# Patient Record
Sex: MALE | Race: WHITE | Employment: OTHER | URBAN - METROPOLITAN AREA
[De-identification: names, ages, dates, MRNs, and addresses within clinical notes are randomized per-mention and may not be internally consistent; named-entity substitution may affect disease eponyms.]

---

## 2017-08-07 ENCOUNTER — TELEPHONE (OUTPATIENT)
Dept: SURGERY | Age: 67
End: 2017-08-07

## 2017-08-07 NOTE — TELEPHONE ENCOUNTER
Patient called to cancel his appointment, stating it was too close to his hip surgery. He might call back next year to reschedule.

## 2017-09-12 ENCOUNTER — HOSPITAL ENCOUNTER (OUTPATIENT)
Dept: PREADMISSION TESTING | Age: 67
Discharge: HOME | End: 2017-09-12
Payer: COMMERCIAL

## 2017-09-12 ENCOUNTER — HOSPITAL ENCOUNTER (OUTPATIENT)
Dept: PHYSICAL THERAPY | Age: 67
Discharge: HOME | End: 2017-09-12

## 2017-09-12 VITALS
RESPIRATION RATE: 18 BRPM | OXYGEN SATURATION: 97 % | DIASTOLIC BLOOD PRESSURE: 75 MMHG | BODY MASS INDEX: 31.17 KG/M2 | HEART RATE: 72 BPM | WEIGHT: 198.6 LBS | SYSTOLIC BLOOD PRESSURE: 146 MMHG | TEMPERATURE: 98.7 F | HEIGHT: 67 IN

## 2017-09-12 LAB
ABO + RH BLD: NORMAL
ALBUMIN SERPL-MCNC: 3.5 G/DL (ref 3.5–5)
ALBUMIN/GLOB SERPL: 1.1 {RATIO} (ref 1.1–2.2)
ALP SERPL-CCNC: 76 U/L (ref 45–117)
ALT SERPL-CCNC: 22 U/L (ref 12–78)
ANION GAP SERPL CALC-SCNC: 6 MMOL/L (ref 5–15)
APPEARANCE UR: CLEAR
AST SERPL-CCNC: 13 U/L (ref 15–37)
ATRIAL RATE: 62 BPM
BACTERIA URNS QL MICRO: NEGATIVE /HPF
BILIRUB SERPL-MCNC: 0.4 MG/DL (ref 0.2–1)
BILIRUB UR QL: NEGATIVE
BLOOD GROUP ANTIBODIES SERPL: NORMAL
BUN SERPL-MCNC: 12 MG/DL (ref 6–20)
BUN/CREAT SERPL: 18 (ref 12–20)
CALCIUM SERPL-MCNC: 8.5 MG/DL (ref 8.5–10.1)
CALCULATED P AXIS, ECG09: 35 DEGREES
CALCULATED R AXIS, ECG10: 46 DEGREES
CALCULATED T AXIS, ECG11: 52 DEGREES
CHLORIDE SERPL-SCNC: 89 MMOL/L (ref 97–108)
CO2 SERPL-SCNC: 32 MMOL/L (ref 21–32)
COLOR UR: NORMAL
CREAT SERPL-MCNC: 0.66 MG/DL (ref 0.7–1.3)
DIAGNOSIS, 93000: NORMAL
EPITH CASTS URNS QL MICRO: NORMAL /LPF
ERYTHROCYTE [DISTWIDTH] IN BLOOD BY AUTOMATED COUNT: 13.4 % (ref 11.5–14.5)
EST. AVERAGE GLUCOSE BLD GHB EST-MCNC: 126 MG/DL
GLOBULIN SER CALC-MCNC: 3.3 G/DL (ref 2–4)
GLUCOSE SERPL-MCNC: 89 MG/DL (ref 65–100)
GLUCOSE UR STRIP.AUTO-MCNC: NEGATIVE MG/DL
HBA1C MFR BLD: 6 % (ref 4.2–6.3)
HCT VFR BLD AUTO: 35 % (ref 36.6–50.3)
HGB BLD-MCNC: 12 G/DL (ref 12.1–17)
HGB UR QL STRIP: NEGATIVE
HYALINE CASTS URNS QL MICRO: NORMAL /LPF (ref 0–5)
INR PPP: 1 (ref 0.9–1.1)
KETONES UR QL STRIP.AUTO: NEGATIVE MG/DL
LEUKOCYTE ESTERASE UR QL STRIP.AUTO: NEGATIVE
MCH RBC QN AUTO: 32.3 PG (ref 26–34)
MCHC RBC AUTO-ENTMCNC: 34.3 G/DL (ref 30–36.5)
MCV RBC AUTO: 94.3 FL (ref 80–99)
NITRITE UR QL STRIP.AUTO: NEGATIVE
P-R INTERVAL, ECG05: 184 MS
PH UR STRIP: 7.5 [PH] (ref 5–8)
PLATELET # BLD AUTO: 278 K/UL (ref 150–400)
POTASSIUM SERPL-SCNC: 3.9 MMOL/L (ref 3.5–5.1)
PROT SERPL-MCNC: 6.8 G/DL (ref 6.4–8.2)
PROT UR STRIP-MCNC: NEGATIVE MG/DL
PROTHROMBIN TIME: 10.2 SEC (ref 9–11.1)
Q-T INTERVAL, ECG07: 408 MS
QRS DURATION, ECG06: 104 MS
QTC CALCULATION (BEZET), ECG08: 414 MS
RBC # BLD AUTO: 3.71 M/UL (ref 4.1–5.7)
RBC #/AREA URNS HPF: NORMAL /HPF (ref 0–5)
SODIUM SERPL-SCNC: 127 MMOL/L (ref 136–145)
SP GR UR REFRACTOMETRY: 1.01 (ref 1–1.03)
SPECIMEN EXP DATE BLD: NORMAL
UA: UC IF INDICATED,UAUC: NORMAL
UROBILINOGEN UR QL STRIP.AUTO: 0.2 EU/DL (ref 0.2–1)
VENTRICULAR RATE, ECG03: 62 BPM
WBC # BLD AUTO: 6.7 K/UL (ref 4.1–11.1)
WBC URNS QL MICRO: NORMAL /HPF (ref 0–4)

## 2017-09-12 PROCEDURE — 80053 COMPREHEN METABOLIC PANEL: CPT | Performed by: ORTHOPAEDIC SURGERY

## 2017-09-12 PROCEDURE — G8980 MOBILITY D/C STATUS: HCPCS

## 2017-09-12 PROCEDURE — G8979 MOBILITY GOAL STATUS: HCPCS

## 2017-09-12 PROCEDURE — 85027 COMPLETE CBC AUTOMATED: CPT | Performed by: ORTHOPAEDIC SURGERY

## 2017-09-12 PROCEDURE — 83036 HEMOGLOBIN GLYCOSYLATED A1C: CPT | Performed by: ORTHOPAEDIC SURGERY

## 2017-09-12 PROCEDURE — 81001 URINALYSIS AUTO W/SCOPE: CPT | Performed by: ORTHOPAEDIC SURGERY

## 2017-09-12 PROCEDURE — G8978 MOBILITY CURRENT STATUS: HCPCS

## 2017-09-12 PROCEDURE — 36415 COLL VENOUS BLD VENIPUNCTURE: CPT | Performed by: ORTHOPAEDIC SURGERY

## 2017-09-12 PROCEDURE — 97161 PT EVAL LOW COMPLEX 20 MIN: CPT

## 2017-09-12 PROCEDURE — 93005 ELECTROCARDIOGRAM TRACING: CPT

## 2017-09-12 PROCEDURE — 85610 PROTHROMBIN TIME: CPT | Performed by: ORTHOPAEDIC SURGERY

## 2017-09-12 PROCEDURE — 86900 BLOOD TYPING SEROLOGIC ABO: CPT | Performed by: ORTHOPAEDIC SURGERY

## 2017-09-12 PROCEDURE — 97110 THERAPEUTIC EXERCISES: CPT

## 2017-09-12 RX ORDER — TESTOSTERONE CYPIONATE 200 MG/ML
200 INJECTION INTRAMUSCULAR
Status: ON HOLD | COMMUNITY
End: 2017-09-21

## 2017-09-12 RX ORDER — CEFAZOLIN SODIUM IN 0.9 % NACL 2 G/100 ML
2 PLASTIC BAG, INJECTION (ML) INTRAVENOUS ONCE
Status: CANCELLED | OUTPATIENT
Start: 2017-09-21 | End: 2017-09-21

## 2017-09-12 RX ORDER — DICLOFENAC SODIUM 75 MG/1
75 TABLET, DELAYED RELEASE ORAL 2 TIMES DAILY
Status: ON HOLD | COMMUNITY
End: 2017-09-21

## 2017-09-12 RX ORDER — ACETAMINOPHEN 500 MG
1000 TABLET ORAL ONCE
Status: CANCELLED | OUTPATIENT
Start: 2017-09-21 | End: 2017-09-21

## 2017-09-12 RX ORDER — CELECOXIB 200 MG/1
400 CAPSULE ORAL ONCE
Status: CANCELLED | OUTPATIENT
Start: 2017-09-21 | End: 2017-09-21

## 2017-09-12 RX ORDER — OXYCODONE AND ACETAMINOPHEN 10; 325 MG/1; MG/1
1 TABLET ORAL
COMMUNITY
End: 2017-09-21

## 2017-09-12 RX ORDER — SODIUM CHLORIDE, SODIUM LACTATE, POTASSIUM CHLORIDE, CALCIUM CHLORIDE 600; 310; 30; 20 MG/100ML; MG/100ML; MG/100ML; MG/100ML
25 INJECTION, SOLUTION INTRAVENOUS CONTINUOUS
Status: CANCELLED | OUTPATIENT
Start: 2017-09-21

## 2017-09-12 RX ORDER — PREGABALIN 150 MG/1
150 CAPSULE ORAL ONCE
Status: CANCELLED | OUTPATIENT
Start: 2017-09-21 | End: 2017-09-21

## 2017-09-12 NOTE — PERIOP NOTES
[facility]  Preoperative Instructions        Surgery Date 09/21/17         Time of Arrival time of arrival to be called    1. On the day of your surgery, please report to the Surgical Services Registration Desk and sign in at your designated time. The Surgery Center is located to the right of the Emergency Room. 2. You must have someone with you to drive you home. You should not drive a car for 24 hours following surgery. Please make arrangements for a friend or family member to stay with you for the first 24 hours after your surgery. 3. Do not have anything to eat or drink (including water, gum, mints, coffee, juice) after midnight 09/20/17?? Ashvin Riddle ? This may not apply to medications prescribed by your physician. ?(Please note below the special instructions with medications to take the morning of your procedure.)    4. We recommend you do not drink any alcoholic beverages for 24 hours before and after your surgery. 5. Stop all Aspirin, non-steroidal anti-inflammatory drugs (i.e. Advil, Aleve), vitamins, and supplements?as directed by your surgeon's office. ? **If you are currently taking Plavix, Coumadin, or other blood-thinning agents, contact your surgeon for instructions. **    6. Wear comfortable clothes. Wear glasses instead of contacts. Do not bring any money or jewelry. Please bring picture ID, insurance card, and any prearranged co-payment or hospital payment. Do not wear make-up, particularly mascara the morning of your surgery. Do not wear nail polish, particularly if you are having foot /hand surgery. Wear your hair loose or down, no ponytails, buns, toney pins or clips. All body piercings must be removed. Please shower with antibacterial soap for three consecutive days before and on the morning of surgery, but do not apply any lotions, powders or deodorants after the shower on the day of surgery. Please use a fresh towels after each shower.  Please sleep in clean clothes and change bed linens the night before surgery. Please do not shave for 48 hours prior to surgery. Shaving of the face is acceptable. 7. You should understand that if you do not follow these instructions your surgery may be cancelled. If your physical condition changes (I.e. fever, cold or flu) please contact your surgeon as soon as possible. 8. It is important that you be on time. If a situation occurs where you may be late, please call [phone] (OR Holding Area). 9. If you have any questions and or problems, please call [phone] (Pre-admission Testing). 10. Your surgery time may be subject to change. You will receive a phone call the evening prior if your time changes. 11.  If having outpatient surgery, you must have someone to drive you here, stay with you during the duration of your stay, and to drive you home at time of discharge. 12.   In an effort to improve the efficiency, privacy, and safety for all of our Pre-op patients visitors are not allowed in the Holding area. Once you arrive and are registered your family/visitors will be asked to remain in the waiting room. The Pre-op staff will get you from the Surgical Waiting Area and will explain to you and your family/visitors that the Pre-op phase is beginning. The staff will answer any questions and provide instructions for tracking of the patient, by use of the existing tracking number and color-coded status board in the waiting room. At this time the staff will also ask for your designated spokesperson information in the event that the physician or staff need to provide an update or obtain any pertinent information. The designated spokesperson will be notified if the physician needs to speak to family during the pre-operative phase. If at any time your family/visitors has questions or concerns they may approach the volunteer desk in the waiting area for assistance.          Special Instructions:May have up to 8 ounces of water after midnight but stop 3 hours prior to your time of arrival to hospital.Bring inhalers morning of surgery and use. Stop diclofenac 1 week prior to surgery. MEDICATIONS TO TAKE THE MORNING OF SURGERY WITH A SIP OF WATER:allopurinol,buspar,celexa,prilosec,percocet if needed. I understand a pre-operative phone call will be made to verify my surgery time. In the event that I am not available, I give permission for a message to be left on my answering service and/or with another person? {yes [phone]         ___________________      __________   _________    (Signature of Patient)             (Witness)                (Date and Time)Preventing Infections Before  and After  Your Surgery  IMPORTANT INSTRUCTIONS    Please read and follow these instructions carefully. Every Night for Three (3) nights before your surgery:  1. Shower with an antibacterial soap, such as Dial, or the soap provided at your preassessment appointment. A shower is better than a bath for cleaning your skin. 2. If needed, ask someone to help you reach all areas of your body. Dont forget to clean your belly button with every shower. The night before your surgery:  1. On the night before your surgery, shower with an antibacterial soap, such as Dial, or the soap provided at your preassessment appointment. 2. With one packet of Hibiclens in hand, turn water off.  3. Apply Hibiclens antiseptic skin cleanser with a clean, freshly washed washcloth. ? Gently apply to your body from chin to toes (except the genital area) and especially the area(s) where your incision(s) will be. ? Leave Hibiclens on your skin for at least 20 seconds. CAUTION: If needed, Hibiclens may be used to clean the folds of skin of the legs (such as in the area of the groin) and on your buttocks and hips. However, do not use Hibiclens above the neck or in the genital area (your bottom) or put inside any area of your body.   4. Turn the water back on and rinse. 5. Dry gently with a clean, freshly washed towel. 6. After your shower, do not use any powder, deodorant, perfumes or lotion. 7. Use clean, freshly washed towels and washcloths every time you shower. 8. Wear clean, freshly washed pajamas to bed the night before surgery. 9. Sleep on clean, freshly washed sheets. 10. Do not allow pets to sleep in your bed with you. The Morning of your surgery:  1. Shower again thoroughly with an antibacterial soap, such as Dial or the soap provided at your preassessment appointment. If needed, ask someone for help to reach all areas of your body. Dont forget to clean your belly button! Rinse. 2. Dry gently with a clean, freshly washed towel. 3. After your shower, do not use any powder, deodorant, perfumes or lotion prior to surgery. 4. Put on clean, freshly washed clothing. Tips to help prevent infections after your surgery:  1. Protect your surgical wound from germs:  ? Hand washing is the most important thing you and your caregivers can do to prevent infections. ? Keep your bandage clean and dry! ? Do not touch your surgical wound. 2. Use clean, freshly washed towels and washcloths every time you shower; do not share bath linens with others. 3. Until your surgical wound is healed, wear clothing and sleep on bed linens each day that are clean and freshly washed. 4. Do not allow pets to sleep in your bed with you or touch your surgical wound. 5. Do not smoke  smoking delays wound healing. This may be a good time to stop smoking. 6. If you have diabetes, it is important for you to manage your blood sugar levels properly before your surgery as well as after your surgery. Poorly managed blood sugar levels slow down wound healing and prevent you from healing completely.

## 2017-09-12 NOTE — ADVANCED PRACTICE NURSE
PC to pt's PCP, Dr. Morena Waller regarding sodium of 127, chloride of 89. Left message on voice mail for instructions regarding pt's abnormal labs. Priya at Dr. Paty Vela' office notified of pt's abnormal labs and that [facility] would be made to PCP regarding further instructions. Will notify surgeon and pt regarding instructions when notified by PCP.

## 2017-09-12 NOTE — PROGRESS NOTES
[facility]  Physical Therapy Pre-surgery evaluation  [address] [facility], [address] [facility]    physical Therapy pre THR surgery EVALUATION  Patient: Kimi García (89 y.o. male)  Date: 9/12/2017  Primary Diagnosis: left total hip DJD        Precautions:        ASSESSMENT :  Based on the objective data described below, the patient presents with impaired gait, balance, pain, and overall high level functional mobility due to end stage degenerative joint disease in the left hip. Pt had his R hip replaced Feb 2016 and only had HHPT. Discussed anticipated disposition to home with possible discharge within a 1 to 2 day time frame post-surgery. Patient and  in agreement. Wife will be , currently not here. GOALS: (Goals have been discussed and agreed upon with patient.)  DISCHARGE GOALS: Time Frame: 1 DAY  1. Patient will demonstrate increased strength, range of motion, and pain control via a home exercise program in order to minimize functional deficits in preparation for their upcoming surgery. This will be achieved by using education, demonstration and through the use of an informational handout including a home exercise program.  REHABILITATION POTENTIAL FOR STATED GOALS: Good     RECOMMENDATIONS AND PLANNED INTERVENTIONS: (Benefits and precautions of physical therapy have been discussed with the patient.)  1. Home Exercise Program  TREATMENT PLAN EFFECTIVE DATES: 9/12/2017 TO 9/12/2017  FREQUENCY/DURATION: Patient to continue to perform home exercise program at least twice daily until his surgery. SUBJECTIVE:   Patient stated Mary Kay Caraballo did my other hip but then he retired.     OBJECTIVE DATA SUMMARY:   HISTORY:    Past Medical History:   Diagnosis Date    Chronic obstructive pulmonary disease ([address]     Chronic pain     back    GERD (gastroesophageal reflux disease)     Hypertension     Ill-defined condition     Gout    Psychiatric disorder     Generalized Anxiety Disorder  Psychiatric disorder     ETOH abuse     Past Surgical History:   Procedure Laterality Date    HX COLECTOMY  2000    diverticulitis    HX OTHER SURGICAL Left     mastoidectomy and inner ear surgery- age  18   [de-identified] TONSILLECTOMY      KNEE SCOPE,MED OR LAT MENIS REPAIR Left      Prior Level of Function/Home Situation: pt lives with wife and has all DME from last replacement. He is currently doing stem cell therapy for his COPD. Personal factors and/or comorbidities impacting plan of care:     Patient []   does  [x]   does not state signs/symptoms of shortness of breath/dyspnea on exertion/respiratory distress. Home Situation  Home Environment: Private residence  # Steps to Enter: 3  Rails to Enter: Yes  Hand Rails : Bilateral (wide)  One/Two Story Residence: Two story, live on 1st floor  Living Alone: No  Support Systems: Spouse/Significant Other/Partner  Patient Expects to be Discharged to[de-identified] Private residence  Current DME Used/Available at Home: Cane, straight, Walker, rolling, Adaptive dressing aides, Adaptive bathing aides, Grab bars  Tub or Shower Type: Tub/Shower combination    EXAMINATION/PRESENTATION/DECISION MAKING:     ADLs (Current Functional Status):    Bathing/Showering:   [x] Independent  [] Requires Assistance from Someone  [] Sponge Bath Only   Ambulation:  [x] Independent  [] Walk Indoors Only  [] Walk Outdoors  [] Use Assistive Device  [] Use Wheelchair Only     Dressing:  [x] Independent    Requires Assistance from Someone for:  [] Sock/Shoes  [] Pants  [] Everything   Household Activities:  [x] Routine house and yard work  [] Light Housework Only  [] None       Critical Behavior:   alert and oriented x 4             Strength:    Strength: Generally decreased, functional                    Tone & Sensation:   Tone: Normal              Sensation: Intact               Range Of Motion:  AROM: Generally decreased, functional           PROM: Within functional limits Coordination:  Coordination: Within functional limits    Functional Mobility:  Transfers:  Sit to Stand: Modified independent  Stand to Sit: Modified independent                       Balance:   Sitting: Intact  Standing: Intact  Ambulation/Gait Training:  Distance (ft): 50 Feet (ft)     Ambulation - Level of Assistance: Independent        Gait Abnormalities: Antalgic, Trunk sway increased             Therapeutic Exercises:   The patient was educated in, has demonstrated, and has received written instructions to complete for their home exercise program per total hip replacement protocol. Functional Measure:  Lower Extremity Functional Scale (LEFS):      Score 38/80     Percentage of impairment CH  0% CI  1-19% CJ  20-39% CK  40-59% CL  60-79% CM  80-99% CN  100%   LEFS score:  0-80 80 64-79 47-63 31-46 16-30 1-15 0     Cutt-offs: None established  TKA and LELO:   (Latasha et al, 2000)  MDC = 9 points   MCID = 9 points           G codes: In compliance with CMSs Claims Based Outcome Reporting, the following G-code set was chosen for this patient based on their primary functional limitation being treated: The outcome measure chosen to determine the severity of the functional limitation was the LEFS with a score of 38/80 which was correlated with the impairment scale. ? Mobility - Walking and Moving Around:     - CURRENT STATUS: CK - 40%-59% impaired, limited or restricted    - GOAL STATUS: CK - 40%-59% impaired, limited or restricted    - D/C STATUS:  CK - 40%-59% impaired, limited or restricted       Pain:                    Activity Tolerance:   WFL   Patient []   does  [x]   does not demonstrate signs/symptoms of shortness of breath/dyspnea on exertion/respiratory distress. COMMUNICATION/EDUCATION:   The patient was educated on:  [x]         Early post operative mobility is imperative to achieve a patient's desired outcomes and to restore biological function.   [x]         Post operative hip precautions may/may not be applicable. These precautions are based on the patient's physician and the procedure(s) performed. [x]         Anterior hip precautions including:  ·       No hip extension  ·       No external rotation  ·       No crossing of the legs/midline    [x]         Posterior hip precautions including:  ·       No hip flexion >90 degrees  ·       No internal rotation  ·       No crossing of the legs/midline    The patients plan of care was discussed as follows:   [x]         The patient verbalized understanding of his plan in preparation for their upcoming surgery  []         The patient's  was present for this session  []        The patient reports that he/she does not have a  identified at this time  []         The  verbalized understanding of the education regarding the patient's upcoming surgery  [x]         Patient/family agree to work toward stated goals and plan of care. []         Patient understands intent and goals of therapy, but is neutral about his/her participation. []         Patient is unable to participate in goal setting and plan of care.       Thank you for this referral.  Isaak Higuera, PT, DPT   Time Calculation: 17 mins

## 2017-09-12 NOTE — PERIOP NOTES
Faxed cbc,chem,coag's and urine results to 's office,fax confirmed. Any treatment for sodium and chloride levels?

## 2017-09-13 LAB
BACTERIA SPEC CULT: NORMAL
BACTERIA SPEC CULT: NORMAL
SERVICE CMNT-IMP: NORMAL

## 2017-09-13 RX ORDER — VALSARTAN 320 MG/1
320 TABLET ORAL DAILY
COMMUNITY
End: 2017-10-11 | Stop reason: DRUGHIGH

## 2017-09-13 NOTE — ADVANCED PRACTICE NURSE
PC from Nadeem Alexander, nurse for Dr. Bhargav Dumont regarding pt's low sodium. States Diovan HCT was discontinued by Dr. Bhargav Dumont and medication was changed to Valsartan. Pt's sodium to be rechecked in one week per Nadeem Alexander who states the pt was notified by Dr. José Antonio London staff of instructions. Dr. Lorin Alcantara' office notified.

## 2017-09-19 NOTE — PERIOP NOTES
Called 's office. Left message on voicemail for 's nurse who is covering for repeat lab results and clearance note. Rquested call back to pre op. Received call back from 's nurse. She will call patient to come into office for repeat labs.

## 2017-09-20 NOTE — H&P
Phoenix Singh MD - Adult Reconstruction and Total Joint Replacement     Orthopaedic History and Physical        NAME: Tameka Hollins       :  1950       MRN:  [id number]        Subjective:   Patient ID: Jef Cotter is a 61 y.o. male.     Chief Complaint: Follow-up of the Left Hip     He continues to note severe L hip pain that is limiting his ability to stay active. He is scheduled to have a L LELO with us in 3 weeks and presents today to be evaluated prior to surgery. His COPD is continuing to improve. No complaints of the contralateral side. His pulmonologist has cleared him for surgery    Patient Active Problem List    Diagnosis Date Noted    Degenerative joint disease of right hip 2016     Past Medical History:   Diagnosis Date    Arthritis     Chronic obstructive pulmonary disease ([address]     Chronic pain     back    GERD (gastroesophageal reflux disease)     Hypertension     Ill-defined condition     Gout    Psychiatric disorder     Generalized Anxiety Disorder    Psychiatric disorder     ETOH abuse      Past Surgical History:   Procedure Laterality Date    HX COLECTOMY  2000    diverticulitis    HX OTHER SURGICAL Left     mastoidectomy and inner ear surgery- age  18   [de-identified] OTHER SURGICAL      [facility]d    HX TONSILLECTOMY      KNEE SCOPE,MED OR LAT MENIS REPAIR Left       Prior to Admission medications    Medication Sig Start Date End Date Taking? Authorizing Provider   valsartan (DIOVAN) 320 mg tablet Take 320 mg by mouth daily. Historical Provider   oxyCODONE-acetaminophen (PERCOCET)  mg per tablet Take 1 Tab by mouth every eight (8) hours as needed for Pain. Historical Provider   diclofenac EC (VOLTAREN) 75 mg EC tablet Take 75 mg by mouth two (2) times a day. Historical Provider   testosterone cypionate (DEPOTESTOTERONE CYPIONATE) 200 mg/mL injection 200 mg by IntraMUSCular route every fourteen (14) days. Historical Provider   allopurinol (ZYLOPRIM) 300 mg tablet Take 300 mg by mouth daily. Historical Provider   omeprazole (PRILOSEC) 20 mg capsule Take 20 mg by mouth daily. Historical Provider   busPIRone (BUSPAR) 15 mg tablet Take 15 mg by mouth three (3) times daily. Historical Provider   citalopram (CELEXA) 40 mg tablet Take 40 mg by mouth daily. Historical Provider   budesonide-formoterol (SYMBICORT) 160-4.5 mcg/actuation HFA inhaler Take 2 Puffs by inhalation two (2) times a day. Historical Provider   tiotropium (SPIRIVA) 18 mcg inhalation capsule Take 1 Cap by inhalation daily. Historical Provider   albuterol (PROVENTIL HFA, VENTOLIN HFA, PROAIR HFA) 90 mcg/actuation inhaler Take 2 Puffs by inhalation daily. Historical Provider     Allergies   Allergen Reactions    Adhesive Tape-Silicones Other (comments)     Pulls skin off    Sulfa (Sulfonamide Antibiotics) Shortness of Breath      Social History   Substance Use Topics    Smoking status: Former Smoker     Years: 29.00     Quit date: 2/17/1993    Smokeless tobacco: Never Used    Alcohol use No      Comment: quit march 15 2017      Family History   Problem Relation Age of Onset    Cancer Mother      lung    Arthritis-osteo Mother     Cancer Father      throat    Cancer Brother      prostate        REVIEW OF SYSTEMS: A comprehensive review of systems was negative except for that written in the HPI. Objective:   Constitutional: He appears stated age. Pt is cooperative and is in no acute distress. Well nourished. Well developed. Body habitus is obese. Body mass index is 30.57 kg/(m^2). Gait is antalgic. Assistive devices: none  Eyes:  Sclera are nonicteric. Respiratory:  No labored breathing. Cardiovascular:  No marked edema. Well perfused extremities bilaterally. Skin:  No marked skin ulcers. No lymphedema or skin abnormalities. Neurological:  No marked sensory loss noted. Grossly neurovascularly intact.  Both lower extremities are intact to distal sensory and motor function. Psychiatric: Alert and oriented x3.     MUSCULOSKELETAL: Lumbar spine is nonfocal. No paraspinal tenderness. Painless trunk ROM. Painful L hip ROM. 5/5 LLE strength        Radiographs:        X-ray Pelvis 1 Or 2 Views     Result Date: 8/31/2017  Shielding: N/A. Supine. AP. Notes  Indication(s): L hip pain. Impression: He has severe degenerative changes and joint space narrowing in the L hip. Aspherical femoral head and acetabulum with a large CAM.  Films show the pt's R LELO prosthesis in good clinical alignment with no evidence of loosening or wear.          Assessment:      1. Primary osteoarthritis of left hip    2. Status post total replacement of right hip    3. Non morbid obesity, unspecified obesity type            Plan: At this time, I recommended we continue as plan with the L LELO. Conservative treatments including activity modification, medication, and steroid injections have not successfully relieved pain. Surgery was discussed at length with the pt today. We went over all the pertinent risks, benefits, and alternatives to the procedure. They understand no guarantees can be made about the outcome and they would like to proceed. I discussed the pre-op total joint class and general recovery timeline with the pt. The pt understands the need for medical clearance.  We will plan for the LELO in 3 weeks.             Scribed for Vicente Arzate PA-C by KIRK Hazel

## 2017-09-20 NOTE — ADVANCED PRACTICE NURSE
09/20/17 1000 PC to Dr. Alysa Preston office to obtain repeat labs and clearance for pt's surgery tomorrow. Office to fax results to [phone].

## 2017-09-20 NOTE — PERIOP NOTES
Spoke with Carlos at Dr. Cosme Calderon' office about patient still needing to see Dr. Reji Sniderr for repeat labs. Carlos to call Dr. Jeff Khan office to get clearance note and notify Dr. Cosme Calderon.

## 2017-09-21 ENCOUNTER — HOSPITAL ENCOUNTER (INPATIENT)
Age: 67
LOS: 1 days | Discharge: HOME HEALTH | DRG: 470 | End: 2017-09-21
Attending: ORTHOPAEDIC SURGERY | Admitting: ORTHOPAEDIC SURGERY
Payer: COMMERCIAL

## 2017-09-21 ENCOUNTER — ANESTHESIA EVENT (OUTPATIENT)
Dept: SURGERY | Age: 67
DRG: 470 | End: 2017-09-21
Payer: COMMERCIAL

## 2017-09-21 ENCOUNTER — APPOINTMENT (OUTPATIENT)
Dept: GENERAL RADIOLOGY | Age: 67
DRG: 470 | End: 2017-09-21
Attending: ORTHOPAEDIC SURGERY
Payer: COMMERCIAL

## 2017-09-21 ENCOUNTER — ANESTHESIA (OUTPATIENT)
Dept: SURGERY | Age: 67
DRG: 470 | End: 2017-09-21
Payer: COMMERCIAL

## 2017-09-21 VITALS
TEMPERATURE: 97.6 F | HEART RATE: 73 BPM | SYSTOLIC BLOOD PRESSURE: 122 MMHG | WEIGHT: 197.75 LBS | HEIGHT: 67 IN | RESPIRATION RATE: 16 BRPM | DIASTOLIC BLOOD PRESSURE: 75 MMHG | OXYGEN SATURATION: 96 % | BODY MASS INDEX: 31.04 KG/M2

## 2017-09-21 PROBLEM — M16.10 HIP ARTHRITIS: Status: ACTIVE | Noted: 2017-09-21

## 2017-09-21 LAB
ANION GAP BLD CALC-SCNC: 13 MMOL/L (ref 5–15)
BUN BLD-MCNC: 14 MG/DL (ref 9–20)
CA-I BLD-MCNC: 1.09 MMOL/L (ref 1.12–1.32)
CHLORIDE BLD-SCNC: 96 MMOL/L (ref 98–107)
CO2 BLD-SCNC: 30 MMOL/L (ref 21–32)
CREAT BLD-MCNC: 0.7 MG/DL (ref 0.6–1.3)
GLUCOSE BLD-MCNC: 93 MG/DL (ref 65–100)
HCT VFR BLD CALC: 35 % (ref 36.6–50.3)
HGB BLD-MCNC: 11.9 GM/DL (ref 12.1–17)
POTASSIUM BLD-SCNC: 4.8 MMOL/L (ref 3.5–5.1)
SERVICE CMNT-IMP: ABNORMAL
SODIUM BLD-SCNC: 133 MMOL/L (ref 136–145)

## 2017-09-21 PROCEDURE — 77030008467 HC STPLR SKN COVD -B: Performed by: ORTHOPAEDIC SURGERY

## 2017-09-21 PROCEDURE — 77030018846 HC SOL IRR STRL H20 ICUM -A: Performed by: ORTHOPAEDIC SURGERY

## 2017-09-21 PROCEDURE — 74011250636 HC RX REV CODE- 250/636

## 2017-09-21 PROCEDURE — 77030020782 HC GWN BAIR PAWS FLX 3M -B

## 2017-09-21 PROCEDURE — 0SRB04Z REPLACEMENT OF LEFT HIP JOINT WITH CERAMIC ON POLYETHYLENE SYNTHETIC SUBSTITUTE, OPEN APPROACH: ICD-10-PCS | Performed by: ORTHOPAEDIC SURGERY

## 2017-09-21 PROCEDURE — 74011250636 HC RX REV CODE- 250/636: Performed by: PHYSICIAN ASSISTANT

## 2017-09-21 PROCEDURE — 74011000250 HC RX REV CODE- 250: Performed by: ORTHOPAEDIC SURGERY

## 2017-09-21 PROCEDURE — 77030032490 HC SLV COMPR SCD KNE COVD -B: Performed by: ORTHOPAEDIC SURGERY

## 2017-09-21 PROCEDURE — 76001 XR FLUOROSCOPY OVER 60 MINUTES: CPT

## 2017-09-21 PROCEDURE — 77030019908 HC STETH ESOPH SIMS -A: Performed by: NURSE ANESTHETIST, CERTIFIED REGISTERED

## 2017-09-21 PROCEDURE — 65270000029 HC RM PRIVATE

## 2017-09-21 PROCEDURE — 77030013079 HC BLNKT BAIR HGGR 3M -A: Performed by: NURSE ANESTHETIST, CERTIFIED REGISTERED

## 2017-09-21 PROCEDURE — 74011250636 HC RX REV CODE- 250/636: Performed by: ORTHOPAEDIC SURGERY

## 2017-09-21 PROCEDURE — 77030035236 HC SUT PDS STRATFX BARB J&J -B: Performed by: ORTHOPAEDIC SURGERY

## 2017-09-21 PROCEDURE — 77030020061 HC IV BLD WRMR ADMIN SET 3M -B: Performed by: NURSE ANESTHETIST, CERTIFIED REGISTERED

## 2017-09-21 PROCEDURE — 76210000006 HC OR PH I REC 0.5 TO 1 HR: Performed by: ORTHOPAEDIC SURGERY

## 2017-09-21 PROCEDURE — 74011250636 HC RX REV CODE- 250/636: Performed by: ANESTHESIOLOGY

## 2017-09-21 PROCEDURE — 97116 GAIT TRAINING THERAPY: CPT

## 2017-09-21 PROCEDURE — 74011250637 HC RX REV CODE- 250/637: Performed by: ORTHOPAEDIC SURGERY

## 2017-09-21 PROCEDURE — 77030018836 HC SOL IRR NACL ICUM -A: Performed by: ORTHOPAEDIC SURGERY

## 2017-09-21 PROCEDURE — G8978 MOBILITY CURRENT STATUS: HCPCS

## 2017-09-21 PROCEDURE — 77030036722: Performed by: ORTHOPAEDIC SURGERY

## 2017-09-21 PROCEDURE — C1776 JOINT DEVICE (IMPLANTABLE): HCPCS | Performed by: ORTHOPAEDIC SURGERY

## 2017-09-21 PROCEDURE — 74011250637 HC RX REV CODE- 250/637

## 2017-09-21 PROCEDURE — 76060000035 HC ANESTHESIA 2 TO 2.5 HR: Performed by: ORTHOPAEDIC SURGERY

## 2017-09-21 PROCEDURE — 77030003666 HC NDL SPINAL BD -A: Performed by: ORTHOPAEDIC SURGERY

## 2017-09-21 PROCEDURE — 74011250637 HC RX REV CODE- 250/637: Performed by: PHYSICIAN ASSISTANT

## 2017-09-21 PROCEDURE — 8E0YXBF COMPUTER ASSISTED PROCEDURE OF LOWER EXTREMITY, WITH FLUOROSCOPY: ICD-10-PCS | Performed by: ORTHOPAEDIC SURGERY

## 2017-09-21 PROCEDURE — 77030033138 HC SUT PGA STRATFX J&J -B: Performed by: ORTHOPAEDIC SURGERY

## 2017-09-21 PROCEDURE — 77030022704 HC SUT VLOC COVD -B: Performed by: ORTHOPAEDIC SURGERY

## 2017-09-21 PROCEDURE — 73501 X-RAY EXAM HIP UNI 1 VIEW: CPT

## 2017-09-21 PROCEDURE — 77030011640 HC PAD GRND REM COVD -A: Performed by: ORTHOPAEDIC SURGERY

## 2017-09-21 PROCEDURE — 77030026438 HC STYL ET INTUB CARD -A: Performed by: NURSE ANESTHETIST, CERTIFIED REGISTERED

## 2017-09-21 PROCEDURE — 74011000250 HC RX REV CODE- 250

## 2017-09-21 PROCEDURE — 74011000258 HC RX REV CODE- 258: Performed by: ORTHOPAEDIC SURGERY

## 2017-09-21 PROCEDURE — 77030014647 HC SEAL FBRN TISSL BAXT -D: Performed by: ORTHOPAEDIC SURGERY

## 2017-09-21 PROCEDURE — 77030020788: Performed by: ORTHOPAEDIC SURGERY

## 2017-09-21 PROCEDURE — 77030018723 HC ELCTRD BLD COVD -A: Performed by: ORTHOPAEDIC SURGERY

## 2017-09-21 PROCEDURE — 80047 BASIC METABLC PNL IONIZED CA: CPT

## 2017-09-21 PROCEDURE — G8979 MOBILITY GOAL STATUS: HCPCS

## 2017-09-21 PROCEDURE — 97161 PT EVAL LOW COMPLEX 20 MIN: CPT

## 2017-09-21 PROCEDURE — 77030008684 HC TU ET CUF COVD -B: Performed by: NURSE ANESTHETIST, CERTIFIED REGISTERED

## 2017-09-21 PROCEDURE — 76010000171 HC OR TIME 2 TO 2.5 HR INTENSV-TIER 1: Performed by: ORTHOPAEDIC SURGERY

## 2017-09-21 PROCEDURE — 77030034479 HC ADH SKN CLSR PRINEO J&J -B: Performed by: ORTHOPAEDIC SURGERY

## 2017-09-21 PROCEDURE — 77030006789 HC BLD SAW OSC STRY -C: Performed by: ORTHOPAEDIC SURGERY

## 2017-09-21 DEVICE — IMPLANTABLE DEVICE
Type: IMPLANTABLE DEVICE | Site: HIP | Status: FUNCTIONAL
Brand: NOVATION

## 2017-09-21 DEVICE — IMPLANTABLE DEVICE
Type: IMPLANTABLE DEVICE | Site: HIP | Status: FUNCTIONAL
Brand: ALTEON

## 2017-09-21 DEVICE — COMPONENT HIP PRSS FT CERM ON POLYETH [EXACBSVH1COP] [EXACTECH INC]: Type: IMPLANTABLE DEVICE | Site: HIP | Status: FUNCTIONAL

## 2017-09-21 DEVICE — IMPLANTABLE DEVICE
Type: IMPLANTABLE DEVICE | Site: HIP | Status: FUNCTIONAL
Brand: EXACTECH

## 2017-09-21 RX ORDER — ADHESIVE BANDAGE
30 BANDAGE TOPICAL DAILY PRN
Status: DISCONTINUED | OUTPATIENT
Start: 2017-09-22 | End: 2017-09-21 | Stop reason: HOSPADM

## 2017-09-21 RX ORDER — MIDAZOLAM HYDROCHLORIDE 1 MG/ML
0.5 INJECTION, SOLUTION INTRAMUSCULAR; INTRAVENOUS
Status: DISCONTINUED | OUTPATIENT
Start: 2017-09-21 | End: 2017-09-21 | Stop reason: HOSPADM

## 2017-09-21 RX ORDER — PROPOFOL 10 MG/ML
INJECTION, EMULSION INTRAVENOUS AS NEEDED
Status: DISCONTINUED | OUTPATIENT
Start: 2017-09-21 | End: 2017-09-21 | Stop reason: HOSPADM

## 2017-09-21 RX ORDER — FLUTICASONE FUROATE AND VILANTEROL 100; 25 UG/1; UG/1
1 POWDER RESPIRATORY (INHALATION) DAILY
Status: DISCONTINUED | OUTPATIENT
Start: 2017-09-22 | End: 2017-09-21 | Stop reason: HOSPADM

## 2017-09-21 RX ORDER — CEFAZOLIN SODIUM IN 0.9 % NACL 2 G/100 ML
2 PLASTIC BAG, INJECTION (ML) INTRAVENOUS EVERY 8 HOURS
Status: DISCONTINUED | OUTPATIENT
Start: 2017-09-21 | End: 2017-09-21 | Stop reason: HOSPADM

## 2017-09-21 RX ORDER — POLYETHYLENE GLYCOL 3350 17 G/17G
17 POWDER, FOR SOLUTION ORAL DAILY
Status: DISCONTINUED | OUTPATIENT
Start: 2017-09-21 | End: 2017-09-21 | Stop reason: HOSPADM

## 2017-09-21 RX ORDER — FENTANYL CITRATE 50 UG/ML
INJECTION, SOLUTION INTRAMUSCULAR; INTRAVENOUS AS NEEDED
Status: DISCONTINUED | OUTPATIENT
Start: 2017-09-21 | End: 2017-09-21 | Stop reason: HOSPADM

## 2017-09-21 RX ORDER — ROCURONIUM BROMIDE 10 MG/ML
INJECTION, SOLUTION INTRAVENOUS AS NEEDED
Status: DISCONTINUED | OUTPATIENT
Start: 2017-09-21 | End: 2017-09-21 | Stop reason: HOSPADM

## 2017-09-21 RX ORDER — BUSPIRONE HYDROCHLORIDE 5 MG/1
15 TABLET ORAL 3 TIMES DAILY
Status: DISCONTINUED | OUTPATIENT
Start: 2017-09-21 | End: 2017-09-21 | Stop reason: HOSPADM

## 2017-09-21 RX ORDER — PANTOPRAZOLE SODIUM 40 MG/1
40 TABLET, DELAYED RELEASE ORAL
Status: DISCONTINUED | OUTPATIENT
Start: 2017-09-22 | End: 2017-09-21 | Stop reason: HOSPADM

## 2017-09-21 RX ORDER — GLYCOPYRROLATE 0.2 MG/ML
INJECTION INTRAMUSCULAR; INTRAVENOUS AS NEEDED
Status: DISCONTINUED | OUTPATIENT
Start: 2017-09-21 | End: 2017-09-21 | Stop reason: HOSPADM

## 2017-09-21 RX ORDER — DIPHENHYDRAMINE HCL 12.5MG/5ML
12.5 ELIXIR ORAL
Status: DISCONTINUED | OUTPATIENT
Start: 2017-09-21 | End: 2017-09-21 | Stop reason: HOSPADM

## 2017-09-21 RX ORDER — ASPIRIN 325 MG
325 TABLET, DELAYED RELEASE (ENTERIC COATED) ORAL 2 TIMES DAILY
Qty: 60 TAB | Refills: 0 | Status: SHIPPED | OUTPATIENT
Start: 2017-09-21 | End: 2017-10-21

## 2017-09-21 RX ORDER — ONDANSETRON 2 MG/ML
4 INJECTION INTRAMUSCULAR; INTRAVENOUS AS NEEDED
Status: DISCONTINUED | OUTPATIENT
Start: 2017-09-21 | End: 2017-09-21 | Stop reason: HOSPADM

## 2017-09-21 RX ORDER — TRANEXAMIC ACID 100 MG/ML
INJECTION, SOLUTION INTRAVENOUS
Status: DISCONTINUED
Start: 2017-09-21 | End: 2017-09-21 | Stop reason: HOSPADM

## 2017-09-21 RX ORDER — OXYCODONE HYDROCHLORIDE 10 MG/1
5-10 TABLET ORAL
Qty: 80 TAB | Refills: 0 | Status: SHIPPED | OUTPATIENT
Start: 2017-09-21 | End: 2017-10-11

## 2017-09-21 RX ORDER — MIDAZOLAM HYDROCHLORIDE 1 MG/ML
1 INJECTION, SOLUTION INTRAMUSCULAR; INTRAVENOUS AS NEEDED
Status: DISCONTINUED | OUTPATIENT
Start: 2017-09-21 | End: 2017-09-21 | Stop reason: HOSPADM

## 2017-09-21 RX ORDER — SODIUM CHLORIDE 0.9 % (FLUSH) 0.9 %
5-10 SYRINGE (ML) INJECTION AS NEEDED
Status: DISCONTINUED | OUTPATIENT
Start: 2017-09-21 | End: 2017-09-21 | Stop reason: HOSPADM

## 2017-09-21 RX ORDER — OXYCODONE HYDROCHLORIDE 5 MG/1
10 TABLET ORAL
Status: DISCONTINUED | OUTPATIENT
Start: 2017-09-21 | End: 2017-09-21 | Stop reason: HOSPADM

## 2017-09-21 RX ORDER — HYDROCODONE BITARTRATE AND ACETAMINOPHEN 5; 325 MG/1; MG/1
1 TABLET ORAL AS NEEDED
Status: DISCONTINUED | OUTPATIENT
Start: 2017-09-21 | End: 2017-09-21 | Stop reason: HOSPADM

## 2017-09-21 RX ORDER — HYDROMORPHONE HYDROCHLORIDE 1 MG/ML
0.5 INJECTION, SOLUTION INTRAMUSCULAR; INTRAVENOUS; SUBCUTANEOUS
Status: DISCONTINUED | OUTPATIENT
Start: 2017-09-21 | End: 2017-09-21 | Stop reason: HOSPADM

## 2017-09-21 RX ORDER — METOPROLOL TARTRATE 5 MG/5ML
INJECTION INTRAVENOUS AS NEEDED
Status: DISCONTINUED | OUTPATIENT
Start: 2017-09-21 | End: 2017-09-21 | Stop reason: HOSPADM

## 2017-09-21 RX ORDER — LORATADINE 10 MG/1
10 TABLET ORAL
Status: DISCONTINUED | OUTPATIENT
Start: 2017-09-21 | End: 2017-09-21 | Stop reason: HOSPADM

## 2017-09-21 RX ORDER — DIPHENHYDRAMINE HYDROCHLORIDE 50 MG/ML
12.5 INJECTION, SOLUTION INTRAMUSCULAR; INTRAVENOUS AS NEEDED
Status: DISCONTINUED | OUTPATIENT
Start: 2017-09-21 | End: 2017-09-21 | Stop reason: HOSPADM

## 2017-09-21 RX ORDER — CEFAZOLIN SODIUM IN 0.9 % NACL 2 G/100 ML
2 PLASTIC BAG, INJECTION (ML) INTRAVENOUS ONCE
Status: COMPLETED | OUTPATIENT
Start: 2017-09-21 | End: 2017-09-21

## 2017-09-21 RX ORDER — ONDANSETRON 2 MG/ML
4 INJECTION INTRAMUSCULAR; INTRAVENOUS
Status: DISCONTINUED | OUTPATIENT
Start: 2017-09-21 | End: 2017-09-21 | Stop reason: HOSPADM

## 2017-09-21 RX ORDER — CITALOPRAM 20 MG/1
40 TABLET, FILM COATED ORAL DAILY
Status: DISCONTINUED | OUTPATIENT
Start: 2017-09-21 | End: 2017-09-21 | Stop reason: HOSPADM

## 2017-09-21 RX ORDER — DEXAMETHASONE SODIUM PHOSPHATE 4 MG/ML
10 INJECTION, SOLUTION INTRA-ARTICULAR; INTRALESIONAL; INTRAMUSCULAR; INTRAVENOUS; SOFT TISSUE ONCE
Status: DISCONTINUED | OUTPATIENT
Start: 2017-09-22 | End: 2017-09-21 | Stop reason: HOSPADM

## 2017-09-21 RX ORDER — MIDAZOLAM HYDROCHLORIDE 1 MG/ML
INJECTION, SOLUTION INTRAMUSCULAR; INTRAVENOUS AS NEEDED
Status: DISCONTINUED | OUTPATIENT
Start: 2017-09-21 | End: 2017-09-21 | Stop reason: HOSPADM

## 2017-09-21 RX ORDER — ACETAMINOPHEN 500 MG
1000 TABLET ORAL ONCE
Status: COMPLETED | OUTPATIENT
Start: 2017-09-21 | End: 2017-09-21

## 2017-09-21 RX ORDER — SODIUM CHLORIDE 9 MG/ML
INJECTION, SOLUTION INTRAVENOUS
Status: COMPLETED
Start: 2017-09-21 | End: 2017-09-21

## 2017-09-21 RX ORDER — SODIUM CHLORIDE 9 MG/ML
125 INJECTION, SOLUTION INTRAVENOUS CONTINUOUS
Status: DISCONTINUED | OUTPATIENT
Start: 2017-09-21 | End: 2017-09-21 | Stop reason: HOSPADM

## 2017-09-21 RX ORDER — OXYCODONE HYDROCHLORIDE 5 MG/1
15 TABLET ORAL
Status: DISCONTINUED | OUTPATIENT
Start: 2017-09-21 | End: 2017-09-21 | Stop reason: HOSPADM

## 2017-09-21 RX ORDER — DEXAMETHASONE SODIUM PHOSPHATE 4 MG/ML
INJECTION, SOLUTION INTRA-ARTICULAR; INTRALESIONAL; INTRAMUSCULAR; INTRAVENOUS; SOFT TISSUE AS NEEDED
Status: DISCONTINUED | OUTPATIENT
Start: 2017-09-21 | End: 2017-09-21 | Stop reason: HOSPADM

## 2017-09-21 RX ORDER — ALLOPURINOL 300 MG/1
300 TABLET ORAL DAILY
Status: DISCONTINUED | OUTPATIENT
Start: 2017-09-21 | End: 2017-09-21 | Stop reason: HOSPADM

## 2017-09-21 RX ORDER — LORATADINE 10 MG/1
10 TABLET ORAL
COMMUNITY

## 2017-09-21 RX ORDER — LIDOCAINE HYDROCHLORIDE 10 MG/ML
0.1 INJECTION, SOLUTION EPIDURAL; INFILTRATION; INTRACAUDAL; PERINEURAL AS NEEDED
Status: DISCONTINUED | OUTPATIENT
Start: 2017-09-21 | End: 2017-09-21 | Stop reason: HOSPADM

## 2017-09-21 RX ORDER — AMOXICILLIN 250 MG
1 CAPSULE ORAL 2 TIMES DAILY
Status: DISCONTINUED | OUTPATIENT
Start: 2017-09-21 | End: 2017-09-21 | Stop reason: HOSPADM

## 2017-09-21 RX ORDER — OXYCODONE HYDROCHLORIDE 5 MG/1
TABLET ORAL
Status: COMPLETED
Start: 2017-09-21 | End: 2017-09-21

## 2017-09-21 RX ORDER — HYDROMORPHONE HYDROCHLORIDE 2 MG/ML
INJECTION, SOLUTION INTRAMUSCULAR; INTRAVENOUS; SUBCUTANEOUS AS NEEDED
Status: DISCONTINUED | OUTPATIENT
Start: 2017-09-21 | End: 2017-09-21 | Stop reason: HOSPADM

## 2017-09-21 RX ORDER — ACETAMINOPHEN 325 MG/1
650 TABLET ORAL EVERY 6 HOURS
Status: DISCONTINUED | OUTPATIENT
Start: 2017-09-21 | End: 2017-09-21 | Stop reason: HOSPADM

## 2017-09-21 RX ORDER — ASPIRIN 325 MG
325 TABLET, DELAYED RELEASE (ENTERIC COATED) ORAL 2 TIMES DAILY
Status: DISCONTINUED | OUTPATIENT
Start: 2017-09-21 | End: 2017-09-21 | Stop reason: HOSPADM

## 2017-09-21 RX ORDER — FENTANYL CITRATE 50 UG/ML
50 INJECTION, SOLUTION INTRAMUSCULAR; INTRAVENOUS AS NEEDED
Status: DISCONTINUED | OUTPATIENT
Start: 2017-09-21 | End: 2017-09-21 | Stop reason: HOSPADM

## 2017-09-21 RX ORDER — FACIAL-BODY WIPES
10 EACH TOPICAL DAILY PRN
Status: DISCONTINUED | OUTPATIENT
Start: 2017-09-23 | End: 2017-09-21 | Stop reason: HOSPADM

## 2017-09-21 RX ORDER — ONDANSETRON 2 MG/ML
INJECTION INTRAMUSCULAR; INTRAVENOUS AS NEEDED
Status: DISCONTINUED | OUTPATIENT
Start: 2017-09-21 | End: 2017-09-21 | Stop reason: HOSPADM

## 2017-09-21 RX ORDER — SODIUM CHLORIDE 0.9 % (FLUSH) 0.9 %
5-10 SYRINGE (ML) INJECTION EVERY 8 HOURS
Status: DISCONTINUED | OUTPATIENT
Start: 2017-09-22 | End: 2017-09-21 | Stop reason: HOSPADM

## 2017-09-21 RX ORDER — SODIUM CHLORIDE, SODIUM LACTATE, POTASSIUM CHLORIDE, CALCIUM CHLORIDE 600; 310; 30; 20 MG/100ML; MG/100ML; MG/100ML; MG/100ML
25 INJECTION, SOLUTION INTRAVENOUS CONTINUOUS
Status: DISCONTINUED | OUTPATIENT
Start: 2017-09-21 | End: 2017-09-21 | Stop reason: HOSPADM

## 2017-09-21 RX ORDER — PREGABALIN 75 MG/1
150 CAPSULE ORAL ONCE
Status: COMPLETED | OUTPATIENT
Start: 2017-09-21 | End: 2017-09-21

## 2017-09-21 RX ORDER — LIDOCAINE HYDROCHLORIDE 20 MG/ML
INJECTION, SOLUTION EPIDURAL; INFILTRATION; INTRACAUDAL; PERINEURAL AS NEEDED
Status: DISCONTINUED | OUTPATIENT
Start: 2017-09-21 | End: 2017-09-21 | Stop reason: HOSPADM

## 2017-09-21 RX ORDER — KETOROLAC TROMETHAMINE 30 MG/ML
30 INJECTION, SOLUTION INTRAMUSCULAR; INTRAVENOUS EVERY 6 HOURS
Status: DISCONTINUED | OUTPATIENT
Start: 2017-09-21 | End: 2017-09-21 | Stop reason: HOSPADM

## 2017-09-21 RX ORDER — NEOSTIGMINE METHYLSULFATE 1 MG/ML
INJECTION INTRAVENOUS AS NEEDED
Status: DISCONTINUED | OUTPATIENT
Start: 2017-09-21 | End: 2017-09-21 | Stop reason: HOSPADM

## 2017-09-21 RX ORDER — ACETAMINOPHEN 325 MG/1
650 TABLET ORAL EVERY 6 HOURS
Qty: 112 TAB | Refills: 0 | Status: SHIPPED | OUTPATIENT
Start: 2017-09-21 | End: 2017-10-05

## 2017-09-21 RX ORDER — ALBUTEROL SULFATE 90 UG/1
2 AEROSOL, METERED RESPIRATORY (INHALATION) DAILY
Status: DISCONTINUED | OUTPATIENT
Start: 2017-09-21 | End: 2017-09-21

## 2017-09-21 RX ORDER — POLYETHYLENE GLYCOL 3350 17 G/17G
17 POWDER, FOR SOLUTION ORAL DAILY
Qty: 255 G | Refills: 0 | Status: SHIPPED | OUTPATIENT
Start: 2017-09-21 | End: 2017-10-06

## 2017-09-21 RX ORDER — ALBUTEROL SULFATE 90 UG/1
2 AEROSOL, METERED RESPIRATORY (INHALATION)
Status: DISCONTINUED | OUTPATIENT
Start: 2017-09-21 | End: 2017-09-21 | Stop reason: HOSPADM

## 2017-09-21 RX ORDER — FENTANYL CITRATE 50 UG/ML
25 INJECTION, SOLUTION INTRAMUSCULAR; INTRAVENOUS
Status: DISCONTINUED | OUTPATIENT
Start: 2017-09-21 | End: 2017-09-21 | Stop reason: HOSPADM

## 2017-09-21 RX ORDER — SODIUM CHLORIDE, SODIUM LACTATE, POTASSIUM CHLORIDE, CALCIUM CHLORIDE 600; 310; 30; 20 MG/100ML; MG/100ML; MG/100ML; MG/100ML
100 INJECTION, SOLUTION INTRAVENOUS CONTINUOUS
Status: DISCONTINUED | OUTPATIENT
Start: 2017-09-21 | End: 2017-09-21 | Stop reason: HOSPADM

## 2017-09-21 RX ORDER — TESTOSTERONE CYPIONATE 200 MG/ML
200 INJECTION INTRAMUSCULAR
Qty: 0 VIAL | Refills: 0 | Status: SHIPPED | COMMUNITY
Start: 2017-09-21 | End: 2019-01-16

## 2017-09-21 RX ORDER — NALOXONE HYDROCHLORIDE 0.4 MG/ML
0.4 INJECTION, SOLUTION INTRAMUSCULAR; INTRAVENOUS; SUBCUTANEOUS AS NEEDED
Status: DISCONTINUED | OUTPATIENT
Start: 2017-09-21 | End: 2017-09-21 | Stop reason: HOSPADM

## 2017-09-21 RX ORDER — AMOXICILLIN 250 MG
1 CAPSULE ORAL 2 TIMES DAILY
Qty: 60 TAB | Refills: 0 | Status: SHIPPED | OUTPATIENT
Start: 2017-09-21 | End: 2017-10-11

## 2017-09-21 RX ORDER — CELECOXIB 200 MG/1
400 CAPSULE ORAL ONCE
Status: COMPLETED | OUTPATIENT
Start: 2017-09-21 | End: 2017-09-21

## 2017-09-21 RX ORDER — DICLOFENAC SODIUM 75 MG/1
75 TABLET, DELAYED RELEASE ORAL 2 TIMES DAILY
Status: SHIPPED | COMMUNITY
Start: 2017-09-21 | End: 2022-08-27

## 2017-09-21 RX ADMIN — PREGABALIN 150 MG: 75 CAPSULE ORAL at 07:02

## 2017-09-21 RX ADMIN — Medication 10 ML: at 12:19

## 2017-09-21 RX ADMIN — HYDROMORPHONE HYDROCHLORIDE 0.5 MG: 2 INJECTION, SOLUTION INTRAMUSCULAR; INTRAVENOUS; SUBCUTANEOUS at 08:02

## 2017-09-21 RX ADMIN — ACETAMINOPHEN 650 MG: 325 TABLET ORAL at 13:20

## 2017-09-21 RX ADMIN — DEXAMETHASONE SODIUM PHOSPHATE 10 MG: 4 INJECTION, SOLUTION INTRA-ARTICULAR; INTRALESIONAL; INTRAMUSCULAR; INTRAVENOUS; SOFT TISSUE at 07:50

## 2017-09-21 RX ADMIN — LIDOCAINE HYDROCHLORIDE 60 MG: 20 INJECTION, SOLUTION EPIDURAL; INFILTRATION; INTRACAUDAL; PERINEURAL at 07:42

## 2017-09-21 RX ADMIN — GLYCOPYRROLATE 0.4 MG: 0.2 INJECTION INTRAMUSCULAR; INTRAVENOUS at 09:11

## 2017-09-21 RX ADMIN — SODIUM CHLORIDE, SODIUM LACTATE, POTASSIUM CHLORIDE, AND CALCIUM CHLORIDE: 600; 310; 30; 20 INJECTION, SOLUTION INTRAVENOUS at 08:35

## 2017-09-21 RX ADMIN — BUSPIRONE HYDROCHLORIDE 15 MG: 5 TABLET ORAL at 16:18

## 2017-09-21 RX ADMIN — HYDROMORPHONE HYDROCHLORIDE 0.5 MG: 1 INJECTION, SOLUTION INTRAMUSCULAR; INTRAVENOUS; SUBCUTANEOUS at 12:15

## 2017-09-21 RX ADMIN — PROPOFOL 150 MG: 10 INJECTION, EMULSION INTRAVENOUS at 07:42

## 2017-09-21 RX ADMIN — OXYCODONE HYDROCHLORIDE 10 MG: 5 TABLET ORAL at 16:18

## 2017-09-21 RX ADMIN — NEOSTIGMINE METHYLSULFATE 2 MG: 1 INJECTION INTRAVENOUS at 09:11

## 2017-09-21 RX ADMIN — MIDAZOLAM HYDROCHLORIDE 2 MG: 1 INJECTION, SOLUTION INTRAMUSCULAR; INTRAVENOUS at 07:27

## 2017-09-21 RX ADMIN — CELECOXIB 400 MG: 200 CAPSULE ORAL at 07:02

## 2017-09-21 RX ADMIN — FENTANYL CITRATE 25 MCG: 50 INJECTION, SOLUTION INTRAMUSCULAR; INTRAVENOUS at 10:20

## 2017-09-21 RX ADMIN — ASPIRIN 325 MG: 325 TABLET, COATED ORAL at 13:21

## 2017-09-21 RX ADMIN — FENTANYL CITRATE 50 MCG: 50 INJECTION, SOLUTION INTRAMUSCULAR; INTRAVENOUS at 08:10

## 2017-09-21 RX ADMIN — SODIUM CHLORIDE, SODIUM LACTATE, POTASSIUM CHLORIDE, AND CALCIUM CHLORIDE 25 ML/HR: 600; 310; 30; 20 INJECTION, SOLUTION INTRAVENOUS at 07:01

## 2017-09-21 RX ADMIN — HYDROMORPHONE HYDROCHLORIDE 0.5 MG: 2 INJECTION, SOLUTION INTRAMUSCULAR; INTRAVENOUS; SUBCUTANEOUS at 07:49

## 2017-09-21 RX ADMIN — OXYCODONE HYDROCHLORIDE 10 MG: 5 TABLET ORAL at 13:20

## 2017-09-21 RX ADMIN — FENTANYL CITRATE 25 MCG: 50 INJECTION, SOLUTION INTRAMUSCULAR; INTRAVENOUS at 10:02

## 2017-09-21 RX ADMIN — FENTANYL CITRATE 25 MCG: 50 INJECTION, SOLUTION INTRAMUSCULAR; INTRAVENOUS at 09:57

## 2017-09-21 RX ADMIN — ACETAMINOPHEN 1000 MG: 500 TABLET ORAL at 07:02

## 2017-09-21 RX ADMIN — SODIUM CHLORIDE 125 ML/HR: 900 INJECTION, SOLUTION INTRAVENOUS at 09:58

## 2017-09-21 RX ADMIN — FENTANYL CITRATE 50 MCG: 50 INJECTION, SOLUTION INTRAMUSCULAR; INTRAVENOUS at 07:42

## 2017-09-21 RX ADMIN — POLYETHYLENE GLYCOL 3350 17 G: 17 POWDER, FOR SOLUTION ORAL at 13:19

## 2017-09-21 RX ADMIN — KETOROLAC TROMETHAMINE 30 MG: 30 INJECTION, SOLUTION INTRAMUSCULAR at 16:18

## 2017-09-21 RX ADMIN — OXYCODONE HYDROCHLORIDE 10 MG: 5 TABLET ORAL at 10:16

## 2017-09-21 RX ADMIN — FENTANYL CITRATE 25 MCG: 50 INJECTION, SOLUTION INTRAMUSCULAR; INTRAVENOUS at 10:15

## 2017-09-21 RX ADMIN — HYDROMORPHONE HYDROCHLORIDE 0.5 MG: 2 INJECTION, SOLUTION INTRAMUSCULAR; INTRAVENOUS; SUBCUTANEOUS at 08:45

## 2017-09-21 RX ADMIN — ROPIVACAINE HYDROCHLORIDE 60 ML: 5 INJECTION, SOLUTION EPIDURAL; INFILTRATION; PERINEURAL at 09:05

## 2017-09-21 RX ADMIN — ONDANSETRON 4 MG: 2 INJECTION INTRAMUSCULAR; INTRAVENOUS at 07:50

## 2017-09-21 RX ADMIN — HYDROMORPHONE HYDROCHLORIDE 0.5 MG: 2 INJECTION, SOLUTION INTRAMUSCULAR; INTRAVENOUS; SUBCUTANEOUS at 08:20

## 2017-09-21 RX ADMIN — ROCURONIUM BROMIDE 50 MG: 10 INJECTION, SOLUTION INTRAVENOUS at 07:42

## 2017-09-21 RX ADMIN — SODIUM CHLORIDE 125 ML/HR: 9 INJECTION, SOLUTION INTRAVENOUS at 09:58

## 2017-09-21 RX ADMIN — CEFAZOLIN 2 G: 10 INJECTION, POWDER, FOR SOLUTION INTRAVENOUS; PARENTERAL at 16:18

## 2017-09-21 RX ADMIN — TRANEXAMIC ACID 1000 MG: 100 INJECTION, SOLUTION INTRAVENOUS at 10:22

## 2017-09-21 RX ADMIN — CEFAZOLIN 2 G: 10 INJECTION, POWDER, FOR SOLUTION INTRAVENOUS; PARENTERAL at 07:53

## 2017-09-21 RX ADMIN — METOPROLOL TARTRATE 2 MG: 5 INJECTION INTRAVENOUS at 08:20

## 2017-09-21 NOTE — DISCHARGE SUMMARY
Ortho Discharge Summary    Patient ID:  Abhishek Segura  [id number]  male  61 y.o.  1950    Admit date: 9/21/2017    Discharge date: 9/21/2017    Admitting Physician: Catarino Allen MD     Consulting Physician(s):   Treatment Team: Attending Provider: Catarino Allen MD    Date of Surgery:   9/21/2017     Preoperative Diagnosis:  LEFT HIP OA     Postoperative Diagnosis:   LEFT HIP OA     Procedure(s):     LEFT TOTAL HIP ARTHROPLASTY ANTERIOR APPROACH WITH NAVIGATION [employer] [employer])     Anesthesia Type:   General     Surgeon: Catarino Allen MD                            HPI:  Pt is a 61 y.o. male who has a history of LEFT HIP OA   with pain and limitations of activities of daily living who presents at this time for a  left LELO following the failure of conservative management. PMH:   Past Medical History:   Diagnosis Date    Arthritis     Chronic obstructive pulmonary disease (HCC)     Chronic pain     back    GERD (gastroesophageal reflux disease)     Hypertension     Ill-defined condition     Gout    Psychiatric disorder     Generalized Anxiety Disorder    Psychiatric disorder     ETOH abuse       Body mass index is 30.97 kg/(m^2). BMI greater than 30 is classified as obesity. Medications upon admission :   Prior to Admission Medications   Prescriptions Last Dose Informant Patient Reported? Taking? albuterol (PROVENTIL HFA, VENTOLIN HFA, PROAIR HFA) 90 mcg/actuation inhaler 9/20/2017 at 2000  Yes Yes   Sig: Take 2 Puffs by inhalation daily. allopurinol (ZYLOPRIM) 300 mg tablet 9/21/2017 at 0500  Yes Yes   Sig: Take 300 mg by mouth daily. budesonide-formoterol (SYMBICORT) 160-4.5 mcg/actuation HFA inhaler 9/21/2017 at 0500  Yes Yes   Sig: Take 2 Puffs by inhalation two (2) times a day. busPIRone (BUSPAR) 15 mg tablet 9/21/2017 at 0500  Yes Yes   Sig: Take 15 mg by mouth three (3) times daily.  Takes 2 times a day   citalopram (CELEXA) 40 mg tablet 9/20/2017 at 1800  Yes Yes   Sig: Take 40 mg by mouth daily. diclofenac EC (VOLTAREN) 75 mg EC tablet   Yes Yes   Sig: Take 1 Tab by mouth two (2) times a day. Hold for two weeks. loratadine (CLARITIN) 10 mg tablet 2017 at 0500  Yes Yes   Sig: Take 10 mg by mouth. omeprazole (PRILOSEC) 20 mg capsule 2017 at 0500  Yes Yes   Sig: Take 20 mg by mouth daily. oxyCODONE-acetaminophen (PERCOCET)  mg per tablet 2017 at 0500  Yes Yes   Sig: Take 1 Tab by mouth every eight (8) hours as needed for Pain. testosterone cypionate (DEPOTESTOTERONE CYPIONATE) 200 mg/mL injection   Yes Yes   Si mL by IntraMUSCular route every fourteen (14) days. Max Daily Amount: 200 mg. Hold for 30 days. tiotropium (SPIRIVA) 18 mcg inhalation capsule 2017 at 1800  Yes Yes   Sig: Take 1 Cap by inhalation daily. valsartan (DIOVAN) 320 mg tablet 2017 at 0500  Yes Yes   Sig: Take 320 mg by mouth daily. Facility-Administered Medications: None        Allergies: Allergies   Allergen Reactions    Adhesive Tape-Silicones Other (comments)     Pulls skin off    Sulfa (Sulfonamide Antibiotics) Shortness of Breath        Hospital Course: The patient underwent surgery. Complications:  None; patient tolerated the procedure well. Was taken to the PACU in stable condition and then transferred to the ortho floor. Perioperative Antibiotics:  Ancef     Postoperative Pain Management:  Oxycodone     DVT Prophylaxis: Aspirin 325 mg PO BID for thirty days. Postoperative transfusions:    Number of units banked PRBCs =   none     Post Op complications: none    Hemoglobin at discharge:    Lab Results   Component Value Date/Time    HGB 12.0 (L) 2017 10:49 AM    INR 1.0 2017 10:49 AM       Dressing  - clean, dry and intact. No significant erythema or swelling. Neurovascular exam found to be within normal limits. Wound appears to be healing without any evidence of infection.       Physical Therapy started on the day following surgery and participated in bed mobility, transfers and ambulation. Gait:  Gait  Speed/Gabbi: Pace decreased (<100 feet/min)  Step Length: Right shortened, Left shortened  Gait Abnormalities: Decreased step clearance, Antalgic  Ambulation - Level of Assistance: Contact guard assistance  Distance (ft): 150 Feet (ft)  Assistive Device: Gait belt, Walker, rolling  Rail Use: Right   Stairs - Level of Assistance: Contact guard assistance  Number of Stairs Trained: 3                   Discharged to: Home. Condition on Discharge:   stable    Discharge instructions:  - Anticoagulate with Aspirin 325 mg PO BID for thirty days. - Take pain medications as prescribed  - Resume pre hospital diet      - Discharge activity: activity as tolerated  - Ambulate with Walker;    - Weight bearing status WBAT  - Wound Care Keep wound clean and dry. See discharge instruction sheet.  - Staples, if present, to be removed 10 days after surgery            -DISCHARGE MEDICATION LIST     Current Discharge Medication List      START taking these medications    Details   acetaminophen (TYLENOL) 325 mg tablet Take 2 Tabs by mouth every six (6) hours for 14 days. Qty: 112 Tab, Refills: 0      aspirin delayed-release 325 mg tablet Take 1 Tab by mouth two (2) times a day for 30 days. Qty: 60 Tab, Refills: 0      oxyCODONE IR (ROXICODONE) 10 mg tab immediate release tablet Take 0.5-1 Tabs by mouth every three (3) hours as needed. Max Daily Amount: 80 mg.  Qty: 80 Tab, Refills: 0      polyethylene glycol (MIRALAX) 17 gram/dose powder Take 17 g by mouth daily for 15 days. Qty: 255 g, Refills: 0      senna-docusate (SENNA PLUS) 8.6-50 mg per tablet Take 1 Tab by mouth two (2) times a day. Qty: 60 Tab, Refills: 0         CONTINUE these medications which have CHANGED    Details   diclofenac EC (VOLTAREN) 75 mg EC tablet Take 1 Tab by mouth two (2) times a day. Hold for two weeks.       testosterone cypionate (DEPOTESTOTERONE CYPIONATE) 200 mg/mL injection 1 mL by IntraMUSCular route every fourteen (14) days. Max Daily Amount: 200 mg. Hold for 30 days. Qty: 0 Vial, Refills: 0         CONTINUE these medications which have NOT CHANGED    Details   loratadine (CLARITIN) 10 mg tablet Take 10 mg by mouth.      valsartan (DIOVAN) 320 mg tablet Take 320 mg by mouth daily. allopurinol (ZYLOPRIM) 300 mg tablet Take 300 mg by mouth daily. omeprazole (PRILOSEC) 20 mg capsule Take 20 mg by mouth daily. busPIRone (BUSPAR) 15 mg tablet Take 15 mg by mouth three (3) times daily. Takes 2 times a day      citalopram (CELEXA) 40 mg tablet Take 40 mg by mouth daily. budesonide-formoterol (SYMBICORT) 160-4.5 mcg/actuation HFA inhaler Take 2 Puffs by inhalation two (2) times a day. tiotropium (SPIRIVA) 18 mcg inhalation capsule Take 1 Cap by inhalation daily. albuterol (PROVENTIL HFA, VENTOLIN HFA, PROAIR HFA) 90 mcg/actuation inhaler Take 2 Puffs by inhalation daily.          STOP taking these medications       oxyCODONE-acetaminophen (PERCOCET)  mg per tablet Comments:   Reason for Stopping:            per medical continuation form      -Follow up in office in 2 weeks      Signed:  Rosalynd Dance  MSN, APRN, FNP-C, [facility]  Orthopaedic Nurse Practitioner    9/21/2017  5:11 PM

## 2017-09-21 NOTE — PERIOP NOTES
Updated patient's wife Lew Maurice at [phone].  Advised patient was doing well and when patient was ready we would call her back with information of room number

## 2017-09-21 NOTE — PERIOP NOTES
Handoff Report from Operating Room to PACU    Report received from Donell Murcia RN and Darling Ritchie CRNA regarding Abhishek Segura. Surgeon(s):  Catarino Allen MD  And Procedure(s) (LRB):  LEFT TOTAL HIP ARTHROPLASTY ANTERIOR APPROACH WITH NAVIGATION ([place] [employer]) (Left)  confirmed   with allergies, drains and dressings discussed. Anesthesia type, drugs, patient history, complications, estimated blood loss, vital signs, intake and output, and last pain medication, lines, reversal medications and temperature were reviewed.

## 2017-09-21 NOTE — PROGRESS NOTES
Ortho/ NeuroSurgery NP Note    POD# 0  s/p LEFT TOTAL HIP ARTHROPLASTY ANTERIOR APPROACH WITH NAVIGATION (Johanny Shane)     Pt OOB to chair. No complaints. A&O x4   VSS Afebrile. Patient has had something to eat. No nausea. Most Recent Labs:   Lab Results   Component Value Date/Time    HGB 12.0 09/12/2017 10:49 AM    Hemoglobin A1c 6.0 09/12/2017 10:49 AM     Patient was hyponatremic pre-op and had some improvement. MRSA Screen Pre-op Negative  U/A Screen Pre-Op Negative    Body mass index is 30.97 kg/(m^2). BMI greater than 30 is classified as obesity and greater than 40 is classified as morbid obesity. STOP BANG Score: 4    Social History: No significant history. Gomez out. Patient is voiding in adequate amounts. Dressing c.d.i    Calves soft and supple; No pain with passive stretch  Sensation and motor intact  SCDs for mechanical DVT proph    Plan:  1) PT BID starting today  2) Samira-op Antibiotics Ancef  3) Aspirin 325 mg PO BID for DVT Prophylaxis  4) Protonix for GI Prophylaxis   5) Discharge plans to home with his wife.      Anibal Vee, TESSA

## 2017-09-21 NOTE — ANESTHESIA POSTPROCEDURE EVALUATION
Post-Anesthesia Evaluation and Assessment    Patient: Taylor Doe MRN: [id number]  SSN: [id number]    YOB: 1950  Age: 61 y.o. Sex: male       Cardiovascular Function/Vital Signs  Visit Vitals    /74 (BP 1 Location: Right arm, BP Patient Position: At rest)    Pulse 80    Temp 37.1 °C (98.8 °F)    Resp 12    Ht 5' 7\" (1.702 m)    Wt 89.7 kg (197 lb 12 oz)    SpO2 98%    BMI 30.97 kg/m2       Patient is status post general anesthesia for Procedure(s):  LEFT TOTAL HIP ARTHROPLASTY ANTERIOR APPROACH WITH NAVIGATION [facility] Juan Carlos). Nausea/Vomiting: None    Postoperative hydration reviewed and adequate. Pain:  Pain Scale 1: FLACC (09/21/17 1020)  Pain Intensity 1: 0 (09/21/17 1020)   Managed    Neurological Status:   Neuro (WDL): Exceptions to WDL (09/21/17 8300)  Neuro  Neurologic State: Drowsy; Eyes open spontaneously; Eyes open to voice (09/21/17 9854)  Orientation Level: Oriented to person;Oriented to place;Oriented to situation (09/21/17 1245)  Cognition: Appropriate decision making; Appropriate for age attention/concentration; Appropriate safety awareness; Follows commands (09/21/17 8556)  Speech: Clear (09/21/17 0942)  LUE Motor Response: Purposeful (09/21/17 0942)  LLE Motor Response: Purposeful;Weak (left hip replacement) (09/21/17 0942)  RUE Motor Response: Purposeful (09/21/17 0942)  RLE Motor Response: Purposeful (09/21/17 0942)   At baseline    Mental Status and Level of Consciousness: Arousable    Pulmonary Status:   O2 Device: Nasal cannula (09/21/17 1015)   Adequate oxygenation and airway patent    Complications related to anesthesia: None    Post-anesthesia assessment completed.  No concerns    Signed By: Ambika Lainez MD     September 21, 2017

## 2017-09-21 NOTE — ANESTHESIA PREPROCEDURE EVALUATION
Anesthetic History   No history of anesthetic complications            Review of Systems / Medical History  Patient summary reviewed, nursing notes reviewed and pertinent labs reviewed    Pulmonary    COPD               Neuro/Psych         Psychiatric history     Cardiovascular    Hypertension              Exercise tolerance: >4 METS     GI/Hepatic/Renal     GERD           Endo/Other  Within defined limits           Other Findings   Comments: Regular EtOH use           Physical Exam    Airway  Mallampati: II  TM Distance: 4 - 6 cm  Neck ROM: normal range of motion   Mouth opening: Normal     Cardiovascular  Regular rate and rhythm,  S1 and S2 normal,  no murmur, click, rub, or gallop             Dental  No notable dental hx       Pulmonary  Breath sounds clear to auscultation               Abdominal  GI exam deferred       Other Findings            Anesthetic Plan    ASA: 3  Anesthesia type: general    Monitoring Plan: BIS      Induction: Intravenous  Anesthetic plan and risks discussed with: Patient

## 2017-09-21 NOTE — PROGRESS NOTES
Problem: Mobility Impaired (Adult and Pediatric)  Goal: *Acute Goals and Plan of Care (Insert Text)  Physical Therapy Goals  Initiated 9/21/2017    1. Patient will move from supine to sit and sit to supine , scoot up and down and roll side to side in bed with independence within 4 days. 2. Patient will perform sit to stand with modified independence within 4 days. 3. Patient will ambulate with modified independence for 200 feet with the least restrictive device within 4 days. 4. Patient will ascend/descend 3 stairs with right sided handrail(s) with modified independence within 4 days. 5. Patient will verbalize and demonstrate understanding of anterior hip precautions per protocol within 4 days. 6. Patient will perform hip home exercise program per protocol with modified independence within 4 days. PHYSICAL THERAPY EVALUATION  Patient: Calvin Holt (55 y.o. male)  Date: 9/21/2017  Primary Diagnosis: LEFT HIP OA   Hip arthritis  Procedure(s) (LRB):  LEFT TOTAL HIP ARTHROPLASTY ANTERIOR APPROACH WITH NAVIGATION [employer]) (Left) Day of Surgery   Precautions:   WBAT (anterior)      ASSESSMENT :  Based on the objective data described below, the patient presents with decreased strength, decreased functional mobility, and steady gait s/p L anterior LELO POD 0. PTA patient lives with his wife. He underwent R LELO one year ago. He is independent with all aspects of functional mobility and ADLs and works full time. Currently, patient received sitting in the chair upon arrival (RN and NP approved). Patient required SBA for sit <> stand with RW. Patient with VSS throughout session on RA. Patient ambulated 150 feet with CGA and RW. Patient with steady gait with step through gait pattern with no antalgia noted. Patient negotiated 3 stairs with SBA and R HR. Patient with safe and steady gait on the stairs, requiring a verbal cue for sequencing.  Patient left sitting in the chair with VSS at the conclusion of PT evaluation. Patient will benefit from OP PT at discharge. He owns all recommended DME for discharge. Patient will benefit from skilled intervention to address the above impairments. Patients rehabilitation potential is considered to be Good  Factors which may influence rehabilitation potential include:   [X]         None noted  [ ]         Mental ability/status  [ ]         Medical condition  [ ]         Home/family situation and support systems  [ ]         Safety awareness  [ ]         Pain tolerance/management  [ ]         Other:        PLAN :  Recommendations and Planned Interventions:  [X]           Bed Mobility Training             [X]    Neuromuscular Re-Education  [X]           Transfer Training                   [ ]    Orthotic/Prosthetic Training  [X]           Gait Training                         [ ]    Modalities  [X]           Therapeutic Exercises           [ ]    Edema Management/Control  [X]           Therapeutic Activities            [X]    Patient and Family Training/Education  [ ]           Other (comment):     Frequency/Duration: Patient will be followed by physical therapy  twice daily to address goals. Discharge Recommendations: Outpatient  Further Equipment Recommendations for Discharge: RW-owns       SUBJECTIVE:   Patient stated I know what I shouldn't do. I don't want to set myself back.       OBJECTIVE DATA SUMMARY:   HISTORY:    Past Medical History:   Diagnosis Date    Arthritis      Chronic obstructive pulmonary disease (HCC)      Chronic pain       back    GERD (gastroesophageal reflux disease)      Hypertension      Ill-defined condition       Gout    Psychiatric disorder       Generalized Anxiety Disorder    Psychiatric disorder       ETOH abuse     Past Surgical History:   Procedure Laterality Date    HX COLECTOMY   2000     diverticulitis    HX OTHER SURGICAL Left       mastoidectomy and inner ear surgery- age  18   [de-identified] OTHER SURGICAL         investigational stem cell [facility]    HX TONSILLECTOMY        KNEE SCOPE,MED OR LAT MENIS REPAIR Left       Prior Level of Function/Home Situation: patient lives with his wife. He underwent R LELO one year ago. He is independent with all aspects of functional mobility and ADLs and works full time. Personal factors and/or comorbidities impacting plan of care:      Home Situation  Home Environment: Private residence  # Steps to Enter: 3  Rails to Enter: Yes  Hand Rails : Right  One/Two Story Residence: Two story, live on 1st floor  Living Alone: No  Support Systems: Spouse/Significant Other/Partner  Patient Expects to be Discharged to[de-identified] Private residence  Current DME Used/Available at Home: Cane, straight, Adaptive dressing aides, Adaptive bathing aides, Grab bars, Walker, rolling  Tub or Shower Type: Tub/Shower combination     EXAMINATION/PRESENTATION/DECISION MAKING:   Critical Behavior:  Neurologic State: Alert, Drowsy (drowsy at times)  Orientation Level: Oriented to person, Oriented to place, Oriented to situation  Cognition: Appropriate decision making, Appropriate for age attention/concentration, Appropriate safety awareness, Follows commands     Hearing:     Skin:  intact  Edema: none  Range Of Motion:  AROM: Generally decreased, functional           PROM: Generally decreased, functional           Strength:    Strength: Generally decreased, functional                    Tone & Sensation:   Tone: Normal              Sensation: Intact               Coordination:  Coordination: Within functional limits  Vision:      Functional Mobility:  Bed Mobility:  Rolling:  (sitting in chair upon arrival)           Transfers:  Sit to Stand: Stand-by asssistance  Stand to Sit: Stand-by asssistance        Bed to Chair: Contact guard assistance              Balance:   Sitting: Intact; Without support  Standing: Intact; With support  Ambulation/Gait Training:  Distance (ft): 150 Feet (ft)  Assistive Device: Gait belt;Walker, rolling  Ambulation - Level of Assistance: Contact guard assistance     Gait Description (WDL): Exceptions to WDL  Gait Abnormalities: Decreased step clearance; Antalgic              Speed/Gabbi: Pace decreased (<100 feet/min)  Step Length: Right shortened;Left shortened                                           Stairs:  Number of Stairs Trained: 3  Stairs - Level of Assistance: Contact guard assistance              Rail Use: Right      Therapeutic Exercises:   Hip HEP     Functional Measure:  Barthel Index:      Bathin  Bladder: 10  Bowels: 10  Groomin  Dressin  Feeding: 10  Mobility: 15  Stairs: 5  Toilet Use: 5  Transfer (Bed to Chair and Back): 10  Total: 75         Barthel and G-code impairment scale:  Percentage of impairment CH  0% CI  1-19% CJ  20-39% CK  40-59% CL  60-79% CM  80-99% CN  100%   Barthel Score 0-100 100 99-80 79-60 59-40 20-39 1-19    0   Barthel Score 0-20 20 17-19 13-16 9-12 5-8 1-4 0      The Barthel ADL Index: Guidelines  1. The index should be used as a record of what a patient does, not as a record of what a patient could do. 2. The main aim is to establish degree of independence from any help, physical or verbal, however minor and for whatever reason. 3. The need for supervision renders the patient not independent. 4. A patient's performance should be established using the best available evidence. Asking the patient, friends/relatives and nurses are the usual sources, but direct observation and common sense are also important. However direct testing is not needed. 5. Usually the patient's performance over the preceding 24-48 hours is important, but occasionally longer periods will be relevant. 6. Middle categories imply that the patient supplies over 50 per cent of the effort. 7. Use of aids to be independent is allowed. Melvin Salazar., Barthel, D.W. (4603). Functional evaluation: the Barthel Index. [address] [facility] (14)2.   ROCCO León Sa, Caitlyn Swain., Marcelo Organ (1999). Measuring the change indisability after inpatient rehabilitation; comparison of the responsiveness of the Barthel Index and Functional [place] Measure. Journal of Neurology, Neurosurgery, and Psychiatry, 66(4), 375-497. FREDA Looney, GERSON Salazar, & Lindsay Vivas M.A. (2004.) Assessment of post-stroke quality of life in cost-effectiveness studies: The usefulness of the Barthel Index and the EuroQoL-5D. Quality of Life Research, 13, 300-72            G codes: In compliance with CMSs Claims Based Outcome Reporting, the following G-code set was chosen for this patient based on their primary functional limitation being treated: The outcome measure chosen to determine the severity of the functional limitation was the Barthel with a score of 75/100 which was correlated with the impairment scale.       · Mobility - Walking and Moving Around:               - CURRENT STATUS:    CJ - 20%-39% impaired, limited or restricted               - GOAL STATUS:           CI - 1%-19% impaired, limited or restricted               - D/C STATUS:                       ---------------To be determined---------------      Physical Therapy Evaluation Charge Determination   History Examination Presentation Decision-Making   LOW Complexity : Zero comorbidities / personal factors that will impact the outcome / POC MEDIUM Complexity : 3 Standardized tests and measures addressing body structure, function, activity limitation and / or participation in recreation  MEDIUM Complexity : Evolving with changing characteristics  Other outcome measures Barthel  LOW       Based on the above components, the patient evaluation is determined to be of the following complexity level: LOW      Pain:  Pain Scale 1: Numeric (0 - 10)  Pain Intensity 1: 2  Pain Location 1: Hip  Pain Orientation 1: Left  Pain Description 1: Dull  Pain Intervention(s) 1: Medication (see MAR);Distraction; Ice  Activity Tolerance:   Good, VSS on RA. Please refer to the flowsheet for vital signs taken during this treatment. After treatment:   [X]         Patient left in no apparent distress sitting up in chair  [ ]         Patient left in no apparent distress in bed  [X]         Call bell left within reach  [X]         Nursing notified  [ ]         Caregiver present  [ ]         Bed alarm activated      COMMUNICATION/EDUCATION:   The patients plan of care was discussed with: Registered Nurse and . [X]         Fall prevention education was provided and the patient/caregiver indicated understanding. [X]         Patient/family have participated as able in goal setting and plan of care. [X]         Patient/family agree to work toward stated goals and plan of care. [ ]         Patient understands intent and goals of therapy, but is neutral about his/her participation. [ ]         Patient is unable to participate in goal setting and plan of care.      Thank you for this referral.  Janeen Tavera, PT, DPT   Time Calculation: 25 mins

## 2017-09-21 NOTE — PERIOP NOTES
TRANSFER - OUT REPORT:    Verbal report given to Ozzy Alejandro Rn(name) on Evette Anderson  being transferred to [facility](unit) for routine progression of care       Report consisted of patients Situation, Background, Assessment and   Recommendations(SBAR). Information from the following report(s) SBAR, OR Summary, Procedure Summary, Intake/Output, MAR and Recent Results was reviewed with the receiving nurse. Opportunity for questions and clarification was provided.       Patient transported with:   O2 @ 2 liters  Tech

## 2017-09-21 NOTE — DISCHARGE INSTRUCTIONS
Irineo Hoff  Surgery: Total Hip Replacement  Surgeon:   Mitch Snyder MD  Surgery Date:  9/21/2017     To relieve pain:   Use ice/gel packs.  Put the ice pack directly over the wound, or anywhere you are hurting or swollen.  To control pain and swelling, keep ice on regularly, especially after physical activity.  The packs should stay cold for 3-4 hours. When it is not cold anymore, rotate with the packs in the freezer.  Elevate your leg. This will also keep swelling down.  Rest for at least 20 minutes between activity or exercises.  To keep track of your pain medications, write down what you take and when you take it.  The last dose of pain medication you got in the hospital was:       Medication    Dose    Date & Time             Choose your medications based on the pain scale below:     To keep your pain under control, take Tylenol every 6 hours for 14 days - even if you feel like you dont need it.  For mild to moderate pain (1-6 on pain scale), take one pain pill every 3-4 hours as needed.  For severe pain (7-10 on pain scale), take two pain pills every 3-4 hours as needed.  To prevent nausea, take your pain medications with food. Pain Scale                   As your pain lessens:     Slowly start taking less pain medication. You may do this by waiting longer between doses or by taking smaller doses.  Stop using the pain medications as soon as you no longer need it, usually in 2-3 weeks.  Aspirin   To prevent blood clots, you will need to take Aspirin 325 mg twice a day for 30 days.  To prevent stomach upset or bleeding:   Do not take non-steroidal anti-inflammatory medications (Ibuprofen, Advil, Motrin, Naproxen, etc.)    Take Pepcid 20 mg twice a day, or a similar home medication, while you are taking a blood thinner.              OPSITE (Honeycomb dressing)     Keep your clear, waterproof dressing in place for 5-7 days after your leave the hospital.     If you are still having drainage, you will need to change your dressing in 5-7 days. You will be given one extra dressing to use at home.  If there is no more drainage from the wound, you may leave it open to the air. Abner Stanislaus [place] is a type of skin glue and mesh that is keeping your wound together.  Leave this covering alone. Do not peel it off.  Do not apply oils or lotions over it.  You may shower with this dressing but do not soak it. Gently pat your wound dry when you get out of the shower.  DO NOTs:   Do not rub your surgical wound   Do not put lotion or oils on your wound.  Do not take a tub bath or go swimming until your doctor says it is ok.  You may shower with this dressing over your wound.  After showering pat the dressing dry.  If you have staples a home health nurse will remove them in about 10 days.  To increase and promote healing:     Stop Smoking (or at least cut back on       Smoking).  Eat a well-balanced diet (high in protein       and vitamin C).  If you have a poor appetite, drink Ensure, Glucerna, or [place] Instant Breakfast for the next 30 days.  If you are diabetic, you should control your blood         sugars to prevent infection and help your wound         to heal.                         To prevent constipation, stay active and drink plenty of fluid.  While using pain medications, you should also take stool softeners and laxatives, such as Pericolace       and Miralax.  If you are having too many bowel       Movements, then you may need       to stop taking the laxatives.  You should have a bowel movement 3-4 days        after surgery and then at least every other day while       on pain medication.        To improve your recovery, you must be active!  Use your walker and take short walks         (in your home). about every 2 hours during the day.  Try to increase how far you walk each day.  You can put as much weight on your leg as you can tolerate while walking. [facility] physical therapy will come to your        home a few times per week to teach you how to        get out of bed, to safely walk in your home, and        to do your exercises.  NO DRIVING until your surgeon tells you it is ok.  You can return to work when cleared by a physician.  Please call your physician immediately if you have:     Constant bleeding from your wound.  Increasing redness or swelling around your wound (Some warmth, bruising and swelling is normal).  Change in wound drainage (increase in amount, color, or bad smell).  Change in mental status (unusual behavior   Temperature over 101.5 degrees Fahrenheit      Pain or redness in the calf (back of your lower leg - see picture)     Increased swelling of the thigh, ankle, calf, or foot.  Emergency: CALL 911 if you have:     Shortness of breath     Chest pain when you cough or taking a deep breath     Please call your surgeons office at [phone] for a follow up appointment. _   You should call as soon as you get home or the next day after discharge. Ask to make an appointment in 2 weeks.  If you have questions or concerns during normal business hours, you may reach Dr. Negrito Dickinson' Team at [phone].

## 2017-09-21 NOTE — H&P
Date of Surgery Update:  Kari Velasquez was seen and examined on the day of surgery prior to the procedure. There were no significant clinical changes since the completion of the History and Physical.    Exam today prior to surgery showed no acute cardiac findings, no respiratory difficulty, and no abdominal complaints or pain. This patient is a candidate for TXA. Documentation of Medical Necessity:    Symptoms: pain with activity and at rest, antalgia, interferes with ADLs  Conservative Treatment: activity modification, multiple medications, injection  Physical Findings: limited painful ROM, antalgia on ambulation, crepitation  Imaging: significant OA, sclerosis and osteophytes    Indications:   Failure of conservative treatments with daily pain and functional limitations. Appropriate imaging demonstrating significant disease. Appropriate physical findings consistent with significant degenerative joint disease. All pertinent risks, benefits, and alternatives to operative management including continued conservative care were explained at length. The patient has elected to proceed with appropriately indicated and medically necessary total joint arthroplasty. They understand no guarantees can be given about the outcome.     Signed By: KIRK Price     September 21, 2017 7:37 AM

## 2017-09-21 NOTE — IP AVS SNAPSHOT
[address] [facility] 
[phone] Patient: Dorie Marshall MRN: [id number] :1950 You are allergic to the following Allergen Reactions Adhesive Tape-Silicones Other (comments) Pulls skin off  
    
 Sulfa (Sulfonamide Antibiotics) Shortness of Breath Recent Documentation Height Weight BMI Smoking Status 1.702 m 89.7 kg 30.97 kg/m2 Former Smoker Emergency Contacts Name Discharge Info Relation Home Work Mobile [address] CAREGIVER [3] Spouse [3] .[id number] About your hospitalization You were admitted on:  2017 You last received care in the:  [facility]TH[facility] You were discharged on:  2017 Unit phone number:  [phone] Why you were hospitalized Your primary diagnosis was:  Not on File Your diagnoses also included: Hip Arthritis Providers Seen During Your Hospitalizations Provider Role Specialty Primary office phone Watson Flor MD Attending Provider Orthopedic Surgery [phone] Your Primary Care Physician (PCP) Primary Care Physician Office Phone Office Fax Caleb Malcolm [phone] Follow-up Information Follow up With Details Comments Contact Info Watosn Flor MD Schedule an appointment as soon as possible for a visit in 2 weeks  [facility] [address] [facility] 
[phone] Current Discharge Medication List  
  
START taking these medications Dose & Instructions Dispensing Information Comments Morning Noon Evening Bedtime  
 acetaminophen 325 mg tablet Commonly known as:  TYLENOL Your last dose was: Your next dose is:    
   
   
 Dose:  650 mg Take 2 Tabs by mouth every six (6) hours for 14 days. Quantity:  112 Tab Refills:  0 aspirin delayed-release 325 mg tablet Your last dose was: Your next dose is:    
   
   
 Dose:  325 mg Take 1 Tab by mouth two (2) times a day for 30 days. Quantity:  60 Tab Refills:  0  
     
   
   
   
  
 oxyCODONE IR 10 mg Tab immediate release tablet Commonly known as:  [place] Your last dose was: Your next dose is:    
   
   
 Dose:  5-10 mg Take 0.5-1 Tabs by mouth every three (3) hours as needed. Max Daily Amount: 80 mg.  
 Quantity:  80 Tab Refills:  0  
     
   
   
   
  
 polyethylene glycol 17 gram/dose powder Commonly known as:  Lobo Bimler Your last dose was: Your next dose is:    
   
   
 Dose:  17 g Take 17 g by mouth daily for 15 days. Quantity:  255 g Refills:  0  
     
   
   
   
  
 senna-docusate 8.6-50 mg per tablet Commonly known as:  SENNA PLUS Your last dose was: Your next dose is:    
   
   
 Dose:  1 Tab Take 1 Tab by mouth two (2) times a day. Quantity:  60 Tab Refills:  0 CONTINUE these medications which have CHANGED Dose & Instructions Dispensing Information Comments Morning Noon Evening Bedtime  
 diclofenac EC 75 mg EC tablet Commonly known as:  VOLTAREN What changed:  additional instructions Your last dose was: Your next dose is:    
   
   
 Dose:  75 mg Take 1 Tab by mouth two (2) times a day. Hold for two weeks. Refills:  0  
     
   
   
   
  
 testosterone cypionate 200 mg/mL injection Commonly known as:  DEPOTESTOTERONE CYPIONATE What changed:  additional instructions Your last dose was: Your next dose is:    
   
   
 Dose:  200 mg  
1 mL by IntraMUSCular route every fourteen (14) days. Max Daily Amount: 200 mg. Hold for 30 days. Quantity:  0 Vial  
Refills:  0 CONTINUE these medications which have NOT CHANGED Dose & Instructions Dispensing Information Comments Morning Noon Evening Bedtime  
 albuterol 90 mcg/actuation inhaler Commonly known as:  PROVENTIL HFA, VENTOLIN HFA, PROAIR HFA Your last dose was: Your next dose is:    
   
   
 Dose:  2 Puff Take 2 Puffs by inhalation daily. Refills:  0  
     
   
   
   
  
 allopurinol 300 mg tablet Commonly known as:  Acquwilder Hurdman Your last dose was: Your next dose is:    
   
   
 Dose:  300 mg Take 300 mg by mouth daily. Refills:  0  
     
   
   
   
  
 budesonide-formoterol 160-4.5 mcg/actuation HFA inhaler Commonly known as:  SYMBICORT Your last dose was: Your next dose is:    
   
   
 Dose:  2 Puff Take 2 Puffs by inhalation two (2) times a day. Refills:  0  
     
   
   
   
  
 busPIRone 15 mg tablet Commonly known as:  BUSPAR Your last dose was: Your next dose is:    
   
   
 Dose:  15 mg Take 15 mg by mouth three (3) times daily. Takes 2 times a day Refills:  0  
     
   
   
   
  
 citalopram 40 mg tablet Commonly known as:  Carolineabram Majanog Your last dose was: Your next dose is:    
   
   
 Dose:  40 mg Take 40 mg by mouth daily. Refills:  0 CLARITIN 10 mg tablet Generic drug:  loratadine Your last dose was: Your next dose is:    
   
   
 Dose:  10 mg Take 10 mg by mouth. Refills:  0  
     
   
   
   
  
 omeprazole 20 mg capsule Commonly known as:  PRILOSEC Your last dose was: Your next dose is:    
   
   
 Dose:  20 mg Take 20 mg by mouth daily. Refills:  0  
     
   
   
   
  
 tiotropium 18 mcg inhalation capsule Commonly known as:  Deysi Revels Your last dose was: Your next dose is:    
   
   
 Dose:  1 Cap Take 1 Cap by inhalation daily. Refills:  0  
     
   
   
   
  
 valsartan 320 mg tablet Commonly known as:  DIOVAN Your last dose was:     
   
Your next dose is:    
   
   
 Dose:  320 mg  
 Take 320 mg by mouth daily. Refills:  0 STOP taking these medications PERCOCET  mg per tablet Generic drug:  oxyCODONE-acetaminophen Where to Get Your Medications Information on where to get these meds will be given to you by the nurse or doctor. ! Ask your nurse or doctor about these medications  
  acetaminophen 325 mg tablet  
 aspirin delayed-release 325 mg tablet  
 oxyCODONE IR 10 mg Tab immediate release tablet  
 polyethylene glycol 17 gram/dose powder  
 senna-docusate 8.6-50 mg per tablet Discharge Instructions Sugey Carpio Surgery: Total Hip Replacement Surgeon:   Trini Lu MD 
Surgery Date:  9/21/2017 ? To relieve pain: ? Use ice/gel packs. ? Put the ice pack directly over the wound, or anywhere you are hurting or swollen. ? To control pain and swelling, keep ice on regularly, especially after physical activity. ? The packs should stay cold for 3-4 hours. When it is not cold anymore, rotate with the packs in the freezer. ? Elevate your leg. This will also keep swelling down. ? Rest for at least 20 minutes between activity or exercises. ? To keep track of your pain medications, write down what you take and when you take it. ? The last dose of pain medication you got in the hospital was:    
 
Medication Dose Date & Time ? Choose your medications based on the pain scale below: ? To keep your pain under control, take Tylenol every 6 hours for 14 days - even if you feel like you dont need it. ? For mild to moderate pain (1-6 on pain scale), take one pain pill every 3-4 hours as needed. ? For severe pain (7-10 on pain scale), take two pain pills every 3-4 hours as needed. ? To prevent nausea, take your pain medications with food. Pain Scale ? As your pain lessens: ? Slowly start taking less pain medication. You may do this by waiting longer between doses or by taking smaller doses. ? Stop using the pain medications as soon as you no longer need it, usually in 2-3 weeks. ? Aspirin ? To prevent blood clots, you will need to take Aspirin 325 mg twice a day for 30 days. ? To prevent stomach upset or bleeding: ? Do not take non-steroidal anti-inflammatory medications (Ibuprofen, Advil, Motrin, Naproxen, etc.) ? Take Pepcid 20 mg twice a day, or a similar home medication, while you are taking a blood thinner. OPSITE (Honeycomb dressing) ? Keep your clear, waterproof dressing in place for 5-7 days after your leave the hospital. 
 
? If you are still having drainage, you will need to change your dressing in 5-7 days. You will be given one extra dressing to use at home. ? If there is no more drainage from the wound, you may leave it open to the air. PRINEO DRESSING INSTRUCTIONS ? [place] is a type of skin glue and mesh that is keeping your wound together. ? Leave this covering alone. Do not peel it off.  
 
? Do not apply oils or lotions over it. ? You may shower with this dressing but do not soak it. Gently pat your wound dry when you get out of the shower. ? DO NOTs: 
? Do not rub your surgical wound ? Do not put lotion or oils on your wound. ? Do not take a tub bath or go swimming until your doctor says it is ok. 
 
? You may shower with this dressing over your wound. ? After showering pat the dressing dry. ? If you have staples a home health nurse will remove them in about 10 days. ? To increase and promote healing: 
 
? Stop Smoking (or at least cut back on 
     Smoking). ? Eat a well-balanced diet (high in protein 
     and vitamin C).  
 
? If you have a poor appetite, drink Ensure, Glucerna, or [place] Instant Breakfast for the next 30 days. ? If you are diabetic, you should control your blood  
      sugars to prevent infection and help your wound  
      to heal. 
               
 
 
 
? To prevent constipation, stay active and drink plenty of fluid. ? While using pain medications, you should also take stool softeners and laxatives, such as Pericolace 
     and Miralax. ? If you are having too many bowel Movements, then you may need 
     to stop taking the laxatives. ? You should have a bowel movement 3-4 days  
     after surgery and then at least every other day while 
     on pain medication. ? To improve your recovery, you must be active! ? Use your walker and take short walks (in your home). about every 2 hours during the day. ? Try to increase how far you walk each day. ? You can put as much weight on your leg as you can tolerate while walking. WBAT   
 
? Home health physical therapy will come to your 
      home a few times per week to teach you how to 
      get out of bed, to safely walk in your home, and 
      to do your exercises. ? NO DRIVING until your surgeon tells you it is ok. 
 
? You can return to work when cleared by a physician. ? Please call your physician immediately if you have: 
 
? Constant bleeding from your wound. ? Increasing redness or swelling around your wound (Some warmth, bruising and swelling is normal). ? Change in wound drainage (increase in amount, color, or bad smell). ? Change in mental status (unusual behavior ? Temperature over 101.5 degrees Fahrenheit ? Pain or redness in the calf (back of your lower leg  see picture) ? Increased swelling of the thigh, ankle, calf, or foot. ? Emergency: CALL 911 if you have: 
 
? Shortness of breath ? Chest pain when you cough or taking a deep breath ? Please call your surgeons office at [phone] for a follow up appointment.   
_ 
 ? You should call as soon as you get home or the next day after discharge. Ask to make an appointment in 2 weeks. ? If you have questions or concerns during normal business hours, you may reach Dr. Rosa Maria Moran' Team at [phone]. Discharge Orders None Introducing [facility] SERVICES! Rick Zavala introduces Pops patient portal. Now you can access parts of your medical record, email your doctor's office, and request medication refills online. 1. In your internet browser, go to [email or web address] [email or web address] 2. Click on the First Time User? Click Here link in the Sign In box. You will see the New Member Sign Up page. 3. Enter your Pops Access Code exactly as it appears below. You will not need to use this code after youve completed the sign-up process. If you do not sign up before the expiration date, you must request a new code. · Pops Access Code: [email or web address] Expires: 11/22/2017  4:12 PM 
 
4. Enter the last four digits of your Social Security Number (xxxx) and Date of Birth (mm/dd/yyyy) as indicated and click Submit. You will be taken to the next sign-up page. 5. Create a Pops ID. This will be your Pops login ID and cannot be changed, so think of one that is secure and easy to remember. 6. Create a Pops password. You can change your password at any time. 7. Enter your Password Reset Question and Answer. This can be used at a later time if you forget your password. 8. Enter your e-mail address. You will receive e-mail notification when new information is available in [address] 9. Click Sign Up. You can now view and download portions of your medical record. 10. Click the Download Summary menu link to download a portable copy of your medical information. If you have questions, please visit the Frequently Asked Questions section of the Pops website. Remember, Pops is NOT to be used for urgent needs. For medical emergencies, dial 911. Now available from your iPhone and Android! General Information Please provide this summary of care documentation to your next provider. Patient Signature:  ____________________________________________________________ Date:  ____________________________________________________________  
  
Rehan Shackle Provider Signature:  ____________________________________________________________ Date:  ____________________________________________________________

## 2017-09-22 ENCOUNTER — TELEPHONE (OUTPATIENT)
Dept: CASE MANAGEMENT | Age: 67
End: 2017-09-22

## 2017-09-22 NOTE — OP NOTES
Randall Montez MD - Adult Reconstruction and Total Joint Replacement    Evette Anderson - MRN [id number] - : 1950 (61 y.o.)      Date: 2017    Pre-operative Diagnosis: Left Hip DJD     Post-operative Diagnosis: same     Procedure:  (1) Left Total Hip Arthroplasty, Direct Anterior Approach    (2) Intraoperative Fluoroscopy    (3) Imageless Computer Navigation     Implants Used:     Implant Name Type Inv. Item Serial No.  Lot No. LRB No. Used Action   SHELL ACET M-HLE 58MM GRA -- INTEGRIP REV - [id number]  SHELL ACET M-HLE 58MM GRA -- INTEGRIP REV [id number] [employer] N/A Left 1 Implanted   alteon bone screw, 6.5 diameter x 25mm legth   [id number]  N/A Left 1 Implanted   alteon bone screw 6.5 diameter x 30mm legnth   [id number]  N/A Left 1 Implanted   LINER GXL NTRL CUP 36MM GRP3 --  - [id number]  LINER GXL NTRL CUP 36MM GRP3 --  [id number] [employer] N/A Left 1 Implanted   STEM [facility] W/HA STD OFFST SZ13 -- COLLARED - [id number]  STEM ELMNT W/HA STD OFFST DF77 -- COLLARED [id number] [employer] N/A Left 1 Implanted   HEAD FEM CER 36MM OD +0MM -- BIOLOX DELTA - [id number]   HEAD FEM CER 36MM OD +0MM -- Charol Tiger [id number] [employer] N/A Left 1 Implanted       Anesthesia: GETA + local    Surgeon: Randall Montez     Assist: KIRK Rosado    EBL: 300cc     Drains: none     Specimens: none     Complications: none     Condition: stable to PACU     Brief History: Longstanding hip pain, unresponsive to conservative treatment. The risks, benefits, and alternatives to total hip replacement were thoroughly explained. The patient understood no guarantees could be given about the outcome of the procedure and wished to proceed. Description of Procedure: After being identified in the preoperative holding area and having their operative site marked, the patient was brought back to the operating room where they underwent anesthesia to good effect.  They were  placed in the supine position with a bump under the hip. The operative extremity was then prepped and draped in the usual fashion using sterile technique. Preoperative antibiotics had been administered. An appropriate time-out was performed. We began by placing the pelvic tracker with two percutaneous Shanz pins into the ipsilateral ilium. The pelvis was then registered with the navigation system. A curvilinear incision was made 2 fingerbreadths lateral and distal to the ASIS. The skin flaps were developed down to the tensor fascia. This was divided sharply. Blunt dissection was taken medially over the tensor muscle. Retractors were placed superior and inferior to the femoral neck. The anterior fat pad was debrided. Circumflex vessels were identified and cauterized. A provisional neck cut was performed. The head was removed from the acetabulum. We then excised the labrum. We sequentially reamed for the acetabular shell. This was placed in the appropriate abduction and version. Position was confirmed by fluoroscopy and navigation. The liner was then impacted into the locking mechanism. We then turned our attention to the femur. Elevators were used to gain access to the proximal femur. Soft tissue releases were performed as necessary. The lateralizer was utilized. We then sequentially broached up to the final size trial. We placed a trial neck and head and had excellent restoration of leg length and offset with good soft tissue balance. We selected these as our final implants. Final components were inserted and the hip was reduced after thorough lavage. Restoration of appropriate leg length was confirmed by navigation. The tensor fascia was closed. Periarticular injection was performed. Skin closure was performed in layers. A sterile dressing was applied. The patient was awakened, moved to the stretcher, and taken to the recovery room in stable condition. At the conclusion of the procedure, all counts were correct.  There were no immediate complications. Additional Procedure:   Date: 9/21/2017    Preoperative diagnosis: LEFT HIP OA     Postoperative diagnosis: LEFT HIP OA     Procedure performed: Procedure(s):  LEFT TOTAL HIP ARTHROPLASTY ANTERIOR APPROACH WITH NAVIGATION [facility])    Anesthesia: General    Surgeon(s) and Role:     * Tish Garza MD - Primary    Assistant: KIRK Carbone      This describes the use of intraoperative fluoroscopy. Fluoroscopic imaging was utilized in orthogonal planes as well as using live technique for all phases of the procedure, described separately in the operative report, including hardware placement.     Tish Garza MD

## 2017-09-22 NOTE — PROGRESS NOTES
Discharge instructions discussed with the patient IV removed. Prescriptions Provided. Patient discharged to front entrance. Patient verbalized an understanding of his instructions.

## 2017-09-25 NOTE — PROGRESS NOTES
Late entry    Pt discharge on 9/21/17 evening  to home after  rounds. As per pt NP Priya's request CM scheduled HH/PT on 9/22/17 deborah. HH/PT referral send to At home care [facility] through [facility]. HH accepted pt. CM spoke with pt on phone and provided [facility] information and  contact number. Provider information updated to pt CALEB.    Joshua  Ext [phone]

## 2017-10-11 ENCOUNTER — APPOINTMENT (OUTPATIENT)
Dept: ULTRASOUND IMAGING | Age: 67
DRG: 291 | End: 2017-10-11
Attending: INTERNAL MEDICINE
Payer: COMMERCIAL

## 2017-10-11 ENCOUNTER — HOSPITAL ENCOUNTER (INPATIENT)
Age: 67
LOS: 2 days | Discharge: HOME | DRG: 291 | End: 2017-10-13
Attending: EMERGENCY MEDICINE | Admitting: HOSPITALIST
Payer: COMMERCIAL

## 2017-10-11 ENCOUNTER — APPOINTMENT (OUTPATIENT)
Dept: CT IMAGING | Age: 67
DRG: 291 | End: 2017-10-11
Attending: EMERGENCY MEDICINE
Payer: COMMERCIAL

## 2017-10-11 ENCOUNTER — APPOINTMENT (OUTPATIENT)
Dept: GENERAL RADIOLOGY | Age: 67
DRG: 291 | End: 2017-10-11
Attending: EMERGENCY MEDICINE
Payer: COMMERCIAL

## 2017-10-11 DIAGNOSIS — J81.0 ACUTE PULMONARY EDEMA (HCC): ICD-10-CM

## 2017-10-11 DIAGNOSIS — J96.21 ACUTE ON CHRONIC RESPIRATORY FAILURE WITH HYPOXIA (HCC): Primary | ICD-10-CM

## 2017-10-11 DIAGNOSIS — R77.8 ELEVATED TROPONIN: ICD-10-CM

## 2017-10-11 DIAGNOSIS — J44.9 CHRONIC OBSTRUCTIVE PULMONARY DISEASE, UNSPECIFIED COPD TYPE (HCC): ICD-10-CM

## 2017-10-11 PROBLEM — J81.1 PULMONARY EDEMA: Status: ACTIVE | Noted: 2017-10-11

## 2017-10-11 PROBLEM — I50.21 ACUTE SYSTOLIC CONGESTIVE HEART FAILURE, NYHA CLASS 4 (HCC): Status: ACTIVE | Noted: 2017-10-11

## 2017-10-11 PROBLEM — J96.01 ACUTE RESPIRATORY FAILURE WITH HYPOXIA (HCC): Status: ACTIVE | Noted: 2017-10-11

## 2017-10-11 PROBLEM — I10 ACCELERATED HYPERTENSION: Status: ACTIVE | Noted: 2017-10-11

## 2017-10-11 PROBLEM — I42.9 CARDIOMYOPATHY (HCC): Status: ACTIVE | Noted: 2017-10-11

## 2017-10-11 LAB
ALBUMIN SERPL-MCNC: 3.2 G/DL (ref 3.5–5)
ALBUMIN/GLOB SERPL: 0.8 {RATIO} (ref 1.1–2.2)
ALP SERPL-CCNC: 115 U/L (ref 45–117)
ALT SERPL-CCNC: 25 U/L (ref 12–78)
AMPHET UR QL SCN: NEGATIVE
ANION GAP SERPL CALC-SCNC: 10 MMOL/L (ref 5–15)
APPEARANCE UR: CLEAR
ARTERIAL PATENCY WRIST A: ABNORMAL
AST SERPL-CCNC: 18 U/L (ref 15–37)
ATRIAL RATE: 119 BPM
BARBITURATES UR QL SCN: NEGATIVE
BASE EXCESS BLDA CALC-SCNC: 1.4 MMOL/L
BASOPHILS # BLD: 0.1 K/UL (ref 0–0.1)
BASOPHILS NFR BLD: 0 % (ref 0–1)
BDY SITE: ABNORMAL
BENZODIAZ UR QL: NEGATIVE
BILIRUB SERPL-MCNC: 0.3 MG/DL (ref 0.2–1)
BILIRUB UR QL: NEGATIVE
BNP SERPL-MCNC: 557 PG/ML (ref 0–125)
BUN SERPL-MCNC: 13 MG/DL (ref 6–20)
BUN/CREAT SERPL: 18 (ref 12–20)
CALCIUM SERPL-MCNC: 9 MG/DL (ref 8.5–10.1)
CALCULATED P AXIS, ECG09: 6 DEGREES
CALCULATED R AXIS, ECG10: 24 DEGREES
CALCULATED T AXIS, ECG11: -14 DEGREES
CANNABINOIDS UR QL SCN: POSITIVE
CHLORIDE SERPL-SCNC: 97 MMOL/L (ref 97–108)
CK MB CFR SERPL CALC: 3.6 % (ref 0–2.5)
CK MB SERPL-MCNC: 2.7 NG/ML (ref 5–25)
CK SERPL-CCNC: 74 U/L (ref 39–308)
CK SERPL-CCNC: 83 U/L (ref 39–308)
CO2 SERPL-SCNC: 28 MMOL/L (ref 21–32)
COCAINE UR QL SCN: NEGATIVE
COLOR UR: ABNORMAL
CREAT SERPL-MCNC: 0.71 MG/DL (ref 0.7–1.3)
DIAGNOSIS, 93000: NORMAL
DRUG SCRN COMMENT,DRGCM: ABNORMAL
EOSINOPHIL # BLD: 0.7 K/UL (ref 0–0.4)
EOSINOPHIL NFR BLD: 5 % (ref 0–7)
ERYTHROCYTE [DISTWIDTH] IN BLOOD BY AUTOMATED COUNT: 14.2 % (ref 11.5–14.5)
GLOBULIN SER CALC-MCNC: 4.1 G/DL (ref 2–4)
GLUCOSE SERPL-MCNC: 143 MG/DL (ref 65–100)
GLUCOSE UR STRIP.AUTO-MCNC: NEGATIVE MG/DL
HCO3 BLDA-SCNC: 27 MMOL/L (ref 22–26)
HCT VFR BLD AUTO: 32.2 % (ref 36.6–50.3)
HGB BLD-MCNC: 10.6 G/DL (ref 12.1–17)
HGB UR QL STRIP: NEGATIVE
KETONES UR QL STRIP.AUTO: 15 MG/DL
LACTATE SERPL-SCNC: 1.5 MMOL/L (ref 0.4–2)
LACTATE SERPL-SCNC: 2.4 MMOL/L (ref 0.4–2)
LEUKOCYTE ESTERASE UR QL STRIP.AUTO: NEGATIVE
LYMPHOCYTES # BLD: 1.4 K/UL (ref 0.8–3.5)
LYMPHOCYTES NFR BLD: 10 % (ref 12–49)
MAGNESIUM SERPL-MCNC: 2.1 MG/DL (ref 1.6–2.4)
MCH RBC QN AUTO: 30.9 PG (ref 26–34)
MCHC RBC AUTO-ENTMCNC: 32.9 G/DL (ref 30–36.5)
MCV RBC AUTO: 93.9 FL (ref 80–99)
METHADONE UR QL: NEGATIVE
MONOCYTES # BLD: 0.9 K/UL (ref 0–1)
MONOCYTES NFR BLD: 6 % (ref 5–13)
NEUTS SEG # BLD: 11.2 K/UL (ref 1.8–8)
NEUTS SEG NFR BLD: 79 % (ref 32–75)
NITRITE UR QL STRIP.AUTO: NEGATIVE
OPIATES UR QL: POSITIVE
P-R INTERVAL, ECG05: 176 MS
PCO2 BLDA: 45 MMHG (ref 35–45)
PCP UR QL: NEGATIVE
PH BLDA: 7.4 [PH] (ref 7.35–7.45)
PH UR STRIP: 6 [PH] (ref 5–8)
PLATELET # BLD AUTO: 653 K/UL (ref 150–400)
PO2 BLDA: 81 MMHG (ref 80–100)
POTASSIUM SERPL-SCNC: 4.1 MMOL/L (ref 3.5–5.1)
PROT SERPL-MCNC: 7.3 G/DL (ref 6.4–8.2)
PROT UR STRIP-MCNC: NEGATIVE MG/DL
Q-T INTERVAL, ECG07: 314 MS
QRS DURATION, ECG06: 96 MS
QTC CALCULATION (BEZET), ECG08: 441 MS
RBC # BLD AUTO: 3.43 M/UL (ref 4.1–5.7)
SAO2 % BLD: 96 % (ref 92–97)
SAO2% DEVICE SAO2% SENSOR NAME: ABNORMAL
SODIUM SERPL-SCNC: 135 MMOL/L (ref 136–145)
SP GR UR REFRACTOMETRY: 1.03 (ref 1–1.03)
SPECIMEN SITE: ABNORMAL
TROPONIN I SERPL-MCNC: 0.08 NG/ML
TROPONIN I SERPL-MCNC: 0.39 NG/ML
TROPONIN I SERPL-MCNC: 0.66 NG/ML
UROBILINOGEN UR QL STRIP.AUTO: 0.2 EU/DL (ref 0.2–1)
VENTRICULAR RATE, ECG03: 119 BPM
WBC # BLD AUTO: 14.2 K/UL (ref 4.1–11.1)

## 2017-10-11 PROCEDURE — 83880 ASSAY OF NATRIURETIC PEPTIDE: CPT | Performed by: EMERGENCY MEDICINE

## 2017-10-11 PROCEDURE — 80307 DRUG TEST PRSMV CHEM ANLYZR: CPT | Performed by: HOSPITALIST

## 2017-10-11 PROCEDURE — 96375 TX/PRO/DX INJ NEW DRUG ADDON: CPT

## 2017-10-11 PROCEDURE — 94640 AIRWAY INHALATION TREATMENT: CPT

## 2017-10-11 PROCEDURE — 96361 HYDRATE IV INFUSION ADD-ON: CPT

## 2017-10-11 PROCEDURE — 83735 ASSAY OF MAGNESIUM: CPT | Performed by: EMERGENCY MEDICINE

## 2017-10-11 PROCEDURE — 93306 TTE W/DOPPLER COMPLETE: CPT

## 2017-10-11 PROCEDURE — 85025 COMPLETE CBC W/AUTO DIFF WBC: CPT | Performed by: EMERGENCY MEDICINE

## 2017-10-11 PROCEDURE — 80053 COMPREHEN METABOLIC PANEL: CPT | Performed by: EMERGENCY MEDICINE

## 2017-10-11 PROCEDURE — 74011000250 HC RX REV CODE- 250: Performed by: HOSPITALIST

## 2017-10-11 PROCEDURE — 93970 EXTREMITY STUDY: CPT

## 2017-10-11 PROCEDURE — 74011250637 HC RX REV CODE- 250/637: Performed by: INTERNAL MEDICINE

## 2017-10-11 PROCEDURE — 82803 BLOOD GASES ANY COMBINATION: CPT | Performed by: EMERGENCY MEDICINE

## 2017-10-11 PROCEDURE — 87040 BLOOD CULTURE FOR BACTERIA: CPT | Performed by: EMERGENCY MEDICINE

## 2017-10-11 PROCEDURE — 74011636320 HC RX REV CODE- 636/320: Performed by: EMERGENCY MEDICINE

## 2017-10-11 PROCEDURE — 82550 ASSAY OF CK (CPK): CPT | Performed by: EMERGENCY MEDICINE

## 2017-10-11 PROCEDURE — 77030013033 HC MSK BPAP/CPAP MMKA -B

## 2017-10-11 PROCEDURE — 96365 THER/PROPH/DIAG IV INF INIT: CPT

## 2017-10-11 PROCEDURE — 83605 ASSAY OF LACTIC ACID: CPT | Performed by: EMERGENCY MEDICINE

## 2017-10-11 PROCEDURE — 74011250636 HC RX REV CODE- 250/636: Performed by: EMERGENCY MEDICINE

## 2017-10-11 PROCEDURE — 74011250636 HC RX REV CODE- 250/636: Performed by: HOSPITALIST

## 2017-10-11 PROCEDURE — 71275 CT ANGIOGRAPHY CHEST: CPT

## 2017-10-11 PROCEDURE — 84484 ASSAY OF TROPONIN QUANT: CPT | Performed by: EMERGENCY MEDICINE

## 2017-10-11 PROCEDURE — 94660 CPAP INITIATION&MGMT: CPT

## 2017-10-11 PROCEDURE — 74011250637 HC RX REV CODE- 250/637: Performed by: HOSPITALIST

## 2017-10-11 PROCEDURE — 74011000250 HC RX REV CODE- 250: Performed by: EMERGENCY MEDICINE

## 2017-10-11 PROCEDURE — 65660000000 HC RM CCU STEPDOWN

## 2017-10-11 PROCEDURE — 74011250637 HC RX REV CODE- 250/637: Performed by: EMERGENCY MEDICINE

## 2017-10-11 PROCEDURE — 81003 URINALYSIS AUTO W/O SCOPE: CPT | Performed by: EMERGENCY MEDICINE

## 2017-10-11 PROCEDURE — 36415 COLL VENOUS BLD VENIPUNCTURE: CPT | Performed by: EMERGENCY MEDICINE

## 2017-10-11 PROCEDURE — 74011000258 HC RX REV CODE- 258: Performed by: EMERGENCY MEDICINE

## 2017-10-11 PROCEDURE — 36600 WITHDRAWAL OF ARTERIAL BLOOD: CPT | Performed by: EMERGENCY MEDICINE

## 2017-10-11 PROCEDURE — 77030005538 HC CATH URETH FOL44 BARD -B

## 2017-10-11 PROCEDURE — 77030029684 HC NEB SM VOL KT MONA -A

## 2017-10-11 PROCEDURE — 99285 EMERGENCY DEPT VISIT HI MDM: CPT

## 2017-10-11 PROCEDURE — 93005 ELECTROCARDIOGRAM TRACING: CPT

## 2017-10-11 PROCEDURE — 71010 XR CHEST PORT: CPT

## 2017-10-11 PROCEDURE — 74011250636 HC RX REV CODE- 250/636: Performed by: INTERNAL MEDICINE

## 2017-10-11 RX ORDER — VALSARTAN 160 MG/1
80 TABLET ORAL 2 TIMES DAILY
COMMUNITY
End: 2018-07-31

## 2017-10-11 RX ORDER — FUROSEMIDE 10 MG/ML
40 INJECTION INTRAMUSCULAR; INTRAVENOUS DAILY
Status: DISCONTINUED | OUTPATIENT
Start: 2017-10-12 | End: 2017-10-12

## 2017-10-11 RX ORDER — FUROSEMIDE 10 MG/ML
60 INJECTION INTRAMUSCULAR; INTRAVENOUS ONCE
Status: COMPLETED | OUTPATIENT
Start: 2017-10-11 | End: 2017-10-11

## 2017-10-11 RX ORDER — ONDANSETRON 2 MG/ML
4 INJECTION INTRAMUSCULAR; INTRAVENOUS
Status: DISCONTINUED | OUTPATIENT
Start: 2017-10-11 | End: 2017-10-13 | Stop reason: HOSPADM

## 2017-10-11 RX ORDER — LEVOFLOXACIN 5 MG/ML
750 INJECTION, SOLUTION INTRAVENOUS
Status: COMPLETED | OUTPATIENT
Start: 2017-10-11 | End: 2017-10-11

## 2017-10-11 RX ORDER — ASPIRIN 325 MG
325 TABLET ORAL DAILY
Status: DISCONTINUED | OUTPATIENT
Start: 2017-10-12 | End: 2017-10-13 | Stop reason: HOSPADM

## 2017-10-11 RX ORDER — SODIUM CHLORIDE 0.9 % (FLUSH) 0.9 %
5-10 SYRINGE (ML) INJECTION EVERY 8 HOURS
Status: DISCONTINUED | OUTPATIENT
Start: 2017-10-11 | End: 2017-10-13 | Stop reason: HOSPADM

## 2017-10-11 RX ORDER — SODIUM CHLORIDE 0.9 % (FLUSH) 0.9 %
10 SYRINGE (ML) INJECTION
Status: COMPLETED | OUTPATIENT
Start: 2017-10-11 | End: 2017-10-11

## 2017-10-11 RX ORDER — DOCUSATE SODIUM 100 MG/1
100 CAPSULE, LIQUID FILLED ORAL 2 TIMES DAILY
Status: DISCONTINUED | OUTPATIENT
Start: 2017-10-11 | End: 2017-10-13 | Stop reason: HOSPADM

## 2017-10-11 RX ORDER — ONDANSETRON 2 MG/ML
4 INJECTION INTRAMUSCULAR; INTRAVENOUS
Status: COMPLETED | OUTPATIENT
Start: 2017-10-11 | End: 2017-10-11

## 2017-10-11 RX ORDER — SODIUM CHLORIDE 9 MG/ML
50 INJECTION, SOLUTION INTRAVENOUS
Status: COMPLETED | OUTPATIENT
Start: 2017-10-11 | End: 2017-10-11

## 2017-10-11 RX ORDER — ALLOPURINOL 100 MG/1
300 TABLET ORAL DAILY
Status: DISCONTINUED | OUTPATIENT
Start: 2017-10-11 | End: 2017-10-13 | Stop reason: HOSPADM

## 2017-10-11 RX ORDER — SODIUM CHLORIDE 0.9 % (FLUSH) 0.9 %
5-10 SYRINGE (ML) INJECTION AS NEEDED
Status: DISCONTINUED | OUTPATIENT
Start: 2017-10-11 | End: 2017-10-13 | Stop reason: HOSPADM

## 2017-10-11 RX ORDER — FUROSEMIDE 10 MG/ML
40 INJECTION INTRAMUSCULAR; INTRAVENOUS ONCE
Status: COMPLETED | OUTPATIENT
Start: 2017-10-11 | End: 2017-10-11

## 2017-10-11 RX ORDER — VALSARTAN 80 MG/1
160 TABLET ORAL DAILY
Status: DISCONTINUED | OUTPATIENT
Start: 2017-10-11 | End: 2017-10-13 | Stop reason: HOSPADM

## 2017-10-11 RX ORDER — ACETAMINOPHEN 325 MG/1
650 TABLET ORAL
Status: DISCONTINUED | OUTPATIENT
Start: 2017-10-11 | End: 2017-10-13 | Stop reason: HOSPADM

## 2017-10-11 RX ORDER — VALSARTAN 160 MG/1
320 TABLET ORAL DAILY
Status: DISCONTINUED | OUTPATIENT
Start: 2017-10-11 | End: 2017-10-11

## 2017-10-11 RX ORDER — IPRATROPIUM BROMIDE AND ALBUTEROL SULFATE 2.5; .5 MG/3ML; MG/3ML
3 SOLUTION RESPIRATORY (INHALATION)
Status: DISCONTINUED | OUTPATIENT
Start: 2017-10-11 | End: 2017-10-12

## 2017-10-11 RX ORDER — LORAZEPAM 2 MG/ML
1 INJECTION INTRAMUSCULAR
Status: DISCONTINUED | OUTPATIENT
Start: 2017-10-11 | End: 2017-10-13 | Stop reason: HOSPADM

## 2017-10-11 RX ORDER — CITALOPRAM 20 MG/1
40 TABLET, FILM COATED ORAL DAILY
Status: DISCONTINUED | OUTPATIENT
Start: 2017-10-11 | End: 2017-10-13 | Stop reason: HOSPADM

## 2017-10-11 RX ORDER — LORAZEPAM 2 MG/ML
1 INJECTION INTRAMUSCULAR
Status: DISCONTINUED | OUTPATIENT
Start: 2017-10-11 | End: 2017-10-11

## 2017-10-11 RX ORDER — GUAIFENESIN 100 MG/5ML
324 LIQUID (ML) ORAL
Status: COMPLETED | OUTPATIENT
Start: 2017-10-11 | End: 2017-10-11

## 2017-10-11 RX ORDER — FUROSEMIDE 10 MG/ML
20 INJECTION INTRAMUSCULAR; INTRAVENOUS
Status: COMPLETED | OUTPATIENT
Start: 2017-10-11 | End: 2017-10-11

## 2017-10-11 RX ORDER — PANTOPRAZOLE SODIUM 20 MG/1
20 TABLET, DELAYED RELEASE ORAL
Status: DISCONTINUED | OUTPATIENT
Start: 2017-10-11 | End: 2017-10-11

## 2017-10-11 RX ORDER — PREDNISONE 20 MG/1
20 TABLET ORAL
COMMUNITY
End: 2017-12-05 | Stop reason: ALTCHOICE

## 2017-10-11 RX ORDER — ATORVASTATIN CALCIUM 20 MG/1
20 TABLET, FILM COATED ORAL DAILY
Status: DISCONTINUED | OUTPATIENT
Start: 2017-10-11 | End: 2017-10-13 | Stop reason: HOSPADM

## 2017-10-11 RX ORDER — KETOCONAZOLE 20 MG/ML
SHAMPOO TOPICAL DAILY PRN
COMMUNITY
End: 2022-08-27

## 2017-10-11 RX ORDER — AMOXICILLIN 250 MG
1 CAPSULE ORAL 2 TIMES DAILY
Status: DISCONTINUED | OUTPATIENT
Start: 2017-10-11 | End: 2017-10-11

## 2017-10-11 RX ORDER — OXYCODONE HYDROCHLORIDE 5 MG/1
5-10 TABLET ORAL
Status: DISCONTINUED | OUTPATIENT
Start: 2017-10-11 | End: 2017-10-13 | Stop reason: HOSPADM

## 2017-10-11 RX ORDER — MORPHINE SULFATE 2 MG/ML
1 INJECTION, SOLUTION INTRAMUSCULAR; INTRAVENOUS
Status: DISCONTINUED | OUTPATIENT
Start: 2017-10-11 | End: 2017-10-13 | Stop reason: HOSPADM

## 2017-10-11 RX ORDER — OXYCODONE AND ACETAMINOPHEN 10; 325 MG/1; MG/1
1 TABLET ORAL
COMMUNITY

## 2017-10-11 RX ORDER — LIDOCAINE HYDROCHLORIDE 20 MG/ML
10 SOLUTION OROPHARYNGEAL
Status: COMPLETED | OUTPATIENT
Start: 2017-10-11 | End: 2017-10-11

## 2017-10-11 RX ORDER — FLUTICASONE FUROATE AND VILANTEROL 100; 25 UG/1; UG/1
1 POWDER RESPIRATORY (INHALATION) DAILY
Status: DISCONTINUED | OUTPATIENT
Start: 2017-10-11 | End: 2017-10-13 | Stop reason: HOSPADM

## 2017-10-11 RX ORDER — IPRATROPIUM BROMIDE AND ALBUTEROL SULFATE 2.5; .5 MG/3ML; MG/3ML
3 SOLUTION RESPIRATORY (INHALATION)
Status: COMPLETED | OUTPATIENT
Start: 2017-10-11 | End: 2017-10-11

## 2017-10-11 RX ORDER — PANTOPRAZOLE SODIUM 40 MG/1
40 TABLET, DELAYED RELEASE ORAL
Status: DISCONTINUED | OUTPATIENT
Start: 2017-10-11 | End: 2017-10-13 | Stop reason: HOSPADM

## 2017-10-11 RX ORDER — ENOXAPARIN SODIUM 100 MG/ML
40 INJECTION SUBCUTANEOUS EVERY 24 HOURS
Status: DISCONTINUED | OUTPATIENT
Start: 2017-10-11 | End: 2017-10-13

## 2017-10-11 RX ADMIN — IPRATROPIUM BROMIDE AND ALBUTEROL SULFATE 3 ML: .5; 3 SOLUTION RESPIRATORY (INHALATION) at 19:30

## 2017-10-11 RX ADMIN — ALUMINUM HYDROXIDE AND MAGNESIUM HYDROXIDE 30 ML: 200; 200 SUSPENSION ORAL at 08:09

## 2017-10-11 RX ADMIN — LORAZEPAM 1 MG: 2 INJECTION INTRAMUSCULAR; INTRAVENOUS at 11:29

## 2017-10-11 RX ADMIN — ASPIRIN 81 MG 324 MG: 81 TABLET ORAL at 10:31

## 2017-10-11 RX ADMIN — NITROGLYCERIN 1 INCH: 20 OINTMENT TOPICAL at 19:13

## 2017-10-11 RX ADMIN — ATORVASTATIN CALCIUM 20 MG: 20 TABLET, FILM COATED ORAL at 19:12

## 2017-10-11 RX ADMIN — FUROSEMIDE 20 MG: 10 INJECTION, SOLUTION INTRAMUSCULAR; INTRAVENOUS at 09:39

## 2017-10-11 RX ADMIN — VALSARTAN 160 MG: 160 TABLET ORAL at 13:16

## 2017-10-11 RX ADMIN — ONDANSETRON 4 MG: 2 INJECTION INTRAMUSCULAR; INTRAVENOUS at 08:10

## 2017-10-11 RX ADMIN — IPRATROPIUM BROMIDE AND ALBUTEROL SULFATE 3 ML: .5; 3 SOLUTION RESPIRATORY (INHALATION) at 23:49

## 2017-10-11 RX ADMIN — SODIUM CHLORIDE 50 ML/HR: 900 INJECTION, SOLUTION INTRAVENOUS at 08:39

## 2017-10-11 RX ADMIN — METHYLPREDNISOLONE SODIUM SUCCINATE 125 MG: 125 INJECTION, POWDER, FOR SOLUTION INTRAMUSCULAR; INTRAVENOUS at 07:48

## 2017-10-11 RX ADMIN — Medication 10 ML: at 22:13

## 2017-10-11 RX ADMIN — ENOXAPARIN SODIUM 40 MG: 40 INJECTION SUBCUTANEOUS at 10:34

## 2017-10-11 RX ADMIN — IPRATROPIUM BROMIDE AND ALBUTEROL SULFATE 3 ML: .5; 3 SOLUTION RESPIRATORY (INHALATION) at 08:14

## 2017-10-11 RX ADMIN — PANTOPRAZOLE SODIUM 40 MG: 40 TABLET, DELAYED RELEASE ORAL at 12:00

## 2017-10-11 RX ADMIN — IPRATROPIUM BROMIDE AND ALBUTEROL SULFATE 3 ML: .5; 3 SOLUTION RESPIRATORY (INHALATION) at 15:34

## 2017-10-11 RX ADMIN — FUROSEMIDE 40 MG: 10 INJECTION, SOLUTION INTRAMUSCULAR; INTRAVENOUS at 19:12

## 2017-10-11 RX ADMIN — FUROSEMIDE 60 MG: 10 INJECTION, SOLUTION INTRAMUSCULAR; INTRAVENOUS at 10:37

## 2017-10-11 RX ADMIN — BUSPIRONE HYDROCHLORIDE 15 MG: 10 TABLET ORAL at 16:42

## 2017-10-11 RX ADMIN — NITROGLYCERIN 1 INCH: 20 OINTMENT TOPICAL at 23:56

## 2017-10-11 RX ADMIN — NITROGLYCERIN 1 INCH: 20 OINTMENT TOPICAL at 11:32

## 2017-10-11 RX ADMIN — ALLOPURINOL 300 MG: 300 TABLET ORAL at 19:12

## 2017-10-11 RX ADMIN — METHYLPREDNISOLONE SODIUM SUCCINATE 40 MG: 40 INJECTION, POWDER, FOR SOLUTION INTRAMUSCULAR; INTRAVENOUS at 16:44

## 2017-10-11 RX ADMIN — OXYCODONE HYDROCHLORIDE 10 MG: 5 TABLET ORAL at 18:28

## 2017-10-11 RX ADMIN — IPRATROPIUM BROMIDE AND ALBUTEROL SULFATE 3 ML: .5; 3 SOLUTION RESPIRATORY (INHALATION) at 13:40

## 2017-10-11 RX ADMIN — IOPAMIDOL 100 ML: 755 INJECTION, SOLUTION INTRAVENOUS at 08:35

## 2017-10-11 RX ADMIN — LIDOCAINE HYDROCHLORIDE 10 ML: 20 SOLUTION ORAL; TOPICAL at 08:09

## 2017-10-11 RX ADMIN — Medication 10 ML: at 11:35

## 2017-10-11 RX ADMIN — IPRATROPIUM BROMIDE AND ALBUTEROL SULFATE 3 ML: .5; 3 SOLUTION RESPIRATORY (INHALATION) at 07:44

## 2017-10-11 RX ADMIN — Medication 10 ML: at 08:35

## 2017-10-11 RX ADMIN — Medication 10 ML: at 08:39

## 2017-10-11 RX ADMIN — BUSPIRONE HYDROCHLORIDE 15 MG: 10 TABLET ORAL at 22:12

## 2017-10-11 RX ADMIN — LEVOFLOXACIN 750 MG: 5 INJECTION, SOLUTION INTRAVENOUS at 09:42

## 2017-10-11 RX ADMIN — CITALOPRAM HYDROBROMIDE 40 MG: 20 TABLET ORAL at 19:12

## 2017-10-11 RX ADMIN — CEFEPIME HYDROCHLORIDE 2 G: 2 INJECTION, POWDER, FOR SOLUTION INTRAVENOUS at 08:54

## 2017-10-11 RX ADMIN — SODIUM CHLORIDE 1000 ML: 900 INJECTION, SOLUTION INTRAVENOUS at 08:18

## 2017-10-11 RX ADMIN — MORPHINE SULFATE 1 MG: 2 INJECTION, SOLUTION INTRAMUSCULAR; INTRAVENOUS at 01:00

## 2017-10-11 RX ADMIN — METHYLPREDNISOLONE SODIUM SUCCINATE 40 MG: 40 INJECTION, POWDER, FOR SOLUTION INTRAMUSCULAR; INTRAVENOUS at 22:12

## 2017-10-11 RX ADMIN — OXYCODONE HYDROCHLORIDE 10 MG: 5 TABLET ORAL at 13:24

## 2017-10-11 NOTE — ED TRIAGE NOTES
Case discussed with Dr Claudette Bugler; pt meets criteria for code purple. Will initiate orders and anticipate 1 L NS, will send LA.

## 2017-10-11 NOTE — IP AVS SNAPSHOT
[address] [facility] 
[phone] Patient: Nataly Keita MRN: [id number] :1950 Current Discharge Medication List  
  
START taking these medications Dose & Instructions Dispensing Information Comments Morning Noon Evening Bedtime  
 atorvastatin 20 mg tablet Commonly known as:  LIPITOR Your last dose was: Your next dose is:    
   
   
 Dose:  20 mg Take 1 Tab by mouth daily. Refills by Dr. David West Quantity:  30 Tab Refills:  0  
     
   
   
   
  
 carvedilol 3.125 mg tablet Commonly known as:  Jinx Re Your last dose was: Your next dose is:    
   
   
 Dose:  3.125 mg Take 1 Tab by mouth two (2) times daily (with meals). Refills by  Dr. Kelly Stinson Quantity:  60 Tab Refills:  0  
     
   
   
   
  
 furosemide 40 mg tablet Commonly known as:  LASIX Your last dose was: Your next dose is:    
   
   
 1 pill BID  Refills by Dr. Kelly Stinson  Indications: Pulmonary Edema due to Chronic Heart Failure Quantity:  60 Tab Refills:  1  
     
   
   
   
  
 pantoprazole 40 mg tablet Commonly known as:  PROTONIX Your last dose was: Your next dose is:    
   
   
 Dose:  40 mg Take 1 Tab by mouth Daily (before breakfast). Quantity:  30 Tab Refills:  0  
     
   
   
   
  
 potassium chloride 20 mEq tablet Commonly known as:  K-DUR, KLOR-CON Your last dose was: Your next dose is:    
   
   
 Dose:  40 mEq Take 2 Tabs by mouth two (2) times a day. Quantity:  60 Tab Refills:  0 Refills by Dr. Kelly Stinson CONTINUE these medications which have CHANGED Dose & Instructions Dispensing Information Comments Morning Noon Evening Bedtime  
 valsartan 160 mg tablet Commonly known as:  DIOVAN What changed:  Another medication with the same name was removed.  Continue taking this medication, and follow the directions you see here. Your last dose was: Your next dose is:    
   
   
 Dose:  160 mg Take 160 mg by mouth daily. Refills:  0 CONTINUE these medications which have NOT CHANGED Dose & Instructions Dispensing Information Comments Morning Noon Evening Bedtime  
 albuterol 90 mcg/actuation inhaler Commonly known as:  PROVENTIL HFA, VENTOLIN HFA, PROAIR HFA Your last dose was: Your next dose is:    
   
   
 Dose:  2 Puff Take 2 Puffs by inhalation daily as needed for Wheezing or Shortness of Breath. Refills:  0  
     
   
   
   
  
 allopurinol 300 mg tablet Commonly known as:  Emma Dinesh Your last dose was: Your next dose is:    
   
   
 Dose:  300 mg Take 300 mg by mouth daily. Refills:  0  
     
   
   
   
  
 aspirin delayed-release 325 mg tablet Your last dose was: Your next dose is:    
   
   
 Dose:  325 mg Take 1 Tab by mouth two (2) times a day for 30 days. Quantity:  60 Tab Refills:  0  
     
   
   
   
  
 budesonide-formoterol 160-4.5 mcg/actuation Hfaa Commonly known as:  SYMBICORT Your last dose was: Your next dose is:    
   
   
 Dose:  2 Puff Take 2 Puffs by inhalation two (2) times daily as needed (Shortness of Breath). Refills:  0  
     
   
   
   
  
 busPIRone 15 mg tablet Commonly known as:  BUSPAR Your last dose was: Your next dose is:    
   
   
 Dose:  15 mg Take 15 mg by mouth three (3) times daily. Refills:  0  
     
   
   
   
  
 citalopram 40 mg tablet Commonly known as:  Thea Letters Your last dose was: Your next dose is:    
   
   
 Dose:  40 mg Take 40 mg by mouth daily. Refills:  0 CLARITIN 10 mg tablet Generic drug:  loratadine Your last dose was: Your next dose is:    
   
   
 Dose:  10 mg Take 10 mg by mouth. Refills:  0  
     
   
   
   
  
 diclofenac EC 75 mg EC tablet Commonly known as:  VOLTAREN Your last dose was: Your next dose is:    
   
   
 Dose:  75 mg Take 75 mg by mouth two (2) times a day. Refills:  0  
     
   
   
   
  
 ketoconazole 2 % shampoo Commonly known as:  NIZORAL Your last dose was: Your next dose is:    
   
   
 Apply  to affected area daily as needed for Itching. Apply to scalp and face up to three times a week as needed for flaky skin Refills:  0  
     
   
   
   
  
 omeprazole 20 mg capsule Commonly known as:  PRILOSEC Your last dose was: Your next dose is:    
   
   
 Dose:  20 mg Take 20 mg by mouth daily. Refills:  0  
     
   
   
   
  
 oxyCODONE-acetaminophen  mg per tablet Commonly known as:  PERCOCET 10 Your last dose was: Your next dose is:    
   
   
 Dose:  1 Tab Take 1 Tab by mouth three (3) times daily as needed for Pain (Back pain). Refills:  0  
     
   
   
   
  
 predniSONE 20 mg tablet Commonly known as:  Aldo Razor Your last dose was: Your next dose is:    
   
   
 Dose:  20 mg Take 20 mg by mouth daily (with breakfast). Refills:  0  
     
   
   
   
  
 testosterone cypionate 200 mg/mL injection Commonly known as:  DEPOTESTOTERONE CYPIONATE Your last dose was: Your next dose is:    
   
   
 Dose:  200 mg  
200 mg by IntraMUSCular route every fourteen (14) days. Quantity:  0 Vial  
Refills:  0  
     
   
   
   
  
 tiotropium 18 mcg inhalation capsule Commonly known as:  Leonardo Houston Your last dose was: Your next dose is:    
   
   
 Dose:  1 Cap Take 1 Cap by inhalation daily. Refills:  0 Where to Get Your Medications Information on where to get these meds will be given to you by the nurse or doctor. ! Ask your nurse or doctor about these medications atorvastatin 20 mg tablet  
 carvedilol 3.125 mg tablet  
 furosemide 40 mg tablet  
 pantoprazole 40 mg tablet  
 potassium chloride 20 mEq tablet

## 2017-10-11 NOTE — PROGRESS NOTES
Pharmacy Automatic Renal Dosing Protocol - Antimicrobials    Indication for Antimicrobials: HAP     Current Regimen of Each Antimicrobial:  Vancomycin 2250 mg IV load followed by 1000 mg IV every 8 hours (Start Date 10/11; Day # 1)  Cefepime 2 g IV x 1 dose   Levofloxacin 750 mg IV x 1 dose       Goal Level: VANCOMYCIN TROUGH GOAL RANGE  Vancomycin Trough: 15 - 20 mcg/mL  Measured / Extrapolated Vancomycin Level: None    Significant Cultures:   None yet    Paralysis, amputations, malnutrition: None documented     Labs:  Recent Labs      10/11/17   0743   CREA  0.71   BUN  13   WBC  14.2*     Temp (24hrs), Av.5 °F (36.4 °C), Min:97.5 °F (36.4 °C), Max:97.5 °F (36.4 °C)    No results found for: PCT    Creatinine Clearance (mL/min) or Dialysis: 98 mL/min     Impression/Plan: Will order vancomycin 2250 gm IV load followed by 1000 mg IV every 8 hours for an estimated trough of 16.4 mcg/mL. One time doses of cefepime and levofloxacin were ordered for the ED, no admission antibiotics have yet been ordered. Pharmacy will follow daily and adjust medications as appropriate for renal function and/or serum levels.     Thank you,  Mac Lira, CHOCOD

## 2017-10-11 NOTE — ED NOTES
Pt assisted to sit at side of bed to void x2, assisted by wife as he refused help from nursing. Voided 350cc clear yellow urine.

## 2017-10-11 NOTE — PROGRESS NOTES
[facility] Consult Service Note  Pulmonary, Critical Care, and Sleep Medicine    Name: Yessy Mandujano MRN: [id number]   : 1950 Hospital: [address]   Date: 10/11/2017   Hospital Day: 1       Subjective/Interval History:   I have reviewed the flowsheet and previous days notes. Seen earlier today on rounds. IMPRESSION:   1. Acute on chronic respiratory failure with hypoxia, POA; O2 at bedside  2. Severe COPD FEV1 1.07 Liters (33%) followed by Dr. Carolanne Olszewski but obvious sign of acute exacerbation but has no reserve; not steroid dependent; This year went to [facility] for stem cells x 2  3. Acute pulmonary edema with new onset chf, POA CT chest interstitial edema and peripheral edema  4. Accelerated hypertension, report HCTZ was discontinued prior to hip surgery due to low sodium. 5. Elevated troponin 0.08; worrisome for NSTEMI, POA- agree with cardiology evaluation  6. Weight loss about 40 pounds this past year- may have helped his breathing as much as his stem cell treatments and inhalers    7. Generalized anxiety disorder  8. S/p left THR on 17. Dr. Valente Moss  9. Chronic marijuana abuse      RECOMMENDATIONS/PLAN:   1. Agree with BIPAP but may try to wean since he has responded to diuretics thus far  2. Lower extremity venous dopplers- even though CTA negative for PE, still at risk for DVT and legs may be swollen from DVT or CHF or surgery  3. Monitor I/o, daily weight. 4. Echo ordered-  5. Dr. Lupe Shaffer to see  6. Serial cardiac enzymes pending  7. duoneb q4h,   8.  IV solumedrol with fast taper   9. doxycycline for possible copd exacerbation     Patient PCP: Samina Ramos MD   Pulm:  Dr. Carolanne Olszewski  Surrogate decision maker:  Wife Tanya Le cell [phone]      Code: Full Code      Risk of deterioration: medium and high   [x] High complexity decision making was performed    Subjective/Initial History:   I have reviewed the flowsheet and previous days notes. I was asked by Jenelle Lozano MD to see Regan Necessary in consultation for a chief complaint of respiratory failure and COPD . Regan Necessary is a 61 y.o.  male with severe COPD on 2L O2 at night x 2 years, s/p left hip replacement surgery on 9/21 who presents with respiratory distress. History from wife and patient. Did well after surgery, has stable left leg swelling since surgery. Over past weekend, noticed onset of mild SOB and wheezing and called PCP who started him on prednisone (he took first dose this AM). Two days ago, developed swelling in right lower leg. Today woke up around 6am with SOB that became much worse after walking to bathroom, had to sit for several minutes on commode to try and catch breath, walked back to bed and asked wife to call EMS. Has developed mild cough today, nonproductive. No fever or chills. SOB worse with exertion or even talking. Better at rest, associated with chest tightness 8/10 across chest, +nausea. In ER, CXR with new diffuse pulmonary edema. CTA chest negative PE but has changes suggestive of CHF I.e new bilateral interstitial and alveolar opacities. Denies hx of chf or MI. Did not have stress test prior to surgery. Per wife, pt had left total hip arthroplasty (9/21/17), and has had mild swelling over site of procedure, but has otherwise been healing well; she states pt had follow up with Dr. Nidhi Dominguez ~1 week ago. Pt's wife notes pt has had left lower leg swelling since procedure, which she states is slightly better than baseline this morning, as pt wore compression socks overnight. Pt sees Dr. Emilie Hdz and last visit August 2017. She states pt is not currently taking anticoagulants. Per wife, pt has never been admitted for hx of COPD, and has never been intubated. Not on chronic steroids. She denies pt has hx of DVT/PE. Pt is on 2 L O2 NC at night at baseline.      Pt placed on BIPAP after returning from Ct scan for increased WOB. Feeling better after dose of lasix, morphine and some time on BIPAP. D/w Dr. Robert Otto. Allergies   Allergen Reactions    Adhesive Tape-Silicones Other (comments)     Pulls skin off    Sulfa (Sulfonamide Antibiotics) Shortness of Breath        MAR reviewed and pertinent medications noted or modified as needed     Current Facility-Administered Medications   Medication    sodium chloride (NS) flush 5-10 mL    nitroglycerin (NITROBID) 2 % ointment 1 Inch    sodium chloride (NS) flush 5-10 mL    sodium chloride (NS) flush 5-10 mL    acetaminophen (TYLENOL) tablet 650 mg    ondansetron (ZOFRAN) injection 4 mg    docusate sodium (COLACE) capsule 100 mg    enoxaparin (LOVENOX) injection 40 mg    albuterol-ipratropium (DUO-NEB) 2.5 MG-0.5 MG/3 ML    [START ON 10/12/2017] furosemide (LASIX) injection 40 mg    [START ON 10/12/2017] aspirin (ASPIRIN) tablet 325 mg    oxyCODONE IR (ROXICODONE) tablet 5-10 mg    allopurinol (ZYLOPRIM) tablet 300 mg    fluticasone-vilanterol (BREO ELLIPTA) 100mcg-25mcg/puff    busPIRone (BUSPAR) tablet 15 mg    citalopram (CELEXA) tablet 40 mg    umeclidinium (INCRUSE ELLIPTA) 62.5 mcg/actuation    [START ON 10/12/2017] doxycycline (VIBRAMYCIN) 100 mg in 0.9% sodium chloride (MBP/ADV) 100 mL    morphine injection 1 mg    LORazepam (ATIVAN) injection 1 mg    methylPREDNISolone (PF) (SOLU-MEDROL) injection 40 mg    pantoprazole (PROTONIX) tablet 40 mg    valsartan (DIOVAN) tablet 160 mg     Current Outpatient Prescriptions   Medication Sig    valsartan (DIOVAN) 160 mg tablet Take 160 mg by mouth daily.  predniSONE (DELTASONE) 20 mg tablet Take 20 mg by mouth daily (with breakfast).  ketoconazole (NIZORAL) 2 % shampoo Apply  to affected area daily as needed for Itching.  Apply to scalp and face up to three times a week as needed for flaky skin    oxyCODONE-acetaminophen (PERCOCET 10)  mg per tablet Take 1 Tab by mouth three (3) times daily as needed for Pain (Back pain).  loratadine (CLARITIN) 10 mg tablet Take 10 mg by mouth.  diclofenac EC (VOLTAREN) 75 mg EC tablet Take 75 mg by mouth two (2) times a day.  aspirin delayed-release 325 mg tablet Take 1 Tab by mouth two (2) times a day for 30 days.  allopurinol (ZYLOPRIM) 300 mg tablet Take 300 mg by mouth daily.  omeprazole (PRILOSEC) 20 mg capsule Take 20 mg by mouth daily.  busPIRone (BUSPAR) 15 mg tablet Take 15 mg by mouth three (3) times daily.  citalopram (CELEXA) 40 mg tablet Take 40 mg by mouth daily.  budesonide-formoterol (SYMBICORT) 160-4.5 mcg/actuation HFA inhaler Take 2 Puffs by inhalation two (2) times daily as needed (Shortness of Breath).  tiotropium (SPIRIVA) 18 mcg inhalation capsule Take 1 Cap by inhalation daily.  albuterol (PROVENTIL HFA, VENTOLIN HFA, PROAIR HFA) 90 mcg/actuation inhaler Take 2 Puffs by inhalation daily as needed for Wheezing or Shortness of Breath.  testosterone cypionate (DEPOTESTOTERONE CYPIONATE) 200 mg/mL injection 200 mg by IntraMUSCular route every fourteen (14) days. PMH:  has a past medical history of Arthritis; Chronic obstructive pulmonary disease ([address] Chronic pain; GERD (gastroesophageal reflux disease); Hypertension; Ill-defined condition; Psychiatric disorder; and Psychiatric disorder. He also has no past medical history of Adverse effect of anesthesia; Difficult intubation; Malignant hyperthermia due to anesthesia; Nausea & vomiting; or Pseudocholinesterase deficiency. PSH:   has a past surgical history that includes colectomy (2000); tonsillectomy; knee scope,med or lat menis repair (Left); other surgical (Left); and other surgical.   FHX: family history includes Arthritis-osteo in his mother; Cancer in his brother, father, and mother. SHX:  reports that he quit smoking about 24 years ago. He has a 72.50 pack-year smoking history.  He has never used smokeless tobacco. He reports that he does not drink alcohol or use illicit drugs. ROS:A comprehensive review of systems was negative except for that written in the HPI. Telemetry:    normal sinus rhythm    Objective:     Vital Signs: Intake/Output: Intake/Output:   Visit Vitals    /84    Pulse (!) 111    Temp 97.5 °F (36.4 °C)    Resp 14    Ht 5' 7\" (1.702 m)    Wt 90.7 kg (200 lb)    SpO2 97%    BMI 31.32 kg/m2           O2 Device: BIPAP  O2 Flow Rate (L/min): 4 l/min  Wt Readings from Last 4 Encounters:   10/11/17 90.7 kg (200 lb)   17 89.7 kg (197 lb 12 oz)   17 90.1 kg (198 lb 9.6 oz)   16 106.1 kg (233 lb 14.5 oz)       Temp (24hrs), Av.5 °F (36.4 °C), Min:97.5 °F (36.4 °C), Max:97.5 °F (36.4 °C)     Intake/Output Summary (Last 24 hours) at 10/11/17 1432  Last data filed at 10/11/17 1329   Gross per 24 hour   Intake              750 ml   Output             1700 ml   Net             -950 ml     Last shift:      10/11 07 - 10/11 1900  In: 750 [I.V.:750]  Out: 1700 [Urine:1700]  Last 3 shifts:       Exam:    Physical Exam:    General: WM alert, cooperative and severely ill   HEENT: NCAT, BIPAP FFM; No Thrush; class 4 airway   Neck: No abnormally enlarged lymph nodes. Chest: increased AP diameter   Lungs: rales bilaterally and no wheeze. No rhonchi   Heart: Regular rate and rhythm   Abdomen: soft, non-tender, without masses or organomegaly, protuberant, noparadox   :    Extremity: negative, cyanosis, clubbing 2-3+ bilateral lower extremity edema to the level of the knees   Neuro: alert   Psych: Alert and Oriented x 2   Skin: Pallor;    Pulses:Bilateral, Radial, 2+   Capillary refill: normal well perfused,    Data:     Interval lab and diagnostic data was reviewed. Interval radiology images were independently viewed and available reports were reviewed. All lab results for the last 24 hours reviewed.              Labs:    Recent Labs      10/11/17   0743   WBC  14.2*   HGB  10.6*   PLT  653*     Recent Labs 10/11/17   1157  10/11/17   0804  10/11/17   0743   NA   --    --   135*   K   --    --   4.1   CL   --    --   97   CO2   --    --   28   GLU   --    --   143*   BUN   --    --   13   CREA   --    --   0.71   CA   --    --   9.0   MG   --    --   2.1   LAC  1.5  2.4*   --    ALB   --    --   3.2*   SGOT   --    --   18   ALT   --    --   25     Recent Labs      10/11/17   0743   PH  7.40   PCO2  45   PO2  81   HCO3  27*     Recent Labs      10/11/17   1157  10/11/17   0743   CPK  83  74   CKNDX   --   3.6*   TROIQ  0.39*  0.08*     No results found for: BNPP, BNP   Lab Results   Component Value Date/Time    Culture result: MRSA NOT PRESENT 09/12/2017 10:49 AM    Culture result:  09/12/2017 10:49 AM         Screening of patient nares for MRSA is for surveillance purposes and, if positive, to facilitate isolation considerations in high risk settings. It is not intended for automatic decolonization interventions per se as regimens are not sufficiently effective to warrant routine use. Culture result: MRSA NOT PRESENT 02/17/2016 01:36 PM    Culture result:  02/17/2016 01:36 PM         Screening of patient nares for MRSA is for surveillance purposes and, if positive, to facilitate isolation considerations in high risk settings. It is not intended for automatic decolonization interventions per se as regimens are not sufficiently effective to warrant routine use.      Lab Results   Component Value Date/Time    CK 83 10/11/2017 11:57 AM    CK 74 10/11/2017 07:43 AM     Lab Results   Component Value Date/Time    Color YELLOW/STRAW 10/11/2017 10:29 AM    Appearance CLEAR 10/11/2017 10:29 AM    pH (UA) 6.0 10/11/2017 10:29 AM    Protein NEGATIVE  10/11/2017 10:29 AM    Glucose NEGATIVE  10/11/2017 10:29 AM    Ketone 15 10/11/2017 10:29 AM    Bilirubin NEGATIVE  10/11/2017 10:29 AM    Blood NEGATIVE  10/11/2017 10:29 AM    Urobilinogen 0.2 10/11/2017 10:29 AM    Nitrites NEGATIVE  10/11/2017 10:29 AM    Leukocyte Esterase NEGATIVE  10/11/2017 10:29 AM    WBC 0-4 09/12/2017 10:49 AM    RBC 0-5 09/12/2017 10:49 AM    Bacteria NEGATIVE  09/12/2017 10:49 AM         Imaging:  I have personally reviewed the patients radiographs and have reviewed the reports:        Results from Hospital Encounter encounter on 10/11/17   XR CHEST PORT   Narrative EXAM:  XR CHEST PORT    INDICATION: dyspnea, respiratory distress    COMPARISON: Chest x-ray 2/7/2016. TECHNIQUE: Single frontal view of the chest.    FINDINGS:  Heterogeneous bilateral alveolar and interstitial opacities, most  confluent at the right lung base. No evidence of pleural effusion or  pneumothorax. The cardiomediastinal silhouette is normal.  No acute or  aggressive osseous lesion. .           Impression IMPRESSION:   New bilateral interstitial and alveolar opacities. The appearance is most  suspicious for edema superimposed on emphysema. The more confluent area in the  right lung base may represent superimposed pneumonia in the appropriate clinical  setting. Results from [place] encounter on 10/11/17   CTA CHEST W OR W WO CONT   Narrative CT ANGIOGRAPHY CHEST. 10/11/2017 8:34 AM     INDICATION: Dyspnea, respiratory distress, left total hip replacement on  9/22/2017. COMPARISON: None. TECHNIQUE: CT angiography of the chest was performed after the administration of  100 cc IV contrast (Isovue 370). Coronal and sagittal, and coronal MIP  reconstructions were performed. CT dose reduction was achieved through use of a  standardized protocol tailored for this examination and automatic exposure  control for dose modulation. FINDINGS:  There is no pulmonary embolism. Interlobular septal thickening is consistent  with interstitial edema in the setting of trace bilateral pleural effusions. Scattered groundglass opacity indicates minimal alveolar edema. Centrilobular  emphysema is mild to moderate.  Incidental note is made of scarring inferior to  the right sternoclavicular joint in the right upper lobe apical segment. The  left ventricle is mildly enlarged. Mitral annular, aortic valvular, and left  anterior descending coronary calcifications are mild. Reflux of contrast into  into the hepatic veins suggests right heart dysfunction. No thoracic  lymphadenopathy. There is trace endotracheal debris; the central airways are  otherwise patent. Bronchial wall thickening is further evidence of interstitial  edema. No pneumothorax. No thoracic lymphadenopathy. The visualized upper  abdomen is otherwise normal.         Impression IMPRESSION:   1. CHF: Interstitial and minimal alveolar edema, trace bilateral pleural  effusions, left ventricular enlargement. 2. No PE. My assessment/plan was discussed with:  nursing    respiratory therapy Dr. roth      Complex decision making was performed which includes reviewing the patient's past medical records, current laboratory results, medications, ECG and actual Xray films, immediately available at the bedside to the patient    Thank you for allowing us to participate in the care of this patient. We will be happy to follow with you.     Irma Camilo MD

## 2017-10-11 NOTE — ED NOTES
Case discussed with Dr Berta Swanson; CT shows some pulmonary edema. Will stop IVF at this time after 500cc infused.

## 2017-10-11 NOTE — ED PROVIDER NOTES
[facility].  EMERGENCY DEPARTMENT HISTORY AND PHYSICAL EXAM       Date of Service: 10/11/2017   Patient Name: Israel Carlson   YOB: 1950  Medical Record Number: [id number]    History of Presenting Illness     Chief Complaint   Patient presents with    Shortness of Breath     EMS reports onset of SOB 2 hours ago, SX improved with neb enroute. Pt tachycardic, RR30, skin cool and moist after transfer to stretcher. Placed on 4 L NC.  MD to bedside        History Provided By:  Patient, spouse, EMS    Additional History:   Israel Carlson is a 61 y.o. male with PMhx significant for COPD, HTN, and GERD who presents via EMS to the ED with cc of SOB and wheezing since ~0530 today. Per wife, pt has also had non-productive cough x ~2 days, which has become productive since onset of symptoms today. Per EMS, pt has SpO2 87-89% en route, which improved to 100% on duoneb. EMS states pt's symptoms appeared to have improved prior to transfer from stretcher to ED bed, which significantly exacerbated his symptoms. Per wife, pt had left total hip arthroplasty (9/21/17), and has had mild swelling over site of procedure, but has otherwise been healing well; she states pt had follow up with his Orthopedic Surgeon ~1 week ago. Pt's wife notes pt has had left lower leg swelling since procedure, which she states is slightly better than baseline this morning, as pt wore compression socks overnight. She states pt is not currently taking anticoagulants. Per wife, pt has never been admitted for hx of COPD, and has never been intubated. She denies pt has hx of DVT/PE. Pt is on 2 L O2 NC at night at baseline. Pt specifically denies CP, ABD pain, and N/V/D. Social Hx: (Former) Tobacco, - EtOH, - Illicit Drugs    There are no other complaints, changes or physical findings at this time.     Primary Care Provider: Araceli Friedman MD   Orthopedics: Rosemarie Camacho MD    Past History     Past Medical History:   Past Medical History:   Diagnosis Date    Arthritis     Chronic obstructive pulmonary disease (HCC)     Chronic pain     back    GERD (gastroesophageal reflux disease)     Hypertension     Ill-defined condition     Gout    Psychiatric disorder     Generalized Anxiety Disorder    Psychiatric disorder     ETOH abuse        Past Surgical History:   Past Surgical History:   Procedure Laterality Date    HX COLECTOMY  2000    diverticulitis    HX OTHER SURGICAL Left     mastoidectomy and inner ear surgery- age  18   [de-identified] OTHER SURGICAL      investigational stem cell [facility] for [facility]d    HX TONSILLECTOMY      KNEE SCOPE,MED OR LAT MENIS REPAIR Left         Family History:   Family History   Problem Relation Age of Onset    Cancer Mother      lung    Arthritis-osteo Mother     Cancer Father      throat    Cancer Brother      prostate        Social History:   Social History   Substance Use Topics    Smoking status: Former Smoker     Packs/day: 2.50     Years: 29.00     Quit date: 2/17/1993    Smokeless tobacco: Never Used    Alcohol use No      Comment: quit march 15 2017 reports was more than 21 beers  per week         Allergies: Allergies   Allergen Reactions    Adhesive Tape-Silicones Other (comments)     Pulls skin off    Sulfa (Sulfonamide Antibiotics) Shortness of Breath         Review of Systems   Review of Systems   Constitutional: Negative for chills, fatigue and fever. HENT: Negative for congestion, rhinorrhea and sore throat. Eyes: Negative for pain, discharge and visual disturbance. Respiratory: Positive for cough, shortness of breath and wheezing. Negative for chest tightness. Cardiovascular: Negative for chest pain, palpitations and leg swelling. Gastrointestinal: Negative for abdominal pain, constipation, diarrhea, nausea and vomiting. Genitourinary: Negative for dysuria, frequency and hematuria.    Musculoskeletal: Negative for arthralgias, back pain and myalgias. Skin: Negative for rash. Neurological: Negative for dizziness, weakness, light-headedness and headaches. Psychiatric/Behavioral: Negative. Physical Exam  Physical Exam   Constitutional: He is oriented to person, place, and time. He appears well-developed and well-nourished. He appears distressed. HENT:   Head: Normocephalic and atraumatic. Eyes: EOM are normal. Right eye exhibits no discharge. Left eye exhibits no discharge. No scleral icterus. Neck: Normal range of motion. Neck supple. No tracheal deviation present. Cardiovascular: Regular rhythm, normal heart sounds and intact distal pulses. Tachycardia present. Exam reveals no gallop and no friction rub. No murmur heard. Pulmonary/Chest: Tachypnea noted. He is in respiratory distress. He has decreased breath sounds (diffuse). He has no wheezes. He has no rales. Pursed lip breathing   Abdominal: Soft. He exhibits no distension. There is no tenderness. Musculoskeletal: Normal range of motion. 2+ edema left lower leg   Lymphadenopathy:     He has no cervical adenopathy. Neurological: He is alert and oriented to person, place, and time. Skin: Skin is warm. No rash noted. He is diaphoretic. Psychiatric: He has a normal mood and affect. Nursing note and vitals reviewed. Medical Decision Making   I am the first provider for this patient. I reviewed the vital signs, available nursing notes, past medical history, past surgical history, family history and social history. Old Medical Records: Old medical records. Nursing notes.      Provider Notes:   Differential includes COPD exacerbation, pneumonia, bronchitis, URI, pulmonary edema, heart failure, PE  - CBC, CMP, Chad, BNP  - Lactate, blood cultures  - CXR  - CTA to evaluate for PE given recent surgery  - Symptomatic management including duo-nebs, steroids, IVF  - Plan to admit for further management    Critical Care Time:   CRITICAL CARE NOTE :    9:25 AM    IMPENDING DETERIORATION -Respiratory  ASSOCIATED RISK FACTORS - Hypoxia  MANAGEMENT- Bedside Assessment and Supervision of Care  INTERPRETATION -  Blood Gases, ECG and Blood Pressure  INTERVENTIONS - BiPAP, antibiotics, lasix  CASE REVIEW - Hospitalist, Nursing and Family  TREATMENT RESPONSE -Unchanged   PERFORMED BY - Self    I have spent 60 minutes of critical care time involved in lab review, consultations with specialist, family decision- making, bedside attention and documentation. During this entire length of time I was immediately available to the patient . Edwin Borges MD    ED Course:  7:27 AM   Initial assessment performed. The patients presenting problems have been discussed, and they are in agreement with the care plan formulated and outlined with them. I have encouraged them to ask questions as they arise throughout their visit. Progress Notes:     8:17 AM  Pt re-evaluated, respiratory status improved, appears more comfortable. On exam, pt less tachypneic.    8:51 AM  Pt now more tachypneic with pursed lip breathing following CTA. Pt not using breathing treatments as ordered, will place on BiPAP given concern for respiratory fatigue. 9:36 AM  CTA concerning for pulmonary edema; will stop IVF, pt received ~500 cc's. Will defer 30 cc/kg bolus due to concern for pulmonary edema and heart failure, continue antibiotics. CONSULT NOTE:   9:25 AM  Edwin Borges MD spoke with Karan Driver MD,   Specialty: Hospitalist  Discussed pt's hx, disposition, and available diagnostic and imaging results. Reviewed care plans. Consultant will evaluate pt for admission. 2:21 PM - I suspect that this patient has an active infection. 2:21 PM - The patient met criteria for severe sepsis at this time.       PROVIDER SEPSIS PHYSICAL EXAM EVAL  Vital signs reviewed (see nursing documentation for further details):  Vitals:    10/11/17 1330 10/11/17 1340 10/11/17 1401 10/11/17 1535 BP: 133/89  125/84    Pulse: (!) 106  (!) 111    Resp: 15  14    Temp:       SpO2: 97% 98% 97% 98%       Cardiac exam:Tachycardic    Pulmonary exam:on BiPAP with coarse breath sounds    Peripheral pulses:Normal    Capillary refill:Normal    Skin exam:pink    Exam performed byMildred Golden MD    SEP-1 Core Measure Exclusion Criteria  Elevated lactate   Has the patient/family refused IV fluids? no; IVF not given, pt in acute pulmonary edema      Diagnostic Study Results     Labs -      Recent Results (from the past 12 hour(s))   EKG, 12 LEAD, INITIAL    Collection Time: 10/11/17  7:31 AM   Result Value Ref Range    Ventricular Rate 119 BPM    Atrial Rate 119 BPM    P-R Interval 176 ms    QRS Duration 96 ms    Q-T Interval 314 ms    QTC Calculation (Bezet) 441 ms    Calculated P Axis 6 degrees    Calculated R Axis 24 degrees    Calculated T Axis -14 degrees    Diagnosis       Sinus tachycardia  When compared with ECG of 12-SEP-2017 10:07,  Vent. rate has increased BY  57 BPM  T wave inversion now evident in Inferior leads  Confirmed by Yvrose Ba ([id number]) on 10/11/2017 12:05:29 PM     CBC WITH AUTOMATED DIFF    Collection Time: 10/11/17  7:43 AM   Result Value Ref Range    WBC 14.2 (H) 4.1 - 11.1 K/uL    RBC 3.43 (L) 4.10 - 5.70 M/uL    HGB 10.6 (L) 12.1 - 17.0 g/dL    HCT 32.2 (L) 36.6 - 50.3 %    MCV 93.9 80.0 - 99.0 FL    MCH 30.9 26.0 - 34.0 PG    MCHC 32.9 30.0 - 36.5 g/dL    RDW 14.2 11.5 - 14.5 %    PLATELET 394 (H) 353 - 400 K/uL    NEUTROPHILS 79 (H) 32 - 75 %    LYMPHOCYTES 10 (L) 12 - 49 %    MONOCYTES 6 5 - 13 %    EOSINOPHILS 5 0 - 7 %    BASOPHILS 0 0 - 1 %    ABS. NEUTROPHILS 11.2 (H) 1.8 - 8.0 K/UL    ABS. LYMPHOCYTES 1.4 0.8 - 3.5 K/UL    ABS. MONOCYTES 0.9 0.0 - 1.0 K/UL    ABS. EOSINOPHILS 0.7 (H) 0.0 - 0.4 K/UL    ABS.  BASOPHILS 0.1 0.0 - 0.1 K/UL   METABOLIC PANEL, COMPREHENSIVE    Collection Time: 10/11/17  7:43 AM   Result Value Ref Range    Sodium 135 (L) 136 - 145 mmol/L Potassium 4.1 3.5 - 5.1 mmol/L    Chloride 97 97 - 108 mmol/L    CO2 28 21 - 32 mmol/L    Anion gap 10 5 - 15 mmol/L    Glucose 143 (H) 65 - 100 mg/dL    BUN 13 6 - 20 MG/DL    Creatinine 0.71 0.70 - 1.30 MG/DL    BUN/Creatinine ratio 18 12 - 20      GFR est AA >60 >60 ml/min/1.73m2    GFR est non-AA >60 >60 ml/min/1.73m2    Calcium 9.0 8.5 - 10.1 MG/DL    Bilirubin, total 0.3 0.2 - 1.0 MG/DL    ALT (SGPT) 25 12 - 78 U/L    AST (SGOT) 18 15 - 37 U/L    Alk.  phosphatase 115 45 - 117 U/L    Protein, total 7.3 6.4 - 8.2 g/dL    Albumin 3.2 (L) 3.5 - 5.0 g/dL    Globulin 4.1 (H) 2.0 - 4.0 g/dL    A-G Ratio 0.8 (L) 1.1 - 2.2     MAGNESIUM    Collection Time: 10/11/17  7:43 AM   Result Value Ref Range    Magnesium 2.1 1.6 - 2.4 mg/dL   TROPONIN I    Collection Time: 10/11/17  7:43 AM   Result Value Ref Range    Troponin-I, Qt. 0.08 (H) <0.05 ng/mL   CK W/ CKMB & INDEX    Collection Time: 10/11/17  7:43 AM   Result Value Ref Range    CK 74 39 - 308 U/L    CK - MB 2.7 <3.6 NG/ML    CK-MB Index 3.6 (H) 0 - 2.5     NT-PRO BNP    Collection Time: 10/11/17  7:43 AM   Result Value Ref Range    NT pro- (H) 0 - 125 PG/ML   BLOOD GAS, ARTERIAL    Collection Time: 10/11/17  7:43 AM   Result Value Ref Range    pH 7.40 7.35 - 7.45      PCO2 45 35.0 - 45.0 mmHg    PO2 81 80 - 100 mmHg    O2 SAT 96 92 - 97 %    BICARBONATE 27 (H) 22 - 26 mmol/L    BASE EXCESS 1.4 mmol/L    O2 METHOD ROOM AIR      Sample source ARTERIAL      SITE LEFT RADIAL      TYERL'S TEST N/A     LACTIC ACID    Collection Time: 10/11/17  8:04 AM   Result Value Ref Range    Lactic acid 2.4 (HH) 0.4 - 2.0 MMOL/L   URINALYSIS W/ RFLX MICROSCOPIC    Collection Time: 10/11/17 10:29 AM   Result Value Ref Range    Color YELLOW/STRAW      Appearance CLEAR CLEAR      Specific gravity 1.029 1.003 - 1.030      pH (UA) 6.0 5.0 - 8.0      Protein NEGATIVE  NEG mg/dL    Glucose NEGATIVE  NEG mg/dL    Ketone 15 (A) NEG mg/dL    Bilirubin NEGATIVE  NEG      Blood NEGATIVE NEG      Urobilinogen 0.2 0.2 - 1.0 EU/dL    Nitrites NEGATIVE  NEG      Leukocyte Esterase NEGATIVE  NEG     LACTIC ACID    Collection Time: 10/11/17 11:57 AM   Result Value Ref Range    Lactic acid 1.5 0.4 - 2.0 MMOL/L   CK    Collection Time: 10/11/17 11:57 AM   Result Value Ref Range    CK 83 39 - 308 U/L   TROPONIN I    Collection Time: 10/11/17 11:57 AM   Result Value Ref Range    Troponin-I, Qt. 0.39 (H) <0.05 ng/mL       Radiologic Studies -  The following have been ordered and reviewed:  CT Results  (Last 48 hours)               10/11/17 0834  CTA CHEST W OR W WO CONT Final result    Impression:  IMPRESSION:    1. CHF: Interstitial and minimal alveolar edema, trace bilateral pleural   effusions, left ventricular enlargement. 2. No PE. Narrative:  CT ANGIOGRAPHY CHEST. 10/11/2017 8:34 AM        INDICATION: Dyspnea, respiratory distress, left total hip replacement on   9/22/2017. COMPARISON: None. TECHNIQUE: CT angiography of the chest was performed after the administration of   100 cc IV contrast (Isovue 370). Coronal and sagittal, and coronal MIP   reconstructions were performed. CT dose reduction was achieved through use of a   standardized protocol tailored for this examination and automatic exposure   control for dose modulation. FINDINGS:   There is no pulmonary embolism. Interlobular septal thickening is consistent   with interstitial edema in the setting of trace bilateral pleural effusions. Scattered groundglass opacity indicates minimal alveolar edema. Centrilobular   emphysema is mild to moderate. Incidental note is made of scarring inferior to   the right sternoclavicular joint in the right upper lobe apical segment. The   left ventricle is mildly enlarged. Mitral annular, aortic valvular, and left   anterior descending coronary calcifications are mild. Reflux of contrast into   into the hepatic veins suggests right heart dysfunction. No thoracic   lymphadenopathy. There is trace endotracheal debris; the central airways are   otherwise patent. Bronchial wall thickening is further evidence of interstitial   edema. No pneumothorax. No thoracic lymphadenopathy. The visualized upper   abdomen is otherwise normal.               CXR Results  (Last 48 hours)               10/11/17 0829  XR CHEST PORT Final result    Impression:  IMPRESSION:    New bilateral interstitial and alveolar opacities. The appearance is most   suspicious for edema superimposed on emphysema. The more confluent area in the   right lung base may represent superimposed pneumonia in the appropriate clinical   setting. Narrative:  EXAM:  XR CHEST PORT       INDICATION: dyspnea, respiratory distress       COMPARISON: Chest x-ray 2/7/2016. TECHNIQUE: Single frontal view of the chest.       FINDINGS:  Heterogeneous bilateral alveolar and interstitial opacities, most   confluent at the right lung base. No evidence of pleural effusion or   pneumothorax. The cardiomediastinal silhouette is normal.  No acute or   aggressive osseous lesion. .                   Vital Signs-Reviewed the patient's vital signs.    Patient Vitals for the past 12 hrs:   Temp Pulse Resp BP SpO2   10/11/17 1535 - - - - 98 %   10/11/17 1401 - (!) 111 14 125/84 97 %   10/11/17 1340 - - - - 98 %   10/11/17 1330 - (!) 106 15 133/89 97 %   10/11/17 1327 - (!) 110 22 - 97 %   10/11/17 1316 - (!) 108 - (!) 125/102 -   10/11/17 1311 - (!) 107 14 (!) 125/102 98 %   10/11/17 1300 - (!) 109 13 (!) 143/110 97 %   10/11/17 1230 - (!) 112 16 (!) 149/110 98 %   10/11/17 1200 - (!) 112 23 (!) 135/105 97 %   10/11/17 1132 - (!) 111 - 112/79 -   10/11/17 1130 - (!) 111 14 112/79 97 %   10/11/17 1100 - (!) 120 24 (!) 148/108 98 %   10/11/17 1037 - (!) 114 - (!) 148/112 -   10/11/17 1036 - (!) 117 20 (!) 148/112 98 %   10/11/17 0945 - (!) 108 13 (!) 143/100 98 %   10/11/17 0939 - (!) 106 - (!) 142/101 -   10/11/17 0930 - (!) 108 19 (!) 142/101 99 % 10/11/17 0915 - (!) 106 20 (!) 144/105 99 %   10/11/17 0903 - (!) 105 13 - 100 %   10/11/17 0900 - (!) 108 21 (!) 138/100 94 %   10/11/17 0815 - (!) 103 12 (!) 135/94 96 %   10/11/17 0800 - (!) 107 12 (!) 138/98 97 %   10/11/17 0745 97.5 °F (36.4 °C) (!) 109 13 (!) 132/111 99 %   10/11/17 0732 - (!) 128 - (!) 167/110 95 %       Medications Given in the ED:  Medications   sodium chloride (NS) flush 5-10 mL (10 mL IntraVENous Given 10/11/17 0839)   nitroglycerin (NITROBID) 2 % ointment 1 Inch (1 Inch Topical Given 10/11/17 1132)   sodium chloride (NS) flush 5-10 mL (0 mL IntraVENous Held 10/11/17 1400)   sodium chloride (NS) flush 5-10 mL (not administered)   acetaminophen (TYLENOL) tablet 650 mg (not administered)   ondansetron (ZOFRAN) injection 4 mg (not administered)   docusate sodium (COLACE) capsule 100 mg (100 mg Oral Refused 10/11/17 1319)   enoxaparin (LOVENOX) injection 40 mg (40 mg SubCUTAneous Given 10/11/17 1034)   albuterol-ipratropium (DUO-NEB) 2.5 MG-0.5 MG/3 ML (3 mL Nebulization Given 10/11/17 1534)   furosemide (LASIX) injection 40 mg (not administered)   aspirin (ASPIRIN) tablet 325 mg (not administered)   oxyCODONE IR (ROXICODONE) tablet 5-10 mg (10 mg Oral Given 10/11/17 1324)   allopurinol (ZYLOPRIM) tablet 300 mg (300 mg Oral Missed 10/11/17 1400)   fluticasone-vilanterol (BREO ELLIPTA) 100mcg-25mcg/puff (1 Puff Inhalation Missed 10/11/17 1400)   busPIRone (BUSPAR) tablet 15 mg (not administered)   citalopram (CELEXA) tablet 40 mg (40 mg Oral Missed 10/11/17 1400)   umeclidinium (INCRUSE ELLIPTA) 62.5 mcg/actuation (1 Puff Inhalation Missed 10/11/17 1400)   doxycycline (VIBRAMYCIN) 100 mg in 0.9% sodium chloride (MBP/ADV) 100 mL (not administered)   morphine injection 1 mg (not administered)   LORazepam (ATIVAN) injection 1 mg (1 mg IntraVENous Given 10/11/17 1129)   methylPREDNISolone (PF) (SOLU-MEDROL) injection 40 mg (not administered)   pantoprazole (PROTONIX) tablet 40 mg (0 mg Oral Held 10/11/17 1200)   valsartan (DIOVAN) tablet 160 mg (160 mg Oral Given 10/11/17 1316)   albuterol-ipratropium (DUO-NEB) 2.5 MG-0.5 MG/3 ML (3 mL Nebulization Given 10/11/17 0814)   albuterol-ipratropium (DUO-NEB) 2.5 MG-0.5 MG/3 ML (3 mL Nebulization Given 10/11/17 0744)   methylPREDNISolone (PF) (SOLU-MEDROL) injection 125 mg (125 mg IntraVENous Given 10/11/17 0748)   sodium chloride 0.9 % bolus infusion 1,000 mL (0 mL IntraVENous IV Completed 10/11/17 0928)   ondansetron (ZOFRAN) injection 4 mg (4 mg IntraVENous Given 10/11/17 0810)   aluminum-magnesium hydroxide (MAALOX) oral suspension 30 mL (30 mL Oral Given 10/11/17 0809)   lidocaine (XYLOCAINE) 2 % viscous solution 10 mL (10 mL Mouth/Throat Given 10/11/17 0809)   0.9% sodium chloride infusion (0 mL/hr IntraVENous IV Completed 10/11/17 0929)   sodium chloride (NS) flush 10 mL (10 mL IntraVENous Given 10/11/17 0835)   iopamidol (ISOVUE-370) 76 % injection 100 mL (100 mL IntraVENous Given 10/11/17 0835)   cefepime (MAXIPIME) 2 g in 0.9% sodium chloride (MBP/ADV) 100 mL (0 g IntraVENous IV Completed 10/11/17 0938)   levoFLOXacin (LEVAQUIN) 750 mg in D5W IVPB (0 mg IntraVENous IV Completed 10/11/17 1121)   furosemide (LASIX) injection 20 mg (20 mg IntraVENous Given 10/11/17 0939)   aspirin chewable tablet 324 mg (324 mg Oral Given 10/11/17 1031)   furosemide (LASIX) injection 60 mg (60 mg IntraVENous Given 10/11/17 1037)     Cardiac Monitor:   Rate: 119  Rhythm: Sinus Tachycardia      EKG interpretation: 07:31  Rhythm: sinus tachycardia; and regular . Rate (approx.): 119; Axis: normal; MS interval: normal; QRS interval: normal ; ST/T wave: non-specific ST and T wave abnormality. Diagnosis   Clinical Impression:   1. Acute on chronic respiratory failure with hypoxia (HCC)    2. Acute pulmonary edema (HCC)    3.  Chronic obstructive pulmonary disease, unspecified COPD type ([facility] [address]    4. Elevated troponin         Plan:  1: Admit to Hospitalist    Disposition Note: ADMIT NOTE:  9:25 AM  The patient is being admitted to the hospital.  The results of their tests and reasons for their admission have been discussed with the patient and/or available family. They convey agreement and understanding for the need to be admitted and for their admission diagnosis. _______________________________   Attestations: This note is prepared by Chano Fitzpatrick, acting as Scribe for Alice Sepulveda MD.    Alice Sepulveda MD: The scribe's documentation has been prepared under my direction and personally reviewed by me in its entirety.  I confirm that the note above accurately reflects all work, treatment, procedures, and medical decision making performed by me.  _______________________________

## 2017-10-11 NOTE — PROGRESS NOTES
Pt is a 62 y/o  male who presented to the ED via EMS with shortness of breath. Pt is a readmission. CM completing assessment, discharge planning. CM introduced self, role. Pt currently on Bi-Pap with wife at bedside. Pt verbally consented to CM completing assessment with wife due to currently being on Bi-Pap. Wife verbalized understanding of role. Wife verified demographic information on chart. Pt's PCP is Dr. Chelle Villanueva whom the pt last saw in August.     Pt was previously admitted on 9/21/17 with a diagnosis of a left hip OA. He was discharged on 9/21/17 to his own home with [facility] following for home health needs. Pt lives in a 2 story home with 3 steps to enter with his wife, Bria Reyna ([phone]), though the pt stays on the first floor. Pt is independent in ADL/IADL needs at baseline to include driving. He has access to a cane, rolling walker, and concentrator with 2 L of oxygen which he uses at night. Pt has supportive family to assist with transportation as needed to include discharge. Pt utilized [facility] upon discharge from last admission on 9/21/17. Pt and wife stated they would like to continue to use [facility] for home health needs if needed. Pt wife stated she did not have further questions or needs at this time. CM to continue to offer support, discharge planning as needed. Care Management Interventions  PCP Verified by CM: Yes  Last Visit to PCP: 08/11/17  Mode of Transport at Discharge: Other (see comment) (Pt family available to assist with transportation at discharge)  Transition of Care Consult (CM Consult): Discharge Planning (Pt is independent in ADL/IADL needs at baseline. Pt has utilized [facility] for home health needs prior to admission.  Pt has not utilized SNF services)  Discharge Durable Medical Equipment: No (Pt has access to a RW, cane, concentrator with 2L of oxygen at night)  Physical Therapy Consult: No  Occupational Therapy Consult: No  Current Support Network: Lives with Spouse, Own Home (Pt lives in a two story home with 3 steps to enter, but the pt stays on the first floor)  Confirm Follow Up Transport: Family (Pt does drive, but has supportive family to assist with transportation as needed)  Plan discussed with Pt/Family/Caregiver: Yes (Plan discussed wtih pt, pt wife)  Discharge Location  Discharge Placement:  (TBD)    Gerald Muller, DELLA  [address] [facility]  [phone]

## 2017-10-11 NOTE — CONSULTS
[address] [place] [address], [address] [facility]     Date of  Admission: 10/11/2017  7:26 AM     Admission type:Emergency    Consult for: abrupt onset of SOB  Consult by: ED MD     Subjective:     Jarret Lim is a 61 y.o. male admitted for Pulmonary edema, Acute respiratory failure with hypoxia ([facility] [address] Elevated troponin, Accelerated hypertension. No previous cardiac hx. Patient admitted with c/o abrupt onset of SOB and wheezing since this AM.  He c/o nonproductive cough x 2 days, then more symptomatic with fever, cough, lethargy. HX of COPD and uses nebs, inhalers, steroids - none eased the SOB. Recent left hip surgery. Sats improved with nebs, but any exertional activity worsens symptoms. Now on BIPAP still struggling for breath. ABG normal. CXray shows possible pulm edema. CT scan negative for PE. BP uncontrolled since admission. ECG with no acute changes, troponin 0.08-0.39. NTproBNP 557     Cardiac risk factors: family history, dyslipidemia, sedentary life style, male gender, hypertension.     Patient Active Problem List    Diagnosis Date Noted    Pulmonary edema 10/11/2017    Accelerated hypertension 10/11/2017    Elevated troponin 10/11/2017    Acute respiratory failure with hypoxia ([address] 10/11/2017    COPD (chronic obstructive pulmonary disease) ([facility] [address] 10/11/2017    Cardiomyopathy ([facility] [address] 19/00/5086    Acute systolic congestive heart failure, NYHA class 4 ([address] 10/11/2017    Hip arthritis 09/21/2017    Degenerative joint disease of right hip 02/29/2016      Dionne Benitez MD  Past Medical History:   Diagnosis Date    Arthritis     Chronic obstructive pulmonary disease (HCC)     Chronic pain     back    GERD (gastroesophageal reflux disease)     Hypertension     Ill-defined condition     Gout    Psychiatric disorder     Generalized Anxiety Disorder    Psychiatric disorder     ETOH abuse      Social History Social History    Marital status:      Spouse name: N/A    Number of children: N/A    Years of education: N/A     Social History Main Topics    Smoking status: Former Smoker     Packs/day: 2.50     Years: 29.00     Quit date: 2/17/1993    Smokeless tobacco: Never Used    Alcohol use No      Comment: quit march 15 2017 reports was more than 21 beers  per week     Drug use: No    Sexual activity: Not Asked     Other Topics Concern    None     Social History Narrative     Allergies   Allergen Reactions    Adhesive Tape-Silicones Other (comments)     Pulls skin off    Sulfa (Sulfonamide Antibiotics) Shortness of Breath      Family History   Problem Relation Age of Onset    Cancer Mother      lung    Arthritis-osteo Mother     Cancer Father      throat    Cancer Brother      prostate      Current Facility-Administered Medications   Medication Dose Route Frequency    sodium chloride (NS) flush 5-10 mL  5-10 mL IntraVENous PRN    nitroglycerin (NITROBID) 2 % ointment 1 Inch  1 Inch Topical Q6H    sodium chloride (NS) flush 5-10 mL  5-10 mL IntraVENous Q8H    sodium chloride (NS) flush 5-10 mL  5-10 mL IntraVENous PRN    acetaminophen (TYLENOL) tablet 650 mg  650 mg Oral Q6H PRN    ondansetron (ZOFRAN) injection 4 mg  4 mg IntraVENous Q6H PRN    docusate sodium (COLACE) capsule 100 mg  100 mg Oral BID    enoxaparin (LOVENOX) injection 40 mg  40 mg SubCUTAneous Q24H    albuterol-ipratropium (DUO-NEB) 2.5 MG-0.5 MG/3 ML  3 mL Nebulization Q4H RT    [START ON 10/12/2017] furosemide (LASIX) injection 40 mg  40 mg IntraVENous DAILY    [START ON 10/12/2017] aspirin (ASPIRIN) tablet 325 mg  325 mg Oral DAILY    oxyCODONE IR (ROXICODONE) tablet 5-10 mg  5-10 mg Oral Q4H PRN    allopurinol (ZYLOPRIM) tablet 300 mg  300 mg Oral DAILY    fluticasone-vilanterol (BREO ELLIPTA) 100mcg-25mcg/puff  1 Puff Inhalation DAILY    busPIRone (BUSPAR) tablet 15 mg  15 mg Oral TID    citalopram (CELEXA) tablet 40 mg  40 mg Oral DAILY    umeclidinium (INCRUSE ELLIPTA) 62.5 mcg/actuation  1 Puff Inhalation DAILY    [START ON 10/12/2017] doxycycline (VIBRAMYCIN) 100 mg in 0.9% sodium chloride (MBP/ADV) 100 mL  100 mg IntraVENous Q12H    morphine injection 1 mg  1 mg IntraVENous Q4H PRN    LORazepam (ATIVAN) injection 1 mg  1 mg IntraVENous Q4H PRN    methylPREDNISolone (PF) (SOLU-MEDROL) injection 40 mg  40 mg IntraVENous Q8H    pantoprazole (PROTONIX) tablet 40 mg  40 mg Oral ACB    valsartan (DIOVAN) tablet 160 mg  160 mg Oral DAILY    atorvastatin (LIPITOR) tablet 20 mg  20 mg Oral DAILY     Current Outpatient Prescriptions   Medication Sig    valsartan (DIOVAN) 160 mg tablet Take 160 mg by mouth daily.  predniSONE (DELTASONE) 20 mg tablet Take 20 mg by mouth daily (with breakfast).  ketoconazole (NIZORAL) 2 % shampoo Apply  to affected area daily as needed for Itching. Apply to scalp and face up to three times a week as needed for flaky skin    oxyCODONE-acetaminophen (PERCOCET 10)  mg per tablet Take 1 Tab by mouth three (3) times daily as needed for Pain (Back pain).  loratadine (CLARITIN) 10 mg tablet Take 10 mg by mouth.  diclofenac EC (VOLTAREN) 75 mg EC tablet Take 75 mg by mouth two (2) times a day.  aspirin delayed-release 325 mg tablet Take 1 Tab by mouth two (2) times a day for 30 days.  allopurinol (ZYLOPRIM) 300 mg tablet Take 300 mg by mouth daily.  omeprazole (PRILOSEC) 20 mg capsule Take 20 mg by mouth daily.  busPIRone (BUSPAR) 15 mg tablet Take 15 mg by mouth three (3) times daily.  citalopram (CELEXA) 40 mg tablet Take 40 mg by mouth daily.  budesonide-formoterol (SYMBICORT) 160-4.5 mcg/actuation HFA inhaler Take 2 Puffs by inhalation two (2) times daily as needed (Shortness of Breath).  tiotropium (SPIRIVA) 18 mcg inhalation capsule Take 1 Cap by inhalation daily.     albuterol (PROVENTIL HFA, VENTOLIN HFA, PROAIR HFA) 90 mcg/actuation inhaler Take 2 Puffs by inhalation daily as needed for Wheezing or Shortness of Breath.  testosterone cypionate (DEPOTESTOTERONE CYPIONATE) 200 mg/mL injection 200 mg by IntraMUSCular route every fourteen (14) days. Review of Symptoms:   Constitutional: negative  Eyes: negative   Ears, nose, mouth, throat, and face: negative  Respiratory: COWART, now with worsening SOB at rest   Cardiovascular: negative   Gastrointestinal: negative  Genitourinary:negative   Musculoskeletal:left hip pain due to surgery, LE edema worse over last few days  Neurological: negative   Endocrine: negative          Objective:      Visit Vitals    BP (!) 138/97    Pulse (!) 105    Temp 98.1 °F (36.7 °C)    Resp 13    Ht 5' 7\" (1.702 m)    Wt 200 lb (90.7 kg)    SpO2 94%    BMI 31.32 kg/m2       Physical:   General: slightly obese  male in no acute distress  Heart: ST, no m/S3/JVD, no carotid bruits   Lungs: basilar crackles/rhonchi  Abdomen: Soft, +BS, NTND   Extremities: LE arely +DP/PT, L>R +2-3 edema   Neurologic: Grossly normal    Data Review:   Recent Labs      10/11/17   0743   WBC  14.2*   HGB  10.6*   HCT  32.2*   PLT  653*     Recent Labs      10/11/17   0743   NA  135*   K  4.1   CL  97   CO2  28   GLU  143*   BUN  13   CREA  0.71   CA  9.0   MG  2.1   ALB  3.2*   TBILI  0.3   SGOT  18   ALT  25       Recent Labs      10/11/17   1605  10/11/17   1157  10/11/17   0743   TROIQ  0.66*  0.39*  0.08*   CPK   --   83  74   CKMB   --    --   2.7         Intake/Output Summary (Last 24 hours) at 10/11/17 1832  Last data filed at 10/11/17 1329   Gross per 24 hour   Intake              750 ml   Output             1700 ml   Net             -950 ml        Cardiographics    Telemetry: ST  ECG: ST   Echocardiogram: pending    CXRAY:  New bilateral interstitial and alveolar opacities. The appearance is most  suspicious for edema superimposed on emphysema.  The more confluent area in the  right lung base may represent superimposed pneumonia in the appropriate clinical  Setting. Echo:  SUMMARY:  Left ventricle: The ventricle was moderately dilated. Systolic function  was severely reduced. Ejection fraction was estimated in the range of 15 %  to 25 %. There was severe diffuse hypokinesis. Features were consistent  with a pseudonormal left ventricular filling pattern, with concomitant  abnormal relaxation and increased filling pressure (grade 2 diastolic  dysfunction). Right ventricle: The ventricle was mildly to moderately dilated. Systolic  function was mildly reduced. Left atrium: The atrium was severely dilated. Right atrium: The atrium was mildly dilated. Mitral valve: There was mild to moderate annular calcification. There was  moderate to severe regurgitation. Aortic valve: The valve was trileaflet. Leaflets exhibited mildly  increased thickness, mild calcification, and normal cuspal separation. Tricuspid valve: There was moderate regurgitation. There was moderate  pulmonary hypertension. Assessment:       Principal Problem:    Acute respiratory failure with hypoxia ([address] (10/11/2017)    Active Problems:    Pulmonary edema (10/11/2017)      Accelerated hypertension (10/11/2017)      Elevated troponin (10/11/2017)      COPD (chronic obstructive pulmonary disease) ([facility]) (10/11/2017)      Cardiomyopathy ([address] (10/11/2017)      Acute systolic congestive heart failure, NYHA class 4 ([facility]) (10/11/2017)         Plan:     Elevated troponin, likely due to demand ischemia from acute systolic heart failure (LVEF 15-25%), malignant hypertension superimposed on severe COPD, plus or minus underlying CAD:  With only mild troponin elevation on serial troponins, I do not believe this is a primary acute coronary syndrome  The patient will benefit from further ischemic evaluation prior to discharge, when better compensated clinically. Patient has received ASA, diuretics, ARB. BP better controlled. Started on Lovenox prophylactic. No BB at this time with likely severe COPD exacerbation  Diuresing well. Monitor I/Os. Thank you for consulting RCA. Alan Barreto MD     Patient seen and examined with nurse practitioner Sharla Urbina. I agree with the assessment and plan as noted.

## 2017-10-11 NOTE — PROGRESS NOTES
Pharmacy Clarification of the Prior to Admission Medication Regimen Retrospective to the Admission Medication Reconciliation    The patient was interviewed regarding clarification of the prior to admission medication regimen (patient on bipap but patient is a good historian and indicated medication use with nods and counting with fingers). Patient's wife present in room and obtained permission from patient to discuss drug regimen with visitor(s) present. Patient and patient's wife was questioned regarding use of any other inhalers, topical products, over the counter medications, herbal medications, vitamin products or ophthalmic/nasal/otic medication use. Information Obtained From: Rx Query, Patient, Patient's wife    Recommendations/Findings: The following amendments were made to the patient's active medication list on file at [facility]:     1) Additions:    OxyCODONE-acetaminophen (PERCOCET 10)  mg per tablet   predniSONE (DELTASONE) 20 mg table   ketoconazole (NIZORAL) 2 % shampoo      2) Removals:    Oxycodone IR (Roxicodone) 10 mg IR tablet   Senna-docusate (SENNA PLUS) 8.6-50mg tab    3) Changes:   albuterol (PROVENTIL HFA, VENTOLIN HFA, PROAIR HFA) 90 mcg/actuation inhaler (Old regimen: Take 2 puffs daily /New regimen: Take 2 puffs daily AS NEEDED)   budesonide-formoterol (SYMBICORT) 160-4.5 mcg/actuation HFA inhaler (Old regimen: Take 2 puffs two (2) times daily /New regimen: Take 2 puffs two (2) times daily AS NEEDED)   Valsartan (DIOVAN) (Old regimen: 320 mg tablet by mouth daily/New regimen: 160 mg tablet by mouth daily). 4) Pertinent Pharmacy Findings:   Patient's wife reports that patient held diclofenac EC (VOLTAREN) for 2 weeks prior to left total hip arthroplasty on 9/21/17 but has since started taking agent.  Patient's wife reports patient receives testosterone cypionate (DEPOTESTOTERONE CYPIONATE) 200 mg/mL injection IM every fourteen (14) days.   Patient's wife reports that patient held agent for 30 days prior to left total hip arthroplasty on 9/21/17 but has since started receiving agent (although she does not recall last time patient received injection). PTA medication list was corrected to the following:     Prior to Admission Medications   Prescriptions Last Dose Informant Patient Reported? Taking? albuterol (PROVENTIL HFA, VENTOLIN HFA, PROAIR HFA) 90 mcg/actuation inhaler 10/11/2017 at Unknown time Significant Other Yes Yes   Sig: Take 2 Puffs by inhalation daily as needed for Wheezing or Shortness of Breath. allopurinol (ZYLOPRIM) 300 mg tablet 10/10/2017 at Unknown time Other Yes Yes   Sig: Take 300 mg by mouth daily. aspirin delayed-release 325 mg tablet 10/10/2017 at Unknown time Other No Yes   Sig: Take 1 Tab by mouth two (2) times a day for 30 days. budesonide-formoterol (SYMBICORT) 160-4.5 mcg/actuation HFA inhaler 10/10/2017 at Unknown time Significant Other Yes Yes   Sig: Take 2 Puffs by inhalation two (2) times daily as needed (Shortness of Breath). busPIRone (BUSPAR) 15 mg tablet 10/10/2017 at Unknown time Other Yes Yes   Sig: Take 15 mg by mouth three (3) times daily. citalopram (CELEXA) 40 mg tablet 10/10/2017 at Unknown time Other Yes Yes   Sig: Take 40 mg by mouth daily. diclofenac EC (VOLTAREN) 75 mg EC tablet 10/10/2017 at Unknown time Other Yes Yes   Sig: Take 75 mg by mouth two (2) times a day.   ketoconazole (NIZORAL) 2 % shampoo 10/4/2017 at Unknown time Significant Other Yes Yes   Sig: Apply  to affected area daily as needed for Itching. Apply to scalp and face up to three times a week as needed for flaky skin   loratadine (CLARITIN) 10 mg tablet 10/10/2017 at Unknown time Other Yes Yes   Sig: Take 10 mg by mouth. omeprazole (PRILOSEC) 20 mg capsule 10/10/2017 at Unknown time Other Yes Yes   Sig: Take 20 mg by mouth daily.    oxyCODONE-acetaminophen (PERCOCET 10)  mg per tablet 10/11/2017 at Unknown time Significant Other Yes Yes Sig: Take 1 Tab by mouth three (3) times daily as needed for Pain (Back pain). predniSONE (DELTASONE) 20 mg tablet 10/11/2017 at Unknown time Significant Other Yes Yes   Sig: Take 20 mg by mouth daily (with breakfast). testosterone cypionate (DEPOTESTOTERONE CYPIONATE) 200 mg/mL injection Unknown at Unknown time Other Yes No   Si mg by IntraMUSCular route every fourteen (14) days. tiotropium (SPIRIVA) 18 mcg inhalation capsule 10/10/2017 at Unknown time Other Yes Yes   Sig: Take 1 Cap by inhalation daily. valsartan (DIOVAN) 160 mg tablet 10/10/2017 at Unknown time Other Yes Yes   Sig: Take 160 mg by mouth daily. Facility-Administered Medications: None      Thank you,  Ronit Trejo  Pharm D.  Candidate   ROSIO Pickett

## 2017-10-11 NOTE — ED NOTES
Per MD request; trail patient off of bi-pap at this time. Patient taken off of bi-pap and put on 4 L NC at this time. Will notify MD of patient condition. Per MD if patient dose well, patient will be ordered bi-pap prn.

## 2017-10-11 NOTE — ED NOTES
Placed on bipap by RT:  .40-4-12/6. Pt tolerated well, wife remains at bedside. Plan of care explained.

## 2017-10-11 NOTE — ED NOTES
Returned from CT with increased work of breathing, sats initialy 88%. Pt taking deep breaths, refusing to use neb treatment initially. Dr Tyra Jewell notified.

## 2017-10-11 NOTE — ED NOTES
Shift report received from [facility]. Assumed care of patient. Patient placed in position of comfort. Call bell in reach.

## 2017-10-11 NOTE — ED NOTES
Assisted pt to position up in bed for comfort. Wife remains at bedside. Pt resting on bipap, remains tachycardic, RR 14. No complaints at this time. Will repeat troponin at 1600 and plan for bedside echo. Waiting for room assignment in PCU.

## 2017-10-11 NOTE — ED NOTES
Pt taking only a sip of GI cocktail. States it tastes \"bad\" and has numbed his mouth. Reassurance offered.

## 2017-10-11 NOTE — ED NOTES
Pt resting with wife at bedside; on bipap, resp nonlabored. Skin cool and dry   Denies any SOB or Chest Pain.

## 2017-10-11 NOTE — H&P
Hospitalist Admission Note    NAME: Tonio Gonzalez   :  1950   MRN:  [id number]     Date/Time:  10/11/2017 9:30 AM    Patient PCP: Gianna Kim MD   Pulm:  Dr. Nallely Rizo  ______________________________________________________________________   Assessment & Plan:  Acute on chronic respiratory failure with hypoxia, POA  Acute pulmonary edema with new onset chf, POA  Accelerated hypertension, report HCTZ was discontinued prior to hip surgery due to low sodium. Elevated troponin 0.08 with chest tightness worrisome for NSTEMI, POA  SIRS due to above, meet severe sepsis criteria  COPD with chronic respiratory failure on 2L O2 at night only at baseline. Generalized anxiety disorder  S/p left LELO on 17. Change DVT prophylaxis from aspirin BID to ASA daily + lovenox while in hospital  Chronic marijuana abuse    --diurese with lasix (give additional 60mg for total 80mg today), then 40mg IV daily. Monitor I/o, daily weight. Echo ordered  --aspirin 324mg now and then resume daily. Start nitroglycerin paste, morphine. Cardiology consult, discussed with Dr. Cristóbal Cobos. Serial cardiac enzymex  --put on duoneb q4h, IV solumedrol, doxycycline for possible copd exacerbation but feel dyspnea is primarily driven by cardiac problems. Continue with bipap. Pulmonology consult  --continue home meds except stop diclofenac due to acute cardiovascular problem. --ativan IV prn anxiety in addition to his buspar and citalopram  --check drug screen    Body mass index is 31.32 kg/(m^2). Code: full. Has advance directive on file for no heroic measures if has terminal condition  DVT prophylaxis: lovenox  Surrogate decision maker:  Wife Emma Obregon cell [phone]        Subjective:   CHIEF COMPLAINT:  SOB    HISTORY OF PRESENT ILLNESS:     Tonio Gonzalez is a 61 y.o.  male with COPD on 2L O2 at night x 2 years, s/p left hip replacement surgery on  who presents with respiratory distress.   History from wife and patient. Did well after surgery, has stable left leg swelling since surgery. Over past weekend, noticed onset of mild SOB and wheezing and called PCP who started him on prednisone (he took first dose this AM). Two days ago, developed swelling in right lower leg. Today woke up around 6am with SOB that became much worse after walking to bathroom, had to sit for several minutes on commode to try and catch breath, walked back to bed and asked wife to call EMS. Has developed mild cough today, nonproductive. No fever or chills. SOB worse with exertion or even talking. Better at rest, associated with chest tightness 8/10 across chest, +nausea. In ER, CXR with new diffuse pulmonary edema. CTA chest negative PE but has changes suggestive of CHF I.e new bilateral interstitial and alveolar opacities. Denies hx of chf or MI. Did not have stress test prior to surgery. We were asked to admit for work up and evaluation of the above problems. Past Medical History:   Diagnosis Date    Arthritis     Chronic obstructive pulmonary disease (HCC)     Chronic pain     back    GERD (gastroesophageal reflux disease)     Hypertension     Ill-defined condition     Gout    Psychiatric disorder     Generalized Anxiety Disorder    Psychiatric disorder     ETOH abuse      Past Surgical History:   Procedure Laterality Date    HX COLECTOMY  2000    diverticulitis    HX OTHER SURGICAL Left     mastoidectomy and inner ear surgery- age  18   [facility] OTHER SURGICAL      investig[facility] stem cell therapy [facility]    HX TONSILLECTOMY      KNEE SCOPE,MED OR LAT MENIS REPAIR Left      Social History   Substance Use Topics    Smoking status: Former Smoker     Packs/day: 2.50     Years: 29.00     Quit date: 2/17/1993    Smokeless tobacco: Never Used    Alcohol use No      Comment: quit march 15 2017 reports was more than 21 beers  per week     Drug use:  Smokes marijuana daily.     Family History   Problem Relation Age of Onset    Cancer Mother      lung    Arthritis-osteo Mother     Cancer Father      throat    Cancer Brother      prostate     Allergies   Allergen Reactions    Adhesive Tape-Silicones Other (comments)     Pulls skin off    Sulfa (Sulfonamide Antibiotics) Shortness of Breath        Prior to Admission medications    Medication Sig Start Date End Date Taking? Authorizing Provider   valsartan (DIOVAN) 160 mg tablet Take 160 mg by mouth daily. Yes Historical Provider   predniSONE (DELTASONE) 20 mg tablet Take 20 mg by mouth daily (with breakfast). Yes Historical Provider   ketoconazole (NIZORAL) 2 % shampoo Apply  to affected area daily as needed for Itching. Apply to scalp and face up to three times a week as needed for flaky skin   Yes Historical Provider   oxyCODONE-acetaminophen (PERCOCET 10)  mg per tablet Take 1 Tab by mouth three (3) times daily as needed for Pain (Back pain). Yes Historical Provider   loratadine (CLARITIN) 10 mg tablet Take 10 mg by mouth. Yes Historical Provider   diclofenac EC (VOLTAREN) 75 mg EC tablet Take 75 mg by mouth two (2) times a day. 9/21/17  Yes Kirk Chadwick NP   aspirin delayed-release 325 mg tablet Take 1 Tab by mouth two (2) times a day for 30 days. 9/21/17 10/21/17 Yes Kirk Chadwick NP   allopurinol (ZYLOPRIM) 300 mg tablet Take 300 mg by mouth daily. Yes Historical Provider   omeprazole (PRILOSEC) 20 mg capsule Take 20 mg by mouth daily. Yes Historical Provider   busPIRone (BUSPAR) 15 mg tablet Take 15 mg by mouth three (3) times daily. Takes 2 times a day   Yes Historical Provider   citalopram (CELEXA) 40 mg tablet Take 40 mg by mouth daily. Yes Historical Provider   budesonide-formoterol (SYMBICORT) 160-4.5 mcg/actuation HFA inhaler Take 2 Puffs by inhalation two (2) times daily as needed (Shortness of Breath).    Yes Historical Provider   tiotropium (SPIRIVA) 18 mcg inhalation capsule Take 1 Cap by inhalation daily. Yes Historical Provider   albuterol (PROVENTIL HFA, VENTOLIN HFA, PROAIR HFA) 90 mcg/actuation inhaler Take 2 Puffs by inhalation daily as needed for Wheezing or Shortness of Breath. Yes Historical Provider   testosterone cypionate (DEPOTESTOTERONE CYPIONATE) 200 mg/mL injection 200 mg by IntraMUSCular route every fourteen (14) days. 17   Michael Robbins NP     REVIEW OF SYSTEMS:  POSITIVE= Bold. Negative = normal text  General:  fever, chills, sweats, generalized weakness, weight loss/gain, loss of appetite  Eyes:  blurred vision, eye pain, loss of vision, diplopia  Ear Nose and Throat:  rhinorrhea, pharyngitis  Respiratory:   cough, sputum production, SOB, wheezing, COWART, pleuritic pain  Cardiology:  chest pain, palpitations, orthopnea, PND, edema, syncope   Gastrointestinal:  abdominal pain, N/V, dysphagia, diarrhea, constipation, bleeding  Genitourinary:  frequency, urgency, dysuria, hematuria, incontinence  Muskuloskeletal :  arthralgia, myalgia  Hematology:  easy bruising, bleeding, lymphadenopathy  Dermatological:  rash, ulceration, pruritis  Endocrine:  hot flashes or polydipsia  Neurological:  headache, dizziness, confusion, focal weakness, paresthesia, memory loss, gait disturbance  Psychological: anxiety, depression, agitation      Objective:   VITALS:    Visit Vitals    BP (!) 144/105    Pulse (!) 106    Temp 97.5 °F (36.4 °C)    Resp 20    Ht 5' 7\" (1.702 m)    Wt 90.7 kg (200 lb)    SpO2 99%    BMI 31.32 kg/m2     Temp (24hrs), Av.5 °F (36.4 °C), Min:97.5 °F (36.4 °C), Max:97.5 °F (36.4 °C)    Body mass index is 31.32 kg/(m^2). PHYSICAL EXAM:    General:    Alert, overweight, acute ill, on bipap in mild respiratory distress, appears stated age. HEENT: Atraumatic, anicteric sclerae, pink conjunctivae     No oral ulcers, mucosa moist, throat clear. Hearing intact. Neck:  Supple, symmetrical,  thyroid: non tender.   Lungs:   Decrease BS, diffuse crackles  No Wheezing or Rhonchi. Chest wall:  No tenderness  + Accessory muscle use with neck retractions  Heart:   Regular  rhythm,  Tachy, No  murmur   No gallop. 1+ edema right lower leg, 1+ edema on left thigh and lower leg  Abdomen:   Soft, non-tender. Not distended. Bowel sounds normal. No masses  Extremities: No cyanosis. No clubbing  Skin:     Not pale Not Jaundiced  Cold, clammy, diaphoretic   Psych:  Not depressed. + anxious, mild restless agitated. Neurologic: EOMs intact. No facial asymmetry. No aphasia or slurred speech. Symmetrical strength, Alert and oriented X 3. Peripheral pulse: Bilateral, Radial, 2+    IMAGING RESULTS:   [x]       I have personally reviewed the actual   [x]     CXR  []     CT scan  CXR: normal heart size. New bilateral interstitial and alveolar opacities. The appearance is most  suspicious for edema superimposed on emphysema. The more confluent area in the  right lung base may represent superimposed pneumonia in the appropriate clinical  Setting. CT chest : no pulmonary embolism. Interlobular septal thickening is consistent  with interstitial edema in the setting of trace bilateral pleural effusions. Scattered groundglass opacity indicates minimal alveolar edema. Centrilobular  emphysema is mild to moderate. Left ventricle is mildly enlarged. Reflux of contrast into  into the hepatic veins suggests right heart dysfunction. No thoracic  lymphadenopathy. There is trace endotracheal debris; the central airways are  otherwise patent. Bronchial wall thickening is further evidence of interstitial  edema.     EKG:   ________________________________________________________________________  Care Plan discussed with:    Comments   Patient y    Family  y    RN     Care Manager                    Consultant:  y Dr. Kaitlin Chacon, Dr. Walter Washburn, Dr. Elva Doe   ________________________________________________________________________  Prophylaxis:  GI PPI   DVT lovenox ________________________________________________________________________  Recommended Disposition:   Home with Family y   HH/PT/OT/RN y   SNF/LTC    MARIANNE    ________________________________________________________________________  Code Status:  Full Code y   DNR/DNI    ________________________________________________________________________  TOTAL TIME:  55 minutes critical care      Comments    y Reviewed previous records       ______________________________________________________________________  Salina Buchanan MD      Procedures: see electronic medical records for all procedures/Xrays and details which were not copied into this note but were reviewed prior to creation of Plan. LAB DATA REVIEWED:    Recent Results (from the past 24 hour(s))   CBC WITH AUTOMATED DIFF    Collection Time: 10/11/17  7:43 AM   Result Value Ref Range    WBC 14.2 (H) 4.1 - 11.1 K/uL    RBC 3.43 (L) 4.10 - 5.70 M/uL    HGB 10.6 (L) 12.1 - 17.0 g/dL    HCT 32.2 (L) 36.6 - 50.3 %    MCV 93.9 80.0 - 99.0 FL    MCH 30.9 26.0 - 34.0 PG    MCHC 32.9 30.0 - 36.5 g/dL    RDW 14.2 11.5 - 14.5 %    PLATELET 647 (H) 301 - 400 K/uL    NEUTROPHILS 79 (H) 32 - 75 %    LYMPHOCYTES 10 (L) 12 - 49 %    MONOCYTES 6 5 - 13 %    EOSINOPHILS 5 0 - 7 %    BASOPHILS 0 0 - 1 %    ABS. NEUTROPHILS 11.2 (H) 1.8 - 8.0 K/UL    ABS. LYMPHOCYTES 1.4 0.8 - 3.5 K/UL    ABS. MONOCYTES 0.9 0.0 - 1.0 K/UL    ABS. EOSINOPHILS 0.7 (H) 0.0 - 0.4 K/UL    ABS.  BASOPHILS 0.1 0.0 - 0.1 K/UL   METABOLIC PANEL, COMPREHENSIVE    Collection Time: 10/11/17  7:43 AM   Result Value Ref Range    Sodium 135 (L) 136 - 145 mmol/L    Potassium 4.1 3.5 - 5.1 mmol/L    Chloride 97 97 - 108 mmol/L    CO2 28 21 - 32 mmol/L    Anion gap 10 5 - 15 mmol/L    Glucose 143 (H) 65 - 100 mg/dL    BUN 13 6 - 20 MG/DL    Creatinine 0.71 0.70 - 1.30 MG/DL    BUN/Creatinine ratio 18 12 - 20      GFR est AA >60 >60 ml/min/1.73m2    GFR est non-AA >60 >60 ml/min/1.73m2    Calcium 9.0 8.5 - 10.1 MG/DL Bilirubin, total 0.3 0.2 - 1.0 MG/DL    ALT (SGPT) 25 12 - 78 U/L    AST (SGOT) 18 15 - 37 U/L    Alk.  phosphatase 115 45 - 117 U/L    Protein, total 7.3 6.4 - 8.2 g/dL    Albumin 3.2 (L) 3.5 - 5.0 g/dL    Globulin 4.1 (H) 2.0 - 4.0 g/dL    A-G Ratio 0.8 (L) 1.1 - 2.2     MAGNESIUM    Collection Time: 10/11/17  7:43 AM   Result Value Ref Range    Magnesium 2.1 1.6 - 2.4 mg/dL   TROPONIN I    Collection Time: 10/11/17  7:43 AM   Result Value Ref Range    Troponin-I, Qt. 0.08 (H) <0.05 ng/mL   CK W/ CKMB & INDEX    Collection Time: 10/11/17  7:43 AM   Result Value Ref Range    CK 74 39 - 308 U/L    CK - MB 2.7 <3.6 NG/ML    CK-MB Index 3.6 (H) 0 - 2.5     NT-PRO BNP    Collection Time: 10/11/17  7:43 AM   Result Value Ref Range    NT pro- (H) 0 - 125 PG/ML   BLOOD GAS, ARTERIAL    Collection Time: 10/11/17  7:43 AM   Result Value Ref Range    pH 7.40 7.35 - 7.45      PCO2 45 35.0 - 45.0 mmHg    PO2 81 80 - 100 mmHg    O2 SAT 96 92 - 97 %    BICARBONATE 27 (H) 22 - 26 mmol/L    BASE EXCESS 1.4 mmol/L    O2 METHOD ROOM AIR      Sample source ARTERIAL      SITE LEFT RADIAL      TYREL'S TEST N/A     LACTIC ACID    Collection Time: 10/11/17  8:04 AM   Result Value Ref Range    Lactic acid 2.4 (HH) 0.4 - 2.0 MMOL/L

## 2017-10-11 NOTE — ED NOTES
Case discussed with Dr Mague Noel. Will give 60 of lasix for total dose of 80mg. Will assist pt to void, offer condom cath. Will give ASA dose and nitroglycerin. Will d/c vancomycin.

## 2017-10-11 NOTE — IP AVS SNAPSHOT
[address] [facility] 
[phone] Patient: Norm Alvarado MRN: [id number] :1950 You are allergic to the following Allergen Reactions Adhesive Tape-Silicones Other (comments) Pulls skin off  
    
 Sulfa (Sulfonamide Antibiotics) Shortness of Breath Immunizations Administered for This Admission Name Date Influenza Vaccine (Quad) PF 10/12/2017 Recent Documentation Height Weight BMI Smoking Status 1.702 m 89.4 kg 30.87 kg/m2 Former Smoker Unresulted Labs Order Current Status CULTURE, BLOOD Preliminary result CULTURE, BLOOD Preliminary result Emergency Contacts Name Discharge Info Relation Home Work Mobile [address] CAREGIVER [3] Spouse [3] .[phone] About your hospitalization You were admitted on:  2017 [facility] You were discharged on:  2017 Unit phone number:  [phone] Why you were hospitalized Your primary diagnosis was:  Acute Respiratory Failure With Hypoxia (Hcc) Your diagnoses also included:  Pulmonary Edema, Accelerated Hypertension, Elevated Troponin, Copd (Chronic Obstructive Pulmonary Disease) (Hcc), Cardiomyopathy (Hcc), Acute Systolic Congestive Heart Failure, Nyha Class 4 (Hcc), Pat (Paroxysmal Atrial Tachycardia) (Hcc), Hyponatremia, Anemia Providers Seen During Your Hospitalizations Provider Role Specialty Primary office phone Jair Callahan MD Attending Provider Emergency Medicine [phone] Venkata Weiss MD Attending Provider Internal Medicine [phone] Karlos Argueta MD Attending Provider Cardiology [phone] Ching Moreno MD Attending Provider Internal Medicine [phone] Your Primary Care Physician (PCP) Primary Care Physician Office Phone Office Fax Yoselni Kumar [phone] Follow-up Information Follow up With Details Comments Contact Info Connor Chavez NP On 10/18/2017 at 2:30PM at [facility]  [address] [facility] [address] [facility] 
[phone] Nannette Bumpers, MD   [address] [facility] 
[phone] Benny Stevenson MD Schedule an appointment as soon as possible for a visit in 1 week  [address] [facility] 
[phone] Your Appointments Wednesday October 18, 2017  2:30 PM EDT HOSPITAL FOLLOW-UP with Connor Chavez NP [facility] [address] [facility]) [address] [facility]  
[phone] Current Discharge Medication List  
  
START taking these medications Dose & Instructions Dispensing Information Comments Morning Noon Evening Bedtime  
 atorvastatin 20 mg tablet Commonly known as:  LIPITOR Your last dose was: Your next dose is:    
   
   
 Dose:  20 mg Take 1 Tab by mouth daily. Refills by Dr. Alana Andrade Quantity:  30 Tab Refills:  0  
     
   
   
   
  
 carvedilol 3.125 mg tablet Commonly known as:  Gillermina Begun Your last dose was: Your next dose is:    
   
   
 Dose:  3.125 mg Take 1 Tab by mouth two (2) times daily (with meals). Refills by  Dr. Ck Villa Quantity:  60 Tab Refills:  0  
     
   
   
   
  
 furosemide 40 mg tablet Commonly known as:  LASIX Your last dose was: Your next dose is:    
   
   
 1 pill BID  Refills by Dr. Ck Villa  Indications: Pulmonary Edema due to Chronic Heart Failure Quantity:  60 Tab Refills:  1  
     
   
   
   
  
 pantoprazole 40 mg tablet Commonly known as:  PROTONIX Your last dose was:     
   
Your next dose is:    
   
   
 Dose:  40 mg  
 Take 1 Tab by mouth Daily (before breakfast). Quantity:  30 Tab Refills:  0  
     
   
   
   
  
 potassium chloride 20 mEq tablet Commonly known as:  K-DUR, KLOR-CON Your last dose was: Your next dose is:    
   
   
 Dose:  40 mEq Take 2 Tabs by mouth two (2) times a day. Quantity:  60 Tab Refills:  0 Refills by Dr. Kelly Stinson CONTINUE these medications which have CHANGED Dose & Instructions Dispensing Information Comments Morning Noon Evening Bedtime  
 valsartan 160 mg tablet Commonly known as:  DIOVAN What changed:  Another medication with the same name was removed. Continue taking this medication, and follow the directions you see here. Your last dose was: Your next dose is:    
   
   
 Dose:  160 mg Take 160 mg by mouth daily. Refills:  0 CONTINUE these medications which have NOT CHANGED Dose & Instructions Dispensing Information Comments Morning Noon Evening Bedtime  
 albuterol 90 mcg/actuation inhaler Commonly known as:  PROVENTIL HFA, VENTOLIN HFA, PROAIR HFA Your last dose was: Your next dose is:    
   
   
 Dose:  2 Puff Take 2 Puffs by inhalation daily as needed for Wheezing or Shortness of Breath. Refills:  0  
     
   
   
   
  
 allopurinol 300 mg tablet Commonly known as:  Alhaji Madrigal Your last dose was: Your next dose is:    
   
   
 Dose:  300 mg Take 300 mg by mouth daily. Refills:  0  
     
   
   
   
  
 aspirin delayed-release 325 mg tablet Your last dose was: Your next dose is:    
   
   
 Dose:  325 mg Take 1 Tab by mouth two (2) times a day for 30 days. Quantity:  60 Tab Refills:  0  
     
   
   
   
  
 budesonide-formoterol 160-4.5 mcg/actuation Hfaa Commonly known as:  SYMBICORT Your last dose was: Your next dose is:    
   
   
 Dose:  2 Puff Take 2 Puffs by inhalation two (2) times daily as needed (Shortness of Breath). Refills:  0  
     
   
   
   
  
 busPIRone 15 mg tablet Commonly known as:  BUSPAR Your last dose was: Your next dose is:    
   
   
 Dose:  15 mg Take 15 mg by mouth three (3) times daily. Refills:  0  
     
   
   
   
  
 citalopram 40 mg tablet Commonly known as:  Algernon Kotyk Your last dose was: Your next dose is:    
   
   
 Dose:  40 mg Take 40 mg by mouth daily. Refills:  0 CLARITIN 10 mg tablet Generic drug:  loratadine Your last dose was: Your next dose is:    
   
   
 Dose:  10 mg Take 10 mg by mouth. Refills:  0  
     
   
   
   
  
 diclofenac EC 75 mg EC tablet Commonly known as:  VOLTAREN Your last dose was: Your next dose is:    
   
   
 Dose:  75 mg Take 75 mg by mouth two (2) times a day. Refills:  0  
     
   
   
   
  
 ketoconazole 2 % shampoo Commonly known as:  NIZORAL Your last dose was: Your next dose is:    
   
   
 Apply  to affected area daily as needed for Itching. Apply to scalp and face up to three times a week as needed for flaky skin Refills:  0  
     
   
   
   
  
 omeprazole 20 mg capsule Commonly known as:  PRILOSEC Your last dose was: Your next dose is:    
   
   
 Dose:  20 mg Take 20 mg by mouth daily. Refills:  0  
     
   
   
   
  
 oxyCODONE-acetaminophen  mg per tablet Commonly known as:  PERCOCET 10 Your last dose was: Your next dose is:    
   
   
 Dose:  1 Tab Take 1 Tab by mouth three (3) times daily as needed for Pain (Back pain). Refills:  0  
     
   
   
   
  
 predniSONE 20 mg tablet Commonly known as:  Asuncion Brink Your last dose was: Your next dose is:    
   
   
 Dose:  20 mg Take 20 mg by mouth daily (with breakfast). Refills:  0 testosterone cypionate 200 mg/mL injection Commonly known as:  DEPOTESTOTERONE CYPIONATE Your last dose was: Your next dose is:    
   
   
 Dose:  200 mg  
200 mg by IntraMUSCular route every fourteen (14) days. Quantity:  0 Vial  
Refills:  0  
     
   
   
   
  
 tiotropium 18 mcg inhalation capsule Commonly known as:  Wetherington Gottron Your last dose was: Your next dose is:    
   
   
 Dose:  1 Cap Take 1 Cap by inhalation daily. Refills:  0 Where to Get Your Medications Information on where to get these meds will be given to you by the nurse or doctor. ! Ask your nurse or doctor about these medications  
  atorvastatin 20 mg tablet  
 carvedilol 3.125 mg tablet  
 furosemide 40 mg tablet  
 pantoprazole 40 mg tablet  
 potassium chloride 20 mEq tablet Discharge Instructions HOSPITALIST DISCHARGE INSTRUCTIONS 
 
NAME: Berenice Mace :  1950 MRN:  [id number] Date/Time:  10/13/2017 6:35 PM 
 
ADMIT DATE: 10/11/2017 DISCHARGE DATE: 10/13/2017 · It is important that you take the medication exactly as they are prescribed. · Keep your medication in the bottles provided by the pharmacist and keep a list of the medication names, dosages, and times to be taken in your wallet. · Do not take other medications without consulting your doctor. What to do at [facility] Recommended diet:  Cardiac Diet Recommended activity: Activity as tolerated If you have questions regarding the hospital related prescriptions or hospital related issues please call [facility] at [phone]. You can always direct your questions to your primary care doctor if you are unable to reach your hospital physician; your PCP works as an extension of your hospital doctor just like your hospital doctor is an extension of your PCP for your time at the [facility]) If you experience any of the following symptoms then please call your primary care physician or return to the emergency room if you cannot get hold of your doctor: 
 
Fever, chills, nausea, vomiting, or persistent diarrhea Worsening weakness or new problems with your speech or balance Dark stools or visible blood in your stools New Leg swelling or shortness of breath as this could be signs of a clot Additional Instructions: 
 
 
Bring these papers with you to your follow up appointments. The papers will help your doctors be sure to continue the care plan from the hospital. 
 
 
 
 
 
 
Information obtained by : 
I understand that if any problems occur once I am at home I am to contact my physician. I understand and acknowledge receipt of the instructions indicated above. Physician's or R.N.'s Signature                                                                  Date/Time Patient or Representative Signature Admission Diagnoses: Pulmonary edema Acute respiratory failure with hypoxia [facility] Elevated troponin Accelerated hypertension Discharge Diagnoses: Principal Problem: 
  Acute respiratory failure with hypoxia ([facility] (10/11/2017) Active Problems: 
  Pulmonary edema (10/11/2017) Accelerated hypertension (10/11/2017) Elevated troponin (10/11/2017) COPD (chronic obstructive pulmonary disease) ([facility] (10/11/2017) Cardiomyopathy ([facility] (10/11/2017) Acute systolic congestive heart failure, NYHA class 4 ([facility] (10/11/2017) PAT (paroxysmal atrial tachycardia) ([facility] (10/12/2017) Overview: Brief on tele 10/12/17 Hyponatremia (10/13/2017) Anemia (10/13/2017) Heart Failure: After Your Visit Your Care Instructions Heart failure occurs when your heart does not pump as much blood as the body needs. Failure does not mean that the heart has stopped pumping but rather that it is not pumping as well as it should. Over time, this causes fluid buildup in your lungs and other parts of your body. Fluid buildup can cause shortness of breath, fatigue, swollen ankles, and other problems. By taking medicines regularly, reducing sodium (salt) in your diet, checking your weight every day, and making lifestyle changes, you can feel better and live longer. Follow-up care is a key part of your treatment and safety. Be sure to make and go to all appointments, and call your doctor if you are having problems. It's also a good idea to know your test results and keep a list of the medicines you take. How can you care for yourself at home? Medicines · Take your medicines exactly as prescribed. Call your doctor if you think you are having a problem with your medicine. · Do not take any vitamins, over-the-counter medicine, or herbal products without talking to your doctor first. Fredy Cifuentes not take ibuprofen (Advil or Motrin) and naproxen (Aleve) without talking to your doctor first. They could make your heart failure worse. · You may be taking some of the following medicine. ¨ Beta-blockers can slow heart rate, decrease blood pressure, and improve your condition. Taking a beta-blocker may lower your chance of needing to be hospitalized. ¨ Angiotensin-converting enzyme inhibitors (ACEIs) reduce the heart's workload, lower blood pressure, and reduce swelling. Taking an ACEI may lower your chance of needing to be hospitalized again. ¨ Angiotensin II receptor blockers (ARBs) work like ACEIs. Your doctor may prescribe them instead of ACEIs. ¨ Diuretics, also called water pills, reduce swelling.  
¨ Potassium supplements replace this important mineral, which is sometimes lost with diuretics. ¨ Aspirin and other blood thinners prevent blood clots, which can cause a stroke or heart attack. You will get more details on the specific medicines your doctor prescribes. Diet · Do not eat more than 2,000 milligrams (mg) of sodium each day. That is less than 1 teaspoon of salt a day, including all the salt you eat in cooking or in packaged foods. And try to lower your sodium to less than 1,500 milligrams a day if you are 46 or older, are black, or have high blood pressure, diabetes, or chronic kidney disease. People get most of their sodium from processed foods. Fast food and restaurant meals also tend to be very high in sodium. · Ask your doctor how much liquid you can drink each day. You may have to limit liquids. Weight · Weigh yourself without clothing at the same time each day. Record your weight. Call your doctor if you gain more than 3 pounds in 2 to 3 days. A sudden weight gain may mean that your heart failure is getting worse. Activity level · Start light exercise (if your doctor says it is okay). Even if you can only do a small amount, exercise will help you get stronger, have more energy, and manage your weight and your stress. Walking is an easy way to get exercise. Start out by walking a little more than you did before. Bit by bit, increase the amount you walk. · When you exercise, watch for signs that your heart is working too hard. You are pushing yourself too hard if you cannot talk while you are exercising. If you become short of breath or dizzy or have chest pain, stop, sit down, and rest. 
· If you feel \"wiped out\" the day after you exercise, walk slower or for a shorter distance until you can work up to a better pace. · Get enough rest at night. Sleeping with 1 or 2 pillows under your upper body and head may help you breathe easier. Lifestyle changes · Do not smoke. Smoking can make a heart condition worse.  If you need help quitting, talk to your doctor about stop-smoking programs and medicines. These can increase your chances of quitting for good. Quitting smoking may be the most important step you can take to protect your heart. · Limit alcohol to 2 drinks a day for men and 1 drink a day for women. Too much alcohol can cause health problems. · Limit coffee and soft drinks with caffeine. Caffeine can raise your heart rate and cause changes in your heartbeat. · Avoid getting sick from colds and the flu. Get a pneumococcal vaccine shot. If you have had one before, ask your doctor whether you need a second dose. Get a flu shot each fall. If you must be around people with colds or the flu, wash your hands often. When should you call for help? Call 911 if you have symptoms of sudden heart failure such as: 
· You have severe trouble breathing. · You cough up pink, foamy mucus. · You have a new irregular or rapid heartbeat. Call your doctor now or seek immediate medical care if: 
· You have new or increased shortness of breath. · You are dizzy or lightheaded, or you feel like you may faint. · You have sudden weight gain, such as 3 pounds or more in 2 to 3 days. · You have increased swelling in your legs, ankles, or feet. · You are suddenly so tired or weak that you cannot do your usual activities. Watch closely for changes in your health, and be sure to contact your doctor if: 
· You develop new symptoms. Where can you learn more? Go to [employer] Enter O532 in the search box to learn more about \"Heart Failure: After Your Visit. \"  
© 4657-7015 Healthwise, Incorporated. Care instructions adapted under license by Shaneka Rob (which disclaims liability or warranty for this information).  This care instruction is for use with your licensed healthcare professional. If you have questions about a medical condition or this instruction, always ask your healthcare professional. [employer] disclaims any warranty or liability for your use of this information. Content Version: 6.0.72264; Last Revised: May 27, 2011 Discharge Orders None [employer] Announcement We are excited to announce that we are making your provider's discharge notes available to you in SiTime. You will see these notes when they are completed and signed by the physician that discharged you from your recent hospital stay. If you have any questions or concerns about any information you see in SiTime, please call the Health Information Department where you were seen or reach out to your Primary Care Provider for more information about your plan of care. Introducing [facility] & HE[facility]! Darell Rosales introduces SiTime patient portal. Now you can access parts of your medical record, email your doctor's office, and request medication refills online. 1. In your internet browser, go to [email or web address] 2. Click on the First Time User? Click Here link in the Sign In box. You will see the New Member Sign Up page. 3. Enter your SiTime Access Code exactly as it appears below. You will not need to use this code after youve completed the sign-up process. If you do not sign up before the expiration date, you must request a new code. · SiTime Access Code: [email or web address] Expires: 11/22/2017  4:12 PM 
 
4. Enter the last four digits of your Social Security Number (xxxx) and Date of Birth (mm/dd/yyyy) as indicated and click Submit. You will be taken to the next sign-up page. 5. Create a SiTime ID. This will be your SiTime login ID and cannot be changed, so think of one that is secure and easy to remember. 6. Create a SiTime password. You can change your password at any time. 7. Enter your Password Reset Question and Answer. This can be used at a later time if you forget your password. 8. Enter your e-mail address.  You will receive e-mail notification when new information is available in [employer]. 9. Click Sign Up. You can now view and download portions of your medical record. 10. Click the Download Summary menu link to download a portable copy of your medical information. If you have questions, please visit the Frequently Asked Questions section of the [employer] website. Remember, [employer] is NOT to be used for urgent needs. For medical emergencies, dial 911. Now available from your iPhone and Android! General Information Please provide this summary of care documentation to your next provider. Patient Signature:  ____________________________________________________________ Date:  ____________________________________________________________  
  
Modesto Shove Provider Signature:  ____________________________________________________________ Date:  ____________________________________________________________

## 2017-10-11 NOTE — ED NOTES
TRANSFER - OUT REPORT:    Verbal report given to Johnny Mckinney RN (name) on No Godoy  being transferred to PCU (unit) for routine progression of care       Report consisted of patients Situation, Background, Assessment and   Recommendations(SBAR). Information from the following report(s) SBAR, Kardex, ED Summary, Intake/Output, MAR, Accordion, Recent Results and Cardiac Rhythm Sinus tach was reviewed with the receiving nurse. Lines:   Peripheral IV 10/11/17 Right Antecubital (Active)   Site Assessment Clean, dry, & intact 10/11/2017  7:55 AM   Phlebitis Assessment 0 10/11/2017  7:55 AM   Infiltration Assessment 0 10/11/2017  7:55 AM   Dressing Status Clean, dry, & intact 10/11/2017  7:55 AM   Dressing Type Transparent 10/11/2017  7:55 AM   Hub Color/Line Status Flushed 10/11/2017  7:55 AM   Action Taken Blood drawn 10/11/2017  7:55 AM        Opportunity for questions and clarification was provided.       Patient transported with:   Monitor  O2 @ 4 liters  Registered Nurse

## 2017-10-12 ENCOUNTER — APPOINTMENT (OUTPATIENT)
Dept: GENERAL RADIOLOGY | Age: 67
DRG: 291 | End: 2017-10-12
Attending: INTERNAL MEDICINE
Payer: COMMERCIAL

## 2017-10-12 PROBLEM — I47.1 PAT (PAROXYSMAL ATRIAL TACHYCARDIA) (HCC): Status: ACTIVE | Noted: 2017-10-12

## 2017-10-12 LAB
ANION GAP SERPL CALC-SCNC: 13 MMOL/L (ref 5–15)
BASOPHILS # BLD: 0 K/UL (ref 0–0.1)
BASOPHILS NFR BLD: 0 % (ref 0–1)
BUN SERPL-MCNC: 17 MG/DL (ref 6–20)
BUN/CREAT SERPL: 30 (ref 12–20)
CALCIUM SERPL-MCNC: 8.6 MG/DL (ref 8.5–10.1)
CHLORIDE SERPL-SCNC: 93 MMOL/L (ref 97–108)
CHOLEST SERPL-MCNC: 133 MG/DL
CO2 SERPL-SCNC: 25 MMOL/L (ref 21–32)
CREAT SERPL-MCNC: 0.56 MG/DL (ref 0.7–1.3)
DIFFERENTIAL METHOD BLD: ABNORMAL
EOSINOPHIL # BLD: 0 K/UL (ref 0–0.4)
EOSINOPHIL NFR BLD: 0 % (ref 0–7)
ERYTHROCYTE [DISTWIDTH] IN BLOOD BY AUTOMATED COUNT: 14.1 % (ref 11.5–14.5)
GLUCOSE SERPL-MCNC: 149 MG/DL (ref 65–100)
HCT VFR BLD AUTO: 28.2 % (ref 36.6–50.3)
HDLC SERPL-MCNC: 57 MG/DL
HDLC SERPL: 2.3 {RATIO} (ref 0–5)
HGB BLD-MCNC: 9.6 G/DL (ref 12.1–17)
LDLC SERPL CALC-MCNC: 64.2 MG/DL (ref 0–100)
LIPID PROFILE,FLP: NORMAL
LYMPHOCYTES # BLD: 0.4 K/UL (ref 0.8–3.5)
LYMPHOCYTES NFR BLD: 4 % (ref 12–49)
MCH RBC QN AUTO: 31.2 PG (ref 26–34)
MCHC RBC AUTO-ENTMCNC: 34 G/DL (ref 30–36.5)
MCV RBC AUTO: 91.6 FL (ref 80–99)
MONOCYTES # BLD: 0.3 K/UL (ref 0–1)
MONOCYTES NFR BLD: 3 % (ref 5–13)
NEUTS SEG # BLD: 8.9 K/UL (ref 1.8–8)
NEUTS SEG NFR BLD: 93 % (ref 32–75)
PLATELET # BLD AUTO: 624 K/UL (ref 150–400)
PLATELET COMMENTS,PCOM: ABNORMAL
POTASSIUM SERPL-SCNC: 3.3 MMOL/L (ref 3.5–5.1)
RBC # BLD AUTO: 3.08 M/UL (ref 4.1–5.7)
RBC MORPH BLD: ABNORMAL
SODIUM SERPL-SCNC: 131 MMOL/L (ref 136–145)
TRIGL SERPL-MCNC: 59 MG/DL (ref ?–150)
VLDLC SERPL CALC-MCNC: 11.8 MG/DL
WBC # BLD AUTO: 9.6 K/UL (ref 4.1–11.1)

## 2017-10-12 PROCEDURE — 74011000250 HC RX REV CODE- 250: Performed by: HOSPITALIST

## 2017-10-12 PROCEDURE — 94640 AIRWAY INHALATION TREATMENT: CPT

## 2017-10-12 PROCEDURE — 85025 COMPLETE CBC W/AUTO DIFF WBC: CPT | Performed by: HOSPITALIST

## 2017-10-12 PROCEDURE — 90686 IIV4 VACC NO PRSV 0.5 ML IM: CPT | Performed by: HOSPITALIST

## 2017-10-12 PROCEDURE — 80048 BASIC METABOLIC PNL TOTAL CA: CPT | Performed by: HOSPITALIST

## 2017-10-12 PROCEDURE — 74011250637 HC RX REV CODE- 250/637: Performed by: HOSPITALIST

## 2017-10-12 PROCEDURE — 36415 COLL VENOUS BLD VENIPUNCTURE: CPT | Performed by: HOSPITALIST

## 2017-10-12 PROCEDURE — 71010 XR CHEST PORT: CPT

## 2017-10-12 PROCEDURE — 74011000250 HC RX REV CODE- 250: Performed by: INTERNAL MEDICINE

## 2017-10-12 PROCEDURE — 74011250637 HC RX REV CODE- 250/637: Performed by: NURSE PRACTITIONER

## 2017-10-12 PROCEDURE — 80061 LIPID PANEL: CPT | Performed by: HOSPITALIST

## 2017-10-12 PROCEDURE — 65660000000 HC RM CCU STEPDOWN

## 2017-10-12 PROCEDURE — 74011250636 HC RX REV CODE- 250/636: Performed by: NURSE PRACTITIONER

## 2017-10-12 PROCEDURE — 90471 IMMUNIZATION ADMIN: CPT

## 2017-10-12 PROCEDURE — 94660 CPAP INITIATION&MGMT: CPT

## 2017-10-12 PROCEDURE — 74011250637 HC RX REV CODE- 250/637: Performed by: INTERNAL MEDICINE

## 2017-10-12 PROCEDURE — 74011250636 HC RX REV CODE- 250/636: Performed by: HOSPITALIST

## 2017-10-12 PROCEDURE — 77010033678 HC OXYGEN DAILY

## 2017-10-12 RX ORDER — FUROSEMIDE 10 MG/ML
40 INJECTION INTRAMUSCULAR; INTRAVENOUS 2 TIMES DAILY
Status: DISCONTINUED | OUTPATIENT
Start: 2017-10-12 | End: 2017-10-13

## 2017-10-12 RX ORDER — POTASSIUM CHLORIDE 20 MEQ/1
40 TABLET, EXTENDED RELEASE ORAL DAILY
Status: DISCONTINUED | OUTPATIENT
Start: 2017-10-12 | End: 2017-10-13 | Stop reason: HOSPADM

## 2017-10-12 RX ORDER — CARVEDILOL 3.12 MG/1
3.12 TABLET ORAL 2 TIMES DAILY WITH MEALS
Status: DISCONTINUED | OUTPATIENT
Start: 2017-10-12 | End: 2017-10-13 | Stop reason: HOSPADM

## 2017-10-12 RX ORDER — IPRATROPIUM BROMIDE AND ALBUTEROL SULFATE 2.5; .5 MG/3ML; MG/3ML
3 SOLUTION RESPIRATORY (INHALATION)
Status: DISCONTINUED | OUTPATIENT
Start: 2017-10-12 | End: 2017-10-13

## 2017-10-12 RX ADMIN — OXYCODONE HYDROCHLORIDE 10 MG: 5 TABLET ORAL at 20:43

## 2017-10-12 RX ADMIN — CITALOPRAM HYDROBROMIDE 40 MG: 20 TABLET ORAL at 08:00

## 2017-10-12 RX ADMIN — PANTOPRAZOLE SODIUM 40 MG: 40 TABLET, DELAYED RELEASE ORAL at 08:00

## 2017-10-12 RX ADMIN — ATORVASTATIN CALCIUM 20 MG: 20 TABLET, FILM COATED ORAL at 08:00

## 2017-10-12 RX ADMIN — ENOXAPARIN SODIUM 40 MG: 40 INJECTION SUBCUTANEOUS at 11:21

## 2017-10-12 RX ADMIN — OXYCODONE HYDROCHLORIDE 10 MG: 5 TABLET ORAL at 12:37

## 2017-10-12 RX ADMIN — VALSARTAN 160 MG: 160 TABLET ORAL at 08:00

## 2017-10-12 RX ADMIN — OXYCODONE HYDROCHLORIDE 10 MG: 5 TABLET ORAL at 02:12

## 2017-10-12 RX ADMIN — Medication 10 ML: at 21:07

## 2017-10-12 RX ADMIN — FUROSEMIDE 40 MG: 10 INJECTION, SOLUTION INTRAMUSCULAR; INTRAVENOUS at 16:39

## 2017-10-12 RX ADMIN — IPRATROPIUM BROMIDE AND ALBUTEROL SULFATE 3 ML: .5; 3 SOLUTION RESPIRATORY (INHALATION) at 03:56

## 2017-10-12 RX ADMIN — ALLOPURINOL 300 MG: 300 TABLET ORAL at 08:00

## 2017-10-12 RX ADMIN — POTASSIUM CHLORIDE 40 MEQ: 1500 TABLET, EXTENDED RELEASE ORAL at 12:37

## 2017-10-12 RX ADMIN — Medication 10 ML: at 12:39

## 2017-10-12 RX ADMIN — CARVEDILOL 3.12 MG: 3.12 TABLET, FILM COATED ORAL at 16:36

## 2017-10-12 RX ADMIN — ASPIRIN 325 MG ORAL TABLET 325 MG: 325 PILL ORAL at 08:00

## 2017-10-12 RX ADMIN — OXYCODONE HYDROCHLORIDE 10 MG: 5 TABLET ORAL at 16:36

## 2017-10-12 RX ADMIN — DOCUSATE SODIUM 100 MG: 100 CAPSULE, LIQUID FILLED ORAL at 08:00

## 2017-10-12 RX ADMIN — BUSPIRONE HYDROCHLORIDE 15 MG: 10 TABLET ORAL at 16:36

## 2017-10-12 RX ADMIN — BUSPIRONE HYDROCHLORIDE 15 MG: 10 TABLET ORAL at 08:00

## 2017-10-12 RX ADMIN — FLUTICASONE FUROATE AND VILANTEROL TRIFENATATE 1 PUFF: 100; 25 POWDER RESPIRATORY (INHALATION) at 09:13

## 2017-10-12 RX ADMIN — UMECLIDINIUM 1 PUFF: 62.5 AEROSOL, POWDER ORAL at 09:13

## 2017-10-12 RX ADMIN — IPRATROPIUM BROMIDE AND ALBUTEROL SULFATE 3 ML: .5; 3 SOLUTION RESPIRATORY (INHALATION) at 19:40

## 2017-10-12 RX ADMIN — IPRATROPIUM BROMIDE AND ALBUTEROL SULFATE 3 ML: .5; 3 SOLUTION RESPIRATORY (INHALATION) at 07:45

## 2017-10-12 RX ADMIN — INFLUENZA VIRUS VACCINE 0.5 ML: 15; 15; 15; 15 SUSPENSION INTRAMUSCULAR at 08:01

## 2017-10-12 RX ADMIN — IPRATROPIUM BROMIDE AND ALBUTEROL SULFATE 3 ML: .5; 3 SOLUTION RESPIRATORY (INHALATION) at 11:37

## 2017-10-12 RX ADMIN — Medication 10 ML: at 05:16

## 2017-10-12 RX ADMIN — OXYCODONE HYDROCHLORIDE 10 MG: 5 TABLET ORAL at 08:00

## 2017-10-12 RX ADMIN — ACETAMINOPHEN 650 MG: 325 TABLET ORAL at 11:21

## 2017-10-12 RX ADMIN — NITROGLYCERIN 1 INCH: 20 OINTMENT TOPICAL at 05:16

## 2017-10-12 RX ADMIN — BUSPIRONE HYDROCHLORIDE 15 MG: 10 TABLET ORAL at 21:07

## 2017-10-12 RX ADMIN — METHYLPREDNISOLONE SODIUM SUCCINATE 40 MG: 40 INJECTION, POWDER, FOR SOLUTION INTRAMUSCULAR; INTRAVENOUS at 05:16

## 2017-10-12 RX ADMIN — IPRATROPIUM BROMIDE AND ALBUTEROL SULFATE 3 ML: .5; 3 SOLUTION RESPIRATORY (INHALATION) at 15:27

## 2017-10-12 RX ADMIN — FUROSEMIDE 40 MG: 10 INJECTION, SOLUTION INTRAMUSCULAR; INTRAVENOUS at 08:00

## 2017-10-12 RX ADMIN — NITROGLYCERIN 1 INCH: 20 OINTMENT TOPICAL at 12:37

## 2017-10-12 NOTE — PROGRESS NOTES
Hospitalist Progress Note    NAME: Nataly Keita   :  1950   MRN:  [id number]       Assessment / Plan:  Acute on chronic respiratory failure with hypoxia, POA  Acute pulmonary edema with new onset chf, POA     ECHO done yesterday Show EF of 15-20%  Diffuse Hypokinesia  For Cardiac cath in am. Lung exam rales only on Left Base  Accelerated hypertension, report HCTZ was discontinued prior to hip surgery due to low sodium. Elevated troponin 0.08 with chest tightness worrisome for NSTEMI, POA  SIRS due to above, meet severe sepsis criteria      COPD with chronic respiratory failure on 2L O2 at night only at baseline. Received Stem Cell therapy    Generalized anxiety disorder    S/p left LELO on 17. Change DVT prophylaxis from aspirin BID to ASA daily + lovenox while in hospital  Needs PT in hospital when Cardiac issues are better    Chronic marijuana abuse and ETOH USE last was in March     --diurese with lasix (give additional 60mg for total 80mg today), then 40mg IV daily. Monitor I/o, daily weight. Echo ordered  --aspirin 324mg now and then resume daily. Start nitroglycerin paste, morphine. Cardiology consult, discussed with Dr. Kelly Stinson. Serial cardiac enzymex  --put on duoneb q4h, IV solumedrol, doxycycline for possible copd exacerbation but feel dyspnea is primarily driven by cardiac problems. Continue with bipap. Pulmonology consult  --continue home meds except stop diclofenac due to acute cardiovascular problem. --ativan IV prn anxiety in addition to his buspar and citalopram  --check drug screen     Body mass index is 31.32 kg/(m^2).     Code: full. Has advance directive on file for no heroic measures if has terminal condition  DVT prophylaxis: lovenox  Surrogate decision maker:  Wife Jaret Cruz cell [phone]      Body mass index is 31.39 kg/(m^2).        Subjective:     Chief Complaint / Reason for Physician Visit  F/U for SOB and   10/12  I am so happy to feel like I am not Dying Discussed with RN events overnight. Review of Systems:  Symptom Y/N Comments  Symptom Y/N Comments   Fever/Chills    Chest Pain n    Poor Appetite    Edema y    Cough    Abdominal Pain     Sputum    Joint Pain     SOB/COWART y   Pruritis/Rash     Nausea/vomit    Tolerating PT/OT     Diarrhea    Tolerating Diet     Constipation    Other       Could NOT obtain due to:      Objective:     VITALS:   Last 24hrs VS reviewed since prior progress note. Most recent are:  Patient Vitals for the past 24 hrs:   Temp Pulse Resp BP SpO2   10/12/17 0725 97.8 °F (36.6 °C) (!) 104 19 149/84 96 %   10/12/17 0357 - - - - 99 %   10/12/17 0218 98.7 °F (37.1 °C) (!) 107 17 138/89 93 %   10/11/17 2356 - (!) 102 - 130/86 -   10/11/17 2350 - - - - 100 %   10/11/17 2309 98.9 °F (37.2 °C) 98 14 130/86 100 %   10/11/17 1946 - - - - 96 %   10/11/17 1943 97.1 °F (36.2 °C) (!) 107 14 129/88 93 %   10/11/17 1855 97.7 °F (36.5 °C) (!) 108 20 (!) 147/97 94 %   10/11/17 1800 - (!) 105 13 (!) 138/97 94 %   10/11/17 1753 98.1 °F (36.7 °C) (!) 102 12 (!) 135/98 94 %   10/11/17 1706 - 99 22 - 94 %   10/11/17 1700 - (!) 104 21 (!) 126/97 97 %   10/11/17 1600 - (!) 104 12 (!) 136/105 98 %   10/11/17 1535 - - - - 98 %   10/11/17 1500 - (!) 108 19 (!) 142/110 97 %   10/11/17 1401 - (!) 111 14 125/84 97 %   10/11/17 1340 - - - - 98 %   10/11/17 1330 - (!) 106 15 133/89 97 %   10/11/17 1327 - (!) 110 22 - 97 %   10/11/17 1316 - (!) 108 - (!) 125/102 -   10/11/17 1311 - (!) 107 14 (!) 125/102 98 %   10/11/17 1300 - (!) 109 13 (!) 143/110 97 %   10/11/17 1230 - (!) 112 16 (!) 149/110 98 %   10/11/17 1200 - (!) 112 23 (!) 135/105 97 %   10/11/17 1132 - (!) 111 - 112/79 -   10/11/17 1130 - (!) 111 14 112/79 97 %       Intake/Output Summary (Last 24 hours) at 10/12/17 1113  Last data filed at 10/12/17 0516   Gross per 24 hour   Intake             1230 ml   Output             3551 ml   Net            -2321 ml        PHYSICAL EXAM:  General: WD, WN.  Alert, cooperative, no acute distress    EENT:  EOMI. Anicteric sclerae. MMM  Resp:  CTA bilaterally, no wheezing or rales. No accessory muscle use  CV:  Regular  rhythm,  plus 1 edema  GI:  Soft, Non distended, Non tender.  +Bowel sounds  Neurologic:  Alert and oriented X 3, normal speech,   Psych:   Good insight. Not anxious nor agitated  Skin:  No rashes. No jaundice    Reviewed most current lab test results and cultures  YES  Reviewed most current radiology test results   YES  Review and summation of old records today    NO  Reviewed patient's current orders and MAR    YES  PMH/SH reviewed - no change compared to H&P  ________________________________________________________________________  Care Plan discussed with:    Comments   Patient y    [facility]                        Multidiciplinary team rounds were held today with , nursing, pharmacist and clinical coordinator. Patient's plan of care was discussed; medications were reviewed and discharge planning was addressed. ________________________________________________________________________  Total NON critical care TIME:  30   Minutes    Total CRITICAL CARE TIME Spent:   Minutes non procedure based      Comments   >50% of visit spent in counseling and coordination of care     ________________________________________________________________________  Tara Victoria MD     Procedures: see electronic medical records for all procedures/Xrays and details which were not copied into this note but were reviewed prior to creation of Plan. LABS:  I reviewed today's most current labs and imaging studies.   Pertinent labs include:  Recent Labs      10/12/17   0304  10/11/17   0743   WBC  9.6  14.2*   HGB  9.6*  10.6*   HCT  28.2*  32.2*   PLT  624*  653*     Recent Labs      10/12/17   0304  10/11/17   0743   NA  131*  135*   K  3.3*  4.1   CL  93*  97   CO2  25  28   GLU  149*  143*   BUN  17  13   CREA  0.56*  0.71 CA  8.6  9.0   MG   --   2.1   ALB   --   3.2*   TBILI   --   0.3   SGOT   --   18   ALT   --   25       Signed: Deneen Clarke MD

## 2017-10-12 NOTE — PROGRESS NOTES
CM introduced self to patient and wife. Pt name and  confirmed as pt identifiers. Wife works and is not available to provide support during working hours when patient is discharged. No other family/friends available. If HH is recommended at time of discharge patient/wife would like referral sent to [facility]. Patient has been to [facility] in the past.    Patient is sitting in chair. Informed CM he has a headache and requested Tylenol. RN informed.     Belai Bellamy  Ext [phone]

## 2017-10-12 NOTE — PROGRESS NOTES
PCU SHIFT NURSING NOTE      {[facility] BEDSIDE_VERBAL_RECORDED_WRITTEN:[id number]::Bedside shift change report given to Luz (oncoming nurse) by BLAYNE's nurse). Report included the following information SBAR, Kardex, Recent Results and Cardiac Rhythm NSR. Shift Summary:Assessment completed. Denies pain. Flat affect, lungs diminished with fine crackles. Lower ext. Swelling. Old incision to left groin open to air.  0322: patient refused to be weighed on stand up scale. Admission Date 10/11/2017   Admission Diagnosis Pulmonary edema  Acute respiratory failure with hypoxia (HCC)  Elevated troponin  Accelerated hypertension   Consults IP CONSULT TO CARDIOLOGY  IP CONSULT TO PULMONOLOGY        Consults   []PT   []OT   []Speech   []Case Management      [] Palliative      Cardiac Monitoring Order   []Yes   []No     IV drips   []Yes    Drip:                            Dose:  Drip:                            Dose:  Drip:                            Dose:   []No     GI Prophylaxis   []Yes   []No         DVT Prophylaxis   SCDs:             Ernie stockings:         [] Medication   []Contraindicated   []None      Activity Level Activity Level: Bed Rest         Purposeful Rounding every 1-2 hour? []Yes   Kenyon Score  Total Score: 2   Bed Alarm (If score 3 or >)   []Yes   [] Refused (See signed refusal form in chart)   Demarcus Score  Demarcus Score: 20   Demarcus Score (if score 14 or less)   []PMT consult   []Wound Care consult      []Specialty bed   [] Nutrition consult          Needs prior to discharge:   Home O2 required:    []Yes   []No    If yes, how much O2 required?     Other:    Last Bowel Movement:        Influenza Vaccine          Pneumonia Vaccine           Diet Active Orders   Diet    DIET CARDIAC Regular      LDAs               Peripheral IV 10/11/17 Right Antecubital (Active)   Site Assessment Clean, dry, & intact 10/11/2017  7:43 PM   Phlebitis Assessment 0 10/11/2017  7:43 PM   Infiltration Assessment 0 10/11/2017  7:43 PM   Dressing Status Clean, dry, & intact 10/11/2017  7:43 PM   Dressing Type Transparent 10/11/2017  7:43 PM   Hub Color/Line Status Flushed 10/11/2017  7:55 AM   Action Taken Blood drawn 10/11/2017  7:55 AM                      Urinary Catheter      Intake & Output   Date 10/10/17 1900 - 10/11/17 0659(Not Admitted) 10/11/17 0700 - 10/12/17 0659   Shift 8359-4395 24 Hour Total 4240-0158 9049-4185 24 Hour Total   I  N  T  A  K  E   I.V.   750  (0.7)  750      I.V.   600  600      Volume (levoFLOXacin (LEVAQUIN) 750 mg in D5W IVPB)   150  150    Shift Total  (mL/kg)   750  (8.3)  750  (8.3)   O  U  T  P  U  T   Urine   1700  (1.6) 900 2600      Urine Voided   7257 635 0842    Shift Total  (mL/kg)   1700  (18.7) 900  (9.9) 2600  (28.7)   NET   -950 -900 -1850   Weight (kg)   90.7 90.7 90.7         Readmission Risk Assessment Tool Score Low Risk            12       Total Score        2 . Living with Significant Other. Assisted Living. LTAC. SNF. or   Rehab    4 IP Visits Last 12 Months (1-3=4, 4=9, >4=11)    6 Charlson Comorbidity Score (Age + Comorbid Conditions)        Criteria that do not apply:    Has Seen PCP in Last 6 Months (Yes=3, No=0)    Patient Length of Stay (>5 days = 3)    Pt.  Coverage (Medicare=5 , Medicaid, or Self-Pay=4)       Expected Length of Stay - - -   Actual Length of Stay 0

## 2017-10-12 NOTE — PROGRESS NOTES
[facility] Consult Service Note  Pulmonary, Critical Care, and Sleep Medicine    Name: Berenice Mace MRN: [id number]   : 1950 Hospital: [address] [facility]   Date: 10/12/2017   Hospital Day: 2       Subjective/Interval History:   I have reviewed the flowsheet and previous days notes. Seen earlier today on rounds. IMPRESSION:   1. Acute on chronic respiratory failure with hypoxia, POA; O2 at bedside  2. Severe COPD FEV1 1.07 Liters (33%) followed by Dr. Mast Single but obvious sign of acute exacerbation but has no reserve; not steroid dependent; This year went to [facility] for stem cells x 2 and truly believes it helps but understands he has other medical problems contributing to his dyspnea now; explained pathophysiology of limited capacity and that he will benefit from outp Cardiac and  Pulmonary rehab when cleared by Cardiology; sedetary and deconditioned  3. CHF echo LVEF 25%; from ETOH or Ischemic heart disease? ? Quit drinking months ago  4. Acute pulmonary edema with new onset chf, POA CT chest interstitial edema and peripheral edema  5. Accelerated hypertension, report HCTZ was discontinued prior to hip surgery due to low sodium. 6. Elevated troponin 0.08; worrisome for NSTEMI, POA- agree with cardiology evaluation  7. Weight loss about 40 pounds this past year- may have helped his breathing as much as his stem cell treatments and inhalers   8. Generalized anxiety disorder  9. S/p left THR on 17. Dr. Mata Sharma  10. Leg swelling; left from surgery- Dopplers 10/11 negative DVT  11. H/o heavy ETOH use- none for months; telangiectasias on exam  12. Chronic marijuana abuse      RECOMMENDATIONS/PLAN:   1. BIPAP prn  2. cardaic cath per   3. Monitor I/o, daily weight. 4. duoneb q4h,   5. May d/c steroids and doxycycline   6.  Cardiac and Pulmonary rehab when cleared by Cardiology     Patient PCP: Lucia Recinos MD   Pulm:   Andrei Tian  Surrogate decision maker:  Wife Annie Hwang cell [phone]      Code: Full Code      Risk of deterioration: medium and high   [x] High complexity decision making was performed    Subjective/Initial History:   I have reviewed the flowsheet and previous days notes. I was asked by Nataliya Alfred MD to see Sixto Hurst in consultation for a chief complaint of respiratory failure and COPD . Sixto Hurst is a 61 y.o.  male with severe COPD on 2L O2 at night x 2 years, s/p left hip replacement surgery on 9/21 who presents with respiratory distress. History from wife and patient. Did well after surgery, has stable left leg swelling since surgery. Over past weekend, noticed onset of mild SOB and wheezing and called PCP who started him on prednisone (he took first dose this AM). Two days ago, developed swelling in right lower leg. Today woke up around 6am with SOB that became much worse after walking to bathroom, had to sit for several minutes on commode to try and catch breath, walked back to bed and asked wife to call EMS. Has developed mild cough today, nonproductive. No fever or chills. SOB worse with exertion or even talking. Better at rest, associated with chest tightness 8/10 across chest, +nausea. In ER, CXR with new diffuse pulmonary edema. CTA chest negative PE but has changes suggestive of CHF I.e new bilateral interstitial and alveolar opacities. Denies hx of chf or MI. Did not have stress test prior to surgery. Per wife, pt had left total hip arthroplasty (9/21/17), and has had mild swelling over site of procedure, but has otherwise been healing well; she states pt had follow up with Dr. Fidelia Couch ~1 week ago. Pt's wife notes pt has had left lower leg swelling since procedure, which she states is slightly better than baseline this morning, as pt wore compression socks overnight. Pt sees Dr. Andrei Tian and last visit August 2017.  She states pt is not currently taking anticoagulants. Per wife, pt has never been admitted for hx of COPD, and has never been intubated. Not on chronic steroids. She denies pt has hx of DVT/PE. Pt is on 2 L O2 NC at night at baseline. Pt placed on BIPAP after returning from Ct scan for increased WOB. Feeling better after dose of lasix, morphine and some time on BIPAP. D/w  General Diver. Allergies   Allergen Reactions    Adhesive Tape-Silicones Other (comments)     Pulls skin off    Sulfa (Sulfonamide Antibiotics) Shortness of Breath        MAR reviewed and pertinent medications noted or modified as needed     Current Facility-Administered Medications   Medication    sodium chloride (NS) flush 5-10 mL    nitroglycerin (NITROBID) 2 % ointment 1 Inch    sodium chloride (NS) flush 5-10 mL    sodium chloride (NS) flush 5-10 mL    acetaminophen (TYLENOL) tablet 650 mg    ondansetron (ZOFRAN) injection 4 mg    docusate sodium (COLACE) capsule 100 mg    enoxaparin (LOVENOX) injection 40 mg    albuterol-ipratropium (DUO-NEB) 2.5 MG-0.5 MG/3 ML    furosemide (LASIX) injection 40 mg    aspirin (ASPIRIN) tablet 325 mg    oxyCODONE IR (ROXICODONE) tablet 5-10 mg    allopurinol (ZYLOPRIM) tablet 300 mg    fluticasone-vilanterol (BREO ELLIPTA) 100mcg-25mcg/puff    busPIRone (BUSPAR) tablet 15 mg    citalopram (CELEXA) tablet 40 mg    umeclidinium (INCRUSE ELLIPTA) 62.5 mcg/actuation    doxycycline (VIBRAMYCIN) 100 mg in 0.9% sodium chloride (MBP/ADV) 100 mL    morphine injection 1 mg    LORazepam (ATIVAN) injection 1 mg    methylPREDNISolone (PF) (SOLU-MEDROL) injection 40 mg    pantoprazole (PROTONIX) tablet 40 mg    valsartan (DIOVAN) tablet 160 mg    atorvastatin (LIPITOR) tablet 20 mg        PMH:  has a past medical history of Arthritis; Chronic obstructive pulmonary disease ([facility] [address] Chronic pain; GERD (gastroesophageal reflux disease); Hypertension; Ill-defined condition; Psychiatric disorder; and Psychiatric disorder.  He also has no past medical history of Adverse effect of anesthesia; Difficult intubation; Malignant hyperthermia due to anesthesia; Nausea & vomiting; or Pseudocholinesterase deficiency. PSH:   has a past surgical history that includes colectomy (); tonsillectomy; knee scope,med or lat menis repair (Left); other surgical (Left); and other surgical.   FHX: family history includes Arthritis-osteo in his mother; Cancer in his brother, father, and mother. SHX:  reports that he quit smoking about 24 years ago. He has a 72.50 pack-year smoking history. He has never used smokeless tobacco. He reports that he does not drink alcohol or use illicit drugs. ROS:A comprehensive review of systems was negative except for that written in the HPI. Telemetry:    normal sinus rhythm    Objective:     Vital Signs: Intake/Output: Intake/Output:   Visit Vitals    /89 (BP 1 Location: Left arm, BP Patient Position: At rest;Lying right side)    Pulse (!) 107    Temp 98.7 °F (37.1 °C)    Resp 17    Ht 5' 7\" (1.702 m)    Wt 90.9 kg (200 lb 6.4 oz)    SpO2 99%    BMI 31.39 kg/m2           O2 Device: BIPAP  O2 Flow Rate (L/min): 4 l/min  Wt Readings from Last 4 Encounters:   10/12/17 90.9 kg (200 lb 6.4 oz)   17 89.7 kg (197 lb 12 oz)   17 90.1 kg (198 lb 9.6 oz)   16 106.1 kg (233 lb 14.5 oz)       Temp (24hrs), Av.1 °F (36.7 °C), Min:97.1 °F (36.2 °C), Max:98.9 °F (37.2 °C)     Intake/Output Summary (Last 24 hours) at 10/12/17 0945  Last data filed at 10/12/17 0516   Gross per 24 hour   Intake             1230 ml   Output             3551 ml   Net            -2321 ml     Last shift:         Last 3 shifts: 10/10 1901 - 10/12 0700  In: 1230 [P.O.:480; I.V.:750]  Out: 3551 [Urine:3550]     Exam:    Physical Exam:    General: WM alert, cooperative and severely ill   HEENT: NCAT, BIPAP FFM; No Thrush; class 4 airway   Neck: No abnormally enlarged lymph nodes.    Chest: increased AP diameter   Lungs: rales bilaterally and no wheeze. No rhonchi   Heart: Regular rate and rhythm   Abdomen: soft, non-tender, without masses or organomegaly, protuberant, noparadox   :    Extremity: negative, cyanosis, clubbing 2-3+ bilateral lower extremity edema to the level of the knees   Neuro: alert   Psych: Alert and Oriented x 2   Skin: Pallor;    Pulses:Bilateral, Radial, 2+   Capillary refill: normal well perfused,    Data:     Interval lab and diagnostic data was reviewed. Interval radiology images were independently viewed and available reports were reviewed. All lab results for the last 24 hours reviewed. Labs:    Recent Labs      10/12/17   0304  10/11/17   0743   WBC  9.6  14.2*   HGB  9.6*  10.6*   PLT  624*  653*     Recent Labs      10/12/17   0304  10/11/17   1157  10/11/17   0804  10/11/17   0743   NA  131*   --    --   135*   K  3.3*   --    --   4.1   CL  93*   --    --   97   CO2  25   --    --   28   GLU  149*   --    --   143*   BUN  17   --    --   13   CREA  0.56*   --    --   0.71   CA  8.6   --    --   9.0   MG   --    --    --   2.1   LAC   --   1.5  2.4*   --    ALB   --    --    --   3.2*   SGOT   --    --    --   18   ALT   --    --    --   25     Recent Labs      10/11/17   0743   PH  7.40   PCO2  45   PO2  81   HCO3  27*     Recent Labs      10/11/17   1605  10/11/17   1157  10/11/17   0743   CPK   --   83  74   CKNDX   --    --   3.6*   TROIQ  0.66*  0.39*  0.08*     No results found for: BNPP, BNP   Lab Results   Component Value Date/Time    Culture result: NO GROWTH AFTER 22 HOURS 10/11/2017 08:04 AM    Culture result: NO GROWTH AFTER 15 HOURS 10/11/2017 07:43 AM    Culture result: MRSA NOT PRESENT 09/12/2017 10:49 AM    Culture result:  09/12/2017 10:49 AM         Screening of patient nares for MRSA is for surveillance purposes and, if positive, to facilitate isolation considerations in high risk settings.  It is not intended for automatic decolonization interventions per se as regimens are not sufficiently effective to warrant routine use. Lab Results   Component Value Date/Time    CK 83 10/11/2017 11:57 AM    CK 74 10/11/2017 07:43 AM     Lab Results   Component Value Date/Time    Color YELLOW/STRAW 10/11/2017 10:29 AM    Appearance CLEAR 10/11/2017 10:29 AM    pH (UA) 6.0 10/11/2017 10:29 AM    Protein NEGATIVE  10/11/2017 10:29 AM    Glucose NEGATIVE  10/11/2017 10:29 AM    Ketone 15 10/11/2017 10:29 AM    Bilirubin NEGATIVE  10/11/2017 10:29 AM    Blood NEGATIVE  10/11/2017 10:29 AM    Urobilinogen 0.2 10/11/2017 10:29 AM    Nitrites NEGATIVE  10/11/2017 10:29 AM    Leukocyte Esterase NEGATIVE  10/11/2017 10:29 AM    WBC 0-4 09/12/2017 10:49 AM    RBC 0-5 09/12/2017 10:49 AM    Bacteria NEGATIVE  09/12/2017 10:49 AM         Imaging:  I have personally reviewed the patients radiographs and have reviewed the reports:          Results from Hospital Encounter encounter on 10/11/17   XR CHEST PORT   Narrative EXAM:  XR CHEST PORT    INDICATION:  Heart Failure    COMPARISON:  10/11/2017    FINDINGS: A portable AP radiograph of the chest was obtained at 0832 hours. The  patient is on a cardiac monitor. The pulmonary edema pattern has decreased but is still present. Small pleural  effusions are noted. There are continued asymmetric opacities at the right lung  base that may represent superimposed pneumonia. Impression IMPRESSION:  1. Decreasing pulmonary edema. 2. Persistent airspace opacities in right lower lobe may reflect pneumonia or  more confluent edema          Results from Hospital Encounter encounter on 10/11/17   CTA CHEST W OR W WO CONT   Narrative CT ANGIOGRAPHY CHEST. 10/11/2017 8:34 AM     INDICATION: Dyspnea, respiratory distress, left total hip replacement on  9/22/2017. COMPARISON: None. TECHNIQUE: CT angiography of the chest was performed after the administration of  100 cc IV contrast (Isovue 370).  Coronal and sagittal, and coronal MIP  reconstructions were performed. CT dose reduction was achieved through use of a  standardized protocol tailored for this examination and automatic exposure  control for dose modulation. FINDINGS:  There is no pulmonary embolism. Interlobular septal thickening is consistent  with interstitial edema in the setting of trace bilateral pleural effusions. Scattered groundglass opacity indicates minimal alveolar edema. Centrilobular  emphysema is mild to moderate. Incidental note is made of scarring inferior to  the right sternoclavicular joint in the right upper lobe apical segment. The  left ventricle is mildly enlarged. Mitral annular, aortic valvular, and left  anterior descending coronary calcifications are mild. Reflux of contrast into  into the hepatic veins suggests right heart dysfunction. No thoracic  lymphadenopathy. There is trace endotracheal debris; the central airways are  otherwise patent. Bronchial wall thickening is further evidence of interstitial  edema. No pneumothorax. No thoracic lymphadenopathy. The visualized upper  abdomen is otherwise normal.         Impression IMPRESSION:   1. CHF: Interstitial and minimal alveolar edema, trace bilateral pleural  effusions, left ventricular enlargement. 2. No PE. My assessment/plan was discussed with:  nursing    respiratory therapy Dr. roth      Complex decision making was performed which includes reviewing the patient's past medical records, current laboratory results, medications, ECG and actual Xray films, immediately available at the bedside to the patient    Thank you for allowing us to participate in the care of this patient. We will be happy to follow with you.     Troy Bowman MD

## 2017-10-12 NOTE — PROGRESS NOTES
Primary Nurse Gilbert Shelton, RN performed a dual skin assessment on this patient No impairment noted  Demarcus score is 18

## 2017-10-12 NOTE — PROGRESS NOTES
PCU SHIFT NURSING NOTE      {BSI BEDSIDE_VERBAL_RECORDED_WRITTEN:[id number]::Bedside shift change report given to Luz (oncoming nurse) by Manjinder Bright (offgoing nurse). Report included the following information SBAR, Kardex, Recent Results and Cardiac Rhythm NSR. Shift Summary:   0430:#20 IV started in left wrist. Patient tolerated well. Admission Date 10/11/2017   Admission Diagnosis Pulmonary edema  Acute respiratory failure with hypoxia (HCC)  Elevated troponin  Accelerated hypertension   Consults IP CONSULT TO CARDIOLOGY  IP CONSULT TO PULMONOLOGY        Consults   []PT   []OT   []Speech   []Case Management      [] Palliative      Cardiac Monitoring Order   []Yes   []No     IV drips   []Yes    Drip:                            Dose:  Drip:                            Dose:  Drip:                            Dose:   []No     GI Prophylaxis   []Yes   []No         DVT Prophylaxis   SCDs:             Ernie stockings:         [] Medication   []Contraindicated   []None      Activity Level Activity Level: Up with Assistance     Activity Assistance: Partial (one person)   Purposeful Rounding every 1-2 hour? []Yes   Kenyon Score  Total Score: 1   Bed Alarm (If score 3 or >)   []Yes   [] Refused (See signed refusal form in chart)   Demarcus Score  Demarcus Score: 20   Demarcus Score (if score 14 or less)   []PMT consult   []Wound Care consult      []Specialty bed   [] Nutrition consult          Needs prior to discharge:   Home O2 required:    []Yes   []No    If yes, how much O2 required?     Other:    Last Bowel Movement: Last Bowel Movement Date: 10/12/17      Influenza Vaccine Received Flu Vaccine for Current Season (usually Sept-March): No    Patient/Guardian Refused (Notify MD): No   Pneumonia Vaccine           Diet Active Orders   Diet    DIET CARDIAC Regular    DIET NPO With Meds      LDAs               Peripheral IV 10/11/17 Right Antecubital (Active)   Site Assessment Clean, dry, & intact 10/12/2017  7:15 PM Phlebitis Assessment 0 10/12/2017  7:15 PM   Infiltration Assessment 0 10/12/2017  7:15 PM   Dressing Status Clean, dry, & intact 10/12/2017  7:15 PM   Dressing Type Transparent 10/12/2017  7:15 PM   Hub Color/Line Status Flushed 10/11/2017  7:55 AM   Action Taken Blood drawn 10/11/2017  7:55 AM   Alcohol Cap Used Yes 10/12/2017  2:18 AM                      Urinary Catheter      Intake & Output   Date 10/11/17 1900 - 10/12/17 0659 10/12/17 0700 - 10/13/17 0659   Shift 1801-5970 24 Hour Total 2032-2307 9305-2849 24 Hour Total   I  N  T  A  K  E   P.O. 480 480         P. O. 480 480       I.V.  (mL/kg/hr)  750         I.V.  600         Volume (levoFLOXacin (LEVAQUIN) 750 mg in D5W IVPB)  150       Shift Total  (mL/kg) 480  (5.3) 1230  (13.5)      O  U  T  P  U  T   Urine  (mL/kg/hr) 1850 3550 1750  (1.6)  1750      Urine Voided 1850 3550 1750  1750    Stool 1 1         Stool 1 1       Shift Total  (mL/kg) 1851  (20.4) 3551  (39.1) 1750  (19.3)  1750  (19.3)   NET -1371 -2321 -1750  -1750   Weight (kg) 90.9 90.9 90.9 90.9 90.9         Readmission Risk Assessment Tool Score Medium Risk            13       Total Score        3 Has Seen PCP in Last 6 Months (Yes=3, No=0)    4 IP Visits Last 12 Months (1-3=4, 4=9, >4=11)    6 Charlson Comorbidity Score (Age + Comorbid Conditions)        Criteria that do not apply:    . Living with Significant Other. Assisted Living. LTAC. SNF. or   Rehab    Patient Length of Stay (>5 days = 3)    Pt.  Coverage (Medicare=5 , Medicaid, or Self-Pay=4)       Expected Length of Stay 4d 21h   Actual Length of Stay 1

## 2017-10-12 NOTE — PROGRESS NOTES
[address] [place] [address]  [phone]      Cardiology Progress Note      10/12/2017 10:13 AM    Admit Date: 10/11/2017    Admit Diagnosis:   Pulmonary edema;Acute respiratory failure with hypoxia [facility]*    Subjective:     Tonio Gonzalez is a 78year old male with PMH of COPD on LTOT 2L NC at night, CMP, HTN, Hip replacement surgery 9/21 who presented to ED on 10/11 with acute respiratory distress and chest tightness. He was started on prednisone prescribed by his PCP the day of admission. He had his wife call EMS as he was unable to catch his breath after ambulating. His initial CXR was significant for pulmonary edema superimposed on emphysema; he underwent a CTA which was negative for PE and venous dopplers which were negative for DVT. His troponin was elevated at 0.39 and 0.66. He received 80 mg IV furosemide in ED and has diuresed 3.5 L over the past day. An echo was preformed and was significant for EF of 15-25%, severe LV hypokinesis, dilation of both atria and grade II DD. EKG from this morning shows Sinus tach with T wave inversions in inferior leads. The patient has no ischemic history; he reports stopping his alcohol use earlier this year. This morning, the patient remains tachypniec, pursed lipped breathing; endorses mild improvement in breathing since yesterday; denies CP.     Visit Vitals    /70    Pulse (!) 105    Temp 97.8 °F (36.6 °C)    Resp 18    Ht 5' 7\" (1.702 m)    Wt 200 lb 6.4 oz (90.9 kg)    SpO2 96%    BMI 31.39 kg/m2       Current Facility-Administered Medications   Medication Dose Route Frequency    albuterol-ipratropium (DUO-NEB) 2.5 MG-0.5 MG/3 ML  3 mL Nebulization QID RT    furosemide (LASIX) injection 40 mg  40 mg IntraVENous BID    potassium chloride (K-DUR, KLOR-CON) SR tablet 40 mEq  40 mEq Oral DAILY    carvedilol (COREG) tablet 3.125 mg  3.125 mg Oral BID WITH MEALS    sodium chloride (NS) flush 5-10 mL  5-10 mL IntraVENous PRN    nitroglycerin (NITROBID) 2 % ointment 1 Inch  1 Inch Topical Q6H    sodium chloride (NS) flush 5-10 mL  5-10 mL IntraVENous Q8H    sodium chloride (NS) flush 5-10 mL  5-10 mL IntraVENous PRN    acetaminophen (TYLENOL) tablet 650 mg  650 mg Oral Q6H PRN    ondansetron (ZOFRAN) injection 4 mg  4 mg IntraVENous Q6H PRN    docusate sodium (COLACE) capsule 100 mg  100 mg Oral BID    enoxaparin (LOVENOX) injection 40 mg  40 mg SubCUTAneous Q24H    aspirin (ASPIRIN) tablet 325 mg  325 mg Oral DAILY    oxyCODONE IR (ROXICODONE) tablet 5-10 mg  5-10 mg Oral Q4H PRN    allopurinol (ZYLOPRIM) tablet 300 mg  300 mg Oral DAILY    fluticasone-vilanterol (BREO ELLIPTA) 100mcg-25mcg/puff  1 Puff Inhalation DAILY    busPIRone (BUSPAR) tablet 15 mg  15 mg Oral TID    citalopram (CELEXA) tablet 40 mg  40 mg Oral DAILY    umeclidinium (INCRUSE ELLIPTA) 62.5 mcg/actuation  1 Puff Inhalation DAILY    morphine injection 1 mg  1 mg IntraVENous Q4H PRN    LORazepam (ATIVAN) injection 1 mg  1 mg IntraVENous Q4H PRN    pantoprazole (PROTONIX) tablet 40 mg  40 mg Oral ACB    valsartan (DIOVAN) tablet 160 mg  160 mg Oral DAILY    atorvastatin (LIPITOR) tablet 20 mg  20 mg Oral DAILY       Objective:      Physical Exam:  General Appearance:  Lying in bed, pursed lip breathing, speaking in short sentences  Chest:   Bibasilar crackles, faint expiratory wheezes on right; chest expansion symmetrical  Cardiovascular:  Irregular rate and rhythm, no murmur.   Abdomen:   Soft, non-tender, bowel sounds are active.   Extremities: 1+ LLE edema no RLE edema, 1+ pedal pulse bilaterally, 1+ radial pulses bilaterally  Skin:  Warm and dry.     Data Review:   Recent Labs      10/12/17   0304  10/11/17   0743   WBC  9.6  14.2*   HGB  9.6*  10.6*   HCT  28.2*  32.2*   PLT  624*  653*     Recent Labs      10/12/17   0304  10/11/17   0743   NA  131*  135*   K  3.3*  4.1   CL  93*  97   CO2  25  28   GLU  149*  143*   BUN  17  13 CREA  0.56*  0.71   CA  8.6  9.0   MG   --   2.1   ALB   --   3.2*   TBILI   --   0.3   SGOT   --   18   ALT   --   25       Recent Labs      10/11/17   1605  10/11/17   1157  10/11/17   0743   TROIQ  0.66*  0.39*  0.08*   CPK   --   83  74   CKMB   --    --   2.7         Intake/Output Summary (Last 24 hours) at 10/12/17 1545  Last data filed at 10/12/17 0516   Gross per 24 hour   Intake              480 ml   Output             1851 ml   Net            -1371 ml        Telemetry: Sinus tach with bursts of atrial tachycardia  EKG:  Sinus tach; t wave inversions in inferior leads  Cxray: 10/11 IMPRESSION:   New bilateral interstitial and alveolar opacities. The appearance is most  suspicious for edema superimposed on emphysema. The more confluent area in the  right lung base may represent superimposed pneumonia in the appropriate clinical  setting. Assessment:     Principal Problem:    Acute respiratory failure with hypoxia ([facility] (10/11/2017)    Active Problems:    Pulmonary edema (10/11/2017)      Accelerated hypertension (10/11/2017)      Elevated troponin (10/11/2017)      COPD (chronic obstructive pulmonary disease) ([facility] (10/11/2017)      Cardiomyopathy ([facility] (10/11/2017)      Acute systolic congestive heart failure, NYHA class 4 ([facility] (10/11/2017)      PAT (paroxysmal atrial tachycardia) ([facility] (10/12/2017)      Overview: Brief on tele 10/12/17        Plan:   Acute Hypoxic Respiratory Failure r/t acute pulmonary edema in the setting of New onset HFrEF, EF 15-25%/Dilated CM  -  Unclear etiology of severely reduced EF:  ETOH vs Viral vs Ischemic vs Takotsubo  -  Continue diuresis with IV furosemide, repeat CXR  -  Strict Is and Os, daily weights, daily BMP  -  Begin Carvedilol as BP remains elevated; may need to switch to metoprolol if wheezing worsens.  -  Continue valsartan. -  Will need cardiac cath when breathing improves.     -I discussed the risks and benefits of cardiac catheterization +/- PCI with the patient who expressed understanding and wishes to proceed. -  Will also need eval for Life Vest    Paroxysmal Atrial tachycardia- very brief run  -  Continuous telemetry monitoring  -  Electrolyte monitoring  -  Replace potassium  -  Begin carvedilol    Severe COPD (FEV1 33%)  -  Management per pulmonary team      Agree with NP Student assessment and plan    Alvaro Higuera, RN-BSN, NP [address] [place] [address]    Patient seen and examined with nurse practitioner Davion Reyna and NP student Alvaro Higuera. I agree with the assessment and plan as noted. CXR better but still quite wet.   Increase lasix today and repeat CXR in AM.

## 2017-10-12 NOTE — CARDIO/PULMONARY
CP REHAB NOTE    Chart Review: Patient admitted for acute respiratory failure with hypoxia. Elevated troponin. Accelerated HTN. Medical History: no cardiac hx, COPD, recent left hip replacement  EF 15-25%, Echo 10/11/2017  Former Smoker    Chart reviewed and pt visited for CHF teaching. The CHF teaching packet was provided. The pt also received information regarding the low sodium diet. Instruction given on s/s of CHF, checking weight every am and calling MD if weight is up 2-3 lbs in a day or 5 lbs in a week (or as directed by the physician), fluid/Na restrictions, s/s of worsening CHF and when to call MD.  Discussed the CHF \"zones\" and subsequent actions with pt. Reviewed activity as tolerated with frequent rest periods as needed, taking medications as prescribed, and the importance of follow up visits with physician. Patient was already eating low sodium and makes healthy food choices as he recently lost 50 pounds. Encouraged patient to continue with the heart healthy choices. He stated he plans to begin daily weights and we reviewed why we weigh daily and when to call the doctor. Suggested he understands his meds at discharge as he may be put on a diuretic. Patient exercises at home. Patient was encouraged to read through material and have questions ready for our next visit. Patient stated he may have a heart cath Monday. Will continue to follow.

## 2017-10-12 NOTE — PROGRESS NOTES
Failed attempt to meet with patient. Asked not to be seen at this time. Family is with patient.     Cathleen Red RN CM  Ext [phone]

## 2017-10-12 NOTE — PROGRESS NOTES
Pt accepted to [facility] placed on telemetry. Pt was SOB , asked RT for treatment, gave lasix and other med's not given by ED. Pt's lung sound diminished throughout, noted Left leg edema and redness. Sinus tach on telemetry.

## 2017-10-12 NOTE — PROGRESS NOTES
Chart review. No PT OT orders. CM reviewed with Lyle SAMS.   RN will follow up with MD>    Kev Alatorre RN CM  Ext [phone]

## 2017-10-13 ENCOUNTER — APPOINTMENT (OUTPATIENT)
Dept: GENERAL RADIOLOGY | Age: 67
DRG: 291 | End: 2017-10-13
Attending: INTERNAL MEDICINE
Payer: COMMERCIAL

## 2017-10-13 ENCOUNTER — HOSPITAL ENCOUNTER (OUTPATIENT)
Dept: CARDIAC CATH/INVASIVE PROCEDURES | Age: 67
Discharge: HOME | End: 2017-10-13
Payer: COMMERCIAL

## 2017-10-13 VITALS
HEIGHT: 67 IN | DIASTOLIC BLOOD PRESSURE: 68 MMHG | WEIGHT: 197.09 LBS | BODY MASS INDEX: 30.93 KG/M2 | HEART RATE: 84 BPM | OXYGEN SATURATION: 97 % | SYSTOLIC BLOOD PRESSURE: 117 MMHG | RESPIRATION RATE: 20 BRPM | TEMPERATURE: 97.5 F

## 2017-10-13 PROBLEM — E87.1 HYPONATREMIA: Status: ACTIVE | Noted: 2017-10-13

## 2017-10-13 PROBLEM — D64.9 ANEMIA: Status: ACTIVE | Noted: 2017-10-13

## 2017-10-13 LAB
ANION GAP SERPL CALC-SCNC: 8 MMOL/L (ref 5–15)
BUN SERPL-MCNC: 19 MG/DL (ref 6–20)
BUN/CREAT SERPL: 31 (ref 12–20)
CALCIUM SERPL-MCNC: 8.5 MG/DL (ref 8.5–10.1)
CHLORIDE SERPL-SCNC: 92 MMOL/L (ref 97–108)
CO2 SERPL-SCNC: 29 MMOL/L (ref 21–32)
CREAT SERPL-MCNC: 0.61 MG/DL (ref 0.7–1.3)
GLUCOSE SERPL-MCNC: 112 MG/DL (ref 65–100)
POTASSIUM SERPL-SCNC: 3.3 MMOL/L (ref 3.5–5.1)
SODIUM SERPL-SCNC: 129 MMOL/L (ref 136–145)

## 2017-10-13 PROCEDURE — 77030015766

## 2017-10-13 PROCEDURE — 99152 MOD SED SAME PHYS/QHP 5/>YRS: CPT

## 2017-10-13 PROCEDURE — 36415 COLL VENOUS BLD VENIPUNCTURE: CPT | Performed by: NURSE PRACTITIONER

## 2017-10-13 PROCEDURE — 74011250637 HC RX REV CODE- 250/637: Performed by: HOSPITALIST

## 2017-10-13 PROCEDURE — 74011636320 HC RX REV CODE- 636/320

## 2017-10-13 PROCEDURE — C1769 GUIDE WIRE: HCPCS

## 2017-10-13 PROCEDURE — 77030008543 HC TBNG MON PRSS MRTM -A

## 2017-10-13 PROCEDURE — 77030004549 HC CATH ANGI DX PRF MRTM -A

## 2017-10-13 PROCEDURE — C1894 INTRO/SHEATH, NON-LASER: HCPCS

## 2017-10-13 PROCEDURE — 74011250636 HC RX REV CODE- 250/636: Performed by: INTERNAL MEDICINE

## 2017-10-13 PROCEDURE — 77010033678 HC OXYGEN DAILY

## 2017-10-13 PROCEDURE — C1751 CATH, INF, PER/CENT/MIDLINE: HCPCS

## 2017-10-13 PROCEDURE — 80048 BASIC METABOLIC PNL TOTAL CA: CPT | Performed by: NURSE PRACTITIONER

## 2017-10-13 PROCEDURE — 77030010221 HC SPLNT WR POS TELE -B

## 2017-10-13 PROCEDURE — 74011000258 HC RX REV CODE- 258: Performed by: INTERNAL MEDICINE

## 2017-10-13 PROCEDURE — 74011000250 HC RX REV CODE- 250

## 2017-10-13 PROCEDURE — 74011000250 HC RX REV CODE- 250: Performed by: INTERNAL MEDICINE

## 2017-10-13 PROCEDURE — 74011250636 HC RX REV CODE- 250/636

## 2017-10-13 PROCEDURE — 77030028837 HC SYR ANGI PWR INJ COEU -A

## 2017-10-13 PROCEDURE — 77030029065 HC DRSG HEMO QCLOT ZMED -B

## 2017-10-13 PROCEDURE — 94640 AIRWAY INHALATION TREATMENT: CPT

## 2017-10-13 PROCEDURE — 71010 XR CHEST PORT: CPT

## 2017-10-13 PROCEDURE — 77030019698 HC SYR ANGI MDLON MRTM -A

## 2017-10-13 PROCEDURE — 74011250637 HC RX REV CODE- 250/637: Performed by: NURSE PRACTITIONER

## 2017-10-13 PROCEDURE — 77030019569 HC BND COMPR RAD TERU -B

## 2017-10-13 PROCEDURE — 74011250637 HC RX REV CODE- 250/637: Performed by: INTERNAL MEDICINE

## 2017-10-13 RX ORDER — VERAPAMIL HYDROCHLORIDE 2.5 MG/ML
2.5 INJECTION, SOLUTION INTRAVENOUS ONCE
Status: CANCELLED | OUTPATIENT
Start: 2017-10-13 | End: 2017-10-13

## 2017-10-13 RX ORDER — VERAPAMIL HYDROCHLORIDE 2.5 MG/ML
2.5 INJECTION, SOLUTION INTRAVENOUS ONCE
Status: COMPLETED | OUTPATIENT
Start: 2017-10-13 | End: 2017-10-13

## 2017-10-13 RX ORDER — FUROSEMIDE 40 MG/1
40 TABLET ORAL 2 TIMES DAILY
Status: DISCONTINUED | OUTPATIENT
Start: 2017-10-13 | End: 2017-10-13 | Stop reason: HOSPADM

## 2017-10-13 RX ORDER — MIDAZOLAM HYDROCHLORIDE 1 MG/ML
.5-2 INJECTION, SOLUTION INTRAMUSCULAR; INTRAVENOUS
Status: DISCONTINUED | OUTPATIENT
Start: 2017-10-13 | End: 2017-10-13

## 2017-10-13 RX ORDER — HEPARIN SODIUM 200 [USP'U]/100ML
500 INJECTION, SOLUTION INTRAVENOUS ONCE
Status: CANCELLED | OUTPATIENT
Start: 2017-10-13 | End: 2017-10-13

## 2017-10-13 RX ORDER — LIDOCAINE HYDROCHLORIDE 10 MG/ML
1-30 INJECTION, SOLUTION EPIDURAL; INFILTRATION; INTRACAUDAL; PERINEURAL
Status: CANCELLED | OUTPATIENT
Start: 2017-10-13

## 2017-10-13 RX ORDER — IPRATROPIUM BROMIDE AND ALBUTEROL SULFATE 2.5; .5 MG/3ML; MG/3ML
3 SOLUTION RESPIRATORY (INHALATION)
Status: DISCONTINUED | OUTPATIENT
Start: 2017-10-13 | End: 2017-10-13 | Stop reason: HOSPADM

## 2017-10-13 RX ORDER — HEPARIN SODIUM 1000 [USP'U]/ML
INJECTION, SOLUTION INTRAVENOUS; SUBCUTANEOUS
Status: COMPLETED
Start: 2017-10-13 | End: 2017-10-13

## 2017-10-13 RX ORDER — MIDAZOLAM HYDROCHLORIDE 1 MG/ML
.5-2 INJECTION, SOLUTION INTRAMUSCULAR; INTRAVENOUS
Status: CANCELLED | OUTPATIENT
Start: 2017-10-13

## 2017-10-13 RX ORDER — FENTANYL CITRATE 50 UG/ML
25-50 INJECTION, SOLUTION INTRAMUSCULAR; INTRAVENOUS
Status: CANCELLED | OUTPATIENT
Start: 2017-10-13

## 2017-10-13 RX ORDER — HEPARIN SODIUM 200 [USP'U]/100ML
INJECTION, SOLUTION INTRAVENOUS
Status: COMPLETED
Start: 2017-10-13 | End: 2017-10-13

## 2017-10-13 RX ORDER — CARVEDILOL 3.12 MG/1
3.12 TABLET ORAL 2 TIMES DAILY WITH MEALS
Qty: 60 TAB | Refills: 0 | Status: SHIPPED | OUTPATIENT
Start: 2017-10-13 | End: 2017-10-18 | Stop reason: SDUPTHER

## 2017-10-13 RX ORDER — LIDOCAINE HYDROCHLORIDE 10 MG/ML
1-30 INJECTION, SOLUTION EPIDURAL; INFILTRATION; INTRACAUDAL; PERINEURAL
Status: DISCONTINUED | OUTPATIENT
Start: 2017-10-13 | End: 2017-10-13

## 2017-10-13 RX ORDER — HEPARIN SODIUM 200 [USP'U]/100ML
500 INJECTION, SOLUTION INTRAVENOUS ONCE
Status: COMPLETED | OUTPATIENT
Start: 2017-10-13 | End: 2017-10-13

## 2017-10-13 RX ORDER — ATORVASTATIN CALCIUM 20 MG/1
20 TABLET, FILM COATED ORAL DAILY
Qty: 30 TAB | Refills: 0 | Status: SHIPPED | OUTPATIENT
Start: 2017-10-13 | End: 2017-12-05 | Stop reason: ALTCHOICE

## 2017-10-13 RX ORDER — HEPARIN SODIUM 1000 [USP'U]/ML
2500 INJECTION, SOLUTION INTRAVENOUS; SUBCUTANEOUS ONCE
Status: COMPLETED | OUTPATIENT
Start: 2017-10-13 | End: 2017-10-13

## 2017-10-13 RX ORDER — LIDOCAINE HYDROCHLORIDE 10 MG/ML
INJECTION, SOLUTION EPIDURAL; INFILTRATION; INTRACAUDAL; PERINEURAL
Status: COMPLETED
Start: 2017-10-13 | End: 2017-10-13

## 2017-10-13 RX ORDER — POTASSIUM CHLORIDE 20 MEQ/1
40 TABLET, EXTENDED RELEASE ORAL 2 TIMES DAILY
Qty: 60 TAB | Refills: 0 | Status: SHIPPED | OUTPATIENT
Start: 2017-10-13 | End: 2017-11-01 | Stop reason: SDUPTHER

## 2017-10-13 RX ORDER — FENTANYL CITRATE 50 UG/ML
INJECTION, SOLUTION INTRAMUSCULAR; INTRAVENOUS
Status: COMPLETED
Start: 2017-10-13 | End: 2017-10-13

## 2017-10-13 RX ORDER — PANTOPRAZOLE SODIUM 40 MG/1
40 TABLET, DELAYED RELEASE ORAL
Qty: 30 TAB | Refills: 0 | Status: SHIPPED | OUTPATIENT
Start: 2017-10-13 | End: 2018-07-05 | Stop reason: ALTCHOICE

## 2017-10-13 RX ORDER — VERAPAMIL HYDROCHLORIDE 2.5 MG/ML
INJECTION, SOLUTION INTRAVENOUS
Status: COMPLETED
Start: 2017-10-13 | End: 2017-10-13

## 2017-10-13 RX ORDER — HEPARIN SODIUM 1000 [USP'U]/ML
2500 INJECTION, SOLUTION INTRAVENOUS; SUBCUTANEOUS ONCE
Status: CANCELLED | OUTPATIENT
Start: 2017-10-13 | End: 2017-10-13

## 2017-10-13 RX ORDER — FENTANYL CITRATE 50 UG/ML
25 INJECTION, SOLUTION INTRAMUSCULAR; INTRAVENOUS ONCE
Status: COMPLETED | OUTPATIENT
Start: 2017-10-13 | End: 2017-10-13

## 2017-10-13 RX ORDER — FUROSEMIDE 40 MG/1
TABLET ORAL
Qty: 60 TAB | Refills: 1 | Status: SHIPPED | OUTPATIENT
Start: 2017-10-13 | End: 2017-10-18 | Stop reason: SDUPTHER

## 2017-10-13 RX ORDER — FENTANYL CITRATE 50 UG/ML
25-50 INJECTION, SOLUTION INTRAMUSCULAR; INTRAVENOUS
Status: DISCONTINUED | OUTPATIENT
Start: 2017-10-13 | End: 2017-10-13

## 2017-10-13 RX ORDER — MIDAZOLAM HYDROCHLORIDE 1 MG/ML
INJECTION, SOLUTION INTRAMUSCULAR; INTRAVENOUS
Status: COMPLETED
Start: 2017-10-13 | End: 2017-10-13

## 2017-10-13 RX ORDER — MIDAZOLAM HYDROCHLORIDE 1 MG/ML
1 INJECTION, SOLUTION INTRAMUSCULAR; INTRAVENOUS ONCE
Status: COMPLETED | OUTPATIENT
Start: 2017-10-13 | End: 2017-10-13

## 2017-10-13 RX ADMIN — VALSARTAN 160 MG: 160 TABLET ORAL at 10:42

## 2017-10-13 RX ADMIN — HEPARIN SODIUM 1000 UNITS: 200 INJECTION, SOLUTION INTRAVENOUS at 09:11

## 2017-10-13 RX ADMIN — FENTANYL CITRATE 25 MCG: 50 INJECTION, SOLUTION INTRAMUSCULAR; INTRAVENOUS at 09:10

## 2017-10-13 RX ADMIN — Medication 10 ML: at 05:55

## 2017-10-13 RX ADMIN — UMECLIDINIUM 1 PUFF: 62.5 AEROSOL, POWDER ORAL at 12:38

## 2017-10-13 RX ADMIN — FUROSEMIDE 40 MG: 40 TABLET ORAL at 17:52

## 2017-10-13 RX ADMIN — OXYCODONE HYDROCHLORIDE 10 MG: 5 TABLET ORAL at 05:54

## 2017-10-13 RX ADMIN — IPRATROPIUM BROMIDE AND ALBUTEROL SULFATE 3 ML: .5; 3 SOLUTION RESPIRATORY (INHALATION) at 08:11

## 2017-10-13 RX ADMIN — NITROGLYCERIN 1 INCH: 20 OINTMENT TOPICAL at 17:52

## 2017-10-13 RX ADMIN — LIDOCAINE HYDROCHLORIDE 1 ML: 10 INJECTION, SOLUTION EPIDURAL; INFILTRATION; INTRACAUDAL; PERINEURAL at 09:19

## 2017-10-13 RX ADMIN — NITROGLYCERIN 200 MCG: 5 INJECTION, SOLUTION INTRAVENOUS at 09:22

## 2017-10-13 RX ADMIN — IOPAMIDOL 50 ML: 755 INJECTION, SOLUTION INTRAVENOUS at 09:34

## 2017-10-13 RX ADMIN — ALLOPURINOL 300 MG: 300 TABLET ORAL at 10:43

## 2017-10-13 RX ADMIN — OXYCODONE HYDROCHLORIDE 10 MG: 5 TABLET ORAL at 01:24

## 2017-10-13 RX ADMIN — FENTANYL CITRATE 25 MCG: 50 INJECTION, SOLUTION INTRAMUSCULAR; INTRAVENOUS at 08:56

## 2017-10-13 RX ADMIN — DOCUSATE SODIUM 100 MG: 100 CAPSULE, LIQUID FILLED ORAL at 10:42

## 2017-10-13 RX ADMIN — VERAPAMIL HYDROCHLORIDE 2.5 MG: 2.5 INJECTION INTRAVENOUS at 09:22

## 2017-10-13 RX ADMIN — IOPAMIDOL 30 ML: 755 INJECTION, SOLUTION INTRAVENOUS at 09:30

## 2017-10-13 RX ADMIN — NITROGLYCERIN 1 INCH: 20 OINTMENT TOPICAL at 05:54

## 2017-10-13 RX ADMIN — OXYCODONE HYDROCHLORIDE 10 MG: 5 TABLET ORAL at 15:25

## 2017-10-13 RX ADMIN — POTASSIUM CHLORIDE 40 MEQ: 1500 TABLET, EXTENDED RELEASE ORAL at 10:43

## 2017-10-13 RX ADMIN — FLUTICASONE FUROATE AND VILANTEROL TRIFENATATE 1 PUFF: 100; 25 POWDER RESPIRATORY (INHALATION) at 12:38

## 2017-10-13 RX ADMIN — ATORVASTATIN CALCIUM 20 MG: 20 TABLET, FILM COATED ORAL at 10:43

## 2017-10-13 RX ADMIN — CARVEDILOL 3.12 MG: 3.12 TABLET, FILM COATED ORAL at 10:42

## 2017-10-13 RX ADMIN — MIDAZOLAM HYDROCHLORIDE 1 MG: 1 INJECTION, SOLUTION INTRAMUSCULAR; INTRAVENOUS at 08:56

## 2017-10-13 RX ADMIN — ASPIRIN 325 MG ORAL TABLET 325 MG: 325 PILL ORAL at 10:43

## 2017-10-13 RX ADMIN — CITALOPRAM HYDROBROMIDE 40 MG: 20 TABLET ORAL at 10:42

## 2017-10-13 RX ADMIN — FUROSEMIDE 40 MG: 40 TABLET ORAL at 11:28

## 2017-10-13 RX ADMIN — MIDAZOLAM HYDROCHLORIDE 1 MG: 1 INJECTION, SOLUTION INTRAMUSCULAR; INTRAVENOUS at 09:16

## 2017-10-13 RX ADMIN — MIDAZOLAM HYDROCHLORIDE 1 MG: 1 INJECTION INTRAMUSCULAR; INTRAVENOUS at 08:56

## 2017-10-13 RX ADMIN — PANTOPRAZOLE SODIUM 40 MG: 40 TABLET, DELAYED RELEASE ORAL at 10:43

## 2017-10-13 RX ADMIN — DOCUSATE SODIUM 100 MG: 100 CAPSULE, LIQUID FILLED ORAL at 17:52

## 2017-10-13 RX ADMIN — CARVEDILOL 3.12 MG: 3.12 TABLET, FILM COATED ORAL at 17:52

## 2017-10-13 RX ADMIN — MIDAZOLAM HYDROCHLORIDE 1 MG: 1 INJECTION, SOLUTION INTRAMUSCULAR; INTRAVENOUS at 09:10

## 2017-10-13 RX ADMIN — HEPARIN SODIUM 2500 UNITS: 1000 INJECTION, SOLUTION INTRAVENOUS; SUBCUTANEOUS at 09:22

## 2017-10-13 RX ADMIN — NITROGLYCERIN 1 INCH: 20 OINTMENT TOPICAL at 00:12

## 2017-10-13 RX ADMIN — SODIUM CHLORIDE 100 ML: 900 INJECTION, SOLUTION INTRAVENOUS at 09:22

## 2017-10-13 RX ADMIN — BUSPIRONE HYDROCHLORIDE 15 MG: 10 TABLET ORAL at 17:57

## 2017-10-13 RX ADMIN — MIDAZOLAM HYDROCHLORIDE 1 MG: 1 INJECTION, SOLUTION INTRAMUSCULAR; INTRAVENOUS at 08:59

## 2017-10-13 RX ADMIN — VERAPAMIL HYDROCHLORIDE 2.5 MG: 2.5 INJECTION, SOLUTION INTRAVENOUS at 09:22

## 2017-10-13 RX ADMIN — FENTANYL CITRATE 50 MCG: 50 INJECTION, SOLUTION INTRAMUSCULAR; INTRAVENOUS at 09:16

## 2017-10-13 RX ADMIN — BUSPIRONE HYDROCHLORIDE 15 MG: 10 TABLET ORAL at 11:28

## 2017-10-13 RX ADMIN — OXYCODONE HYDROCHLORIDE 10 MG: 5 TABLET ORAL at 10:43

## 2017-10-13 NOTE — CARDIO/PULMONARY
CP REHAB NOTE     Chart Review: Patient admitted for acute respiratory failure with hypoxia. Elevated troponin. Accelerated HTN. Medical History: no cardiac hx, COPD, recent left hip replacement  EF 15-25%, Echo 10/11/2017  Former Smoker. Attempt to see for CHF follow up teaching-currently in CCL.     Will continue to follow

## 2017-10-13 NOTE — PROGRESS NOTES
PCU SHIFT NURSING NOTE      Bedside and Verbal shift change report given to Amy Dixon (oncoming nurse) by Tate Dubose (offgoing nurse). Report included the following information SBAR, Kardex, Procedure Summary, Intake/Output and MAR. Shift Summary:   0815: Chest xray done at bedside  0840: Patient off unit for cardiac cath      Admission Date 10/11/2017   Admission Diagnosis Pulmonary edema  Acute respiratory failure with hypoxia (HCC)  Elevated troponin  Accelerated hypertension   Consults IP CONSULT TO CARDIOLOGY  IP CONSULT TO PULMONOLOGY        Consults   []PT   []OT   []Speech   []Case Management      [] Palliative      Cardiac Monitoring Order   []Yes   []No     IV drips   []Yes    Drip:                            Dose:  Drip:                            Dose:  Drip:                            Dose:   []No     GI Prophylaxis   []Yes   []No         DVT Prophylaxis   SCDs:             Ernie stockings:         [] Medication   []Contraindicated   []None      Activity Level Activity Level: Up with Assistance     Activity Assistance: Partial (one person)   Purposeful Rounding every 1-2 hour? []Yes   Kenyon Score  Total Score: 2   Bed Alarm (If score 3 or >)   []Yes   [] Refused (See signed refusal form in chart)   Demarcus Score  Demarcus Score: 20   Demarcus Score (if score 14 or less)   []PMT consult   []Wound Care consult      []Specialty bed   [] Nutrition consult          Needs prior to discharge:   Home O2 required:    []Yes   []No    If yes, how much O2 required?     Other:    Last Bowel Movement: Last Bowel Movement Date: 10/12/17      Influenza Vaccine Received Flu Vaccine for Current Season (usually Sept-March): No    Patient/Guardian Refused (Notify MD): No   Pneumonia Vaccine           Diet Active Orders   Diet    DIET NPO With Meds      LDAs               Peripheral IV 10/11/17 Right Antecubital (Active)   Site Assessment Clean, dry, & intact 10/13/2017  3:27 AM   Phlebitis Assessment 0 10/13/2017  3:27 AM Infiltration Assessment 0 10/13/2017  3:27 AM   Dressing Status Clean, dry, & intact 10/13/2017  3:27 AM   Dressing Type Transparent 10/13/2017  3:27 AM   Hub Color/Line Status Flushed 10/11/2017  7:55 AM   Action Taken Blood drawn 10/11/2017  7:55 AM   Alcohol Cap Used Yes 10/12/2017  2:18 AM       Peripheral IV 10/13/17 Left Wrist (Active)   Site Assessment Clean, dry, & intact 10/13/2017  4:36 AM   Phlebitis Assessment 0 10/13/2017  4:36 AM   Infiltration Assessment 0 10/13/2017  4:36 AM   Dressing Status Clean, dry, & intact 10/13/2017  4:36 AM   Dressing Type Transparent 10/13/2017  4:36 AM   Hub Color/Line Status Pink 10/13/2017  4:36 AM   Alcohol Cap Used Yes 10/13/2017  4:36 AM                      Urinary Catheter      Intake & Output   Date 10/12/17 0700 - 10/13/17 0659 10/13/17 0700 - 10/14/17 0659   Shift 3242-7477 7545-0046 24 Hour Total 9712-2535 4813-3476 24 Hour Total   I  N  T  A  K  E   Shift Total  (mL/kg)         O  U  T  P  U  T   Urine  (mL/kg/hr) 1750  (1.6) 850  (0.8) 2600  (1.2)         Urine Voided 4420 082 1767       Shift Total  (mL/kg) 1750  (19.3) 850  (9.5) 2600  (29.1)      NET -1750 -850 -2600      Weight (kg) 90.9 89.4 89.4 89.4 89.4 89.4         Readmission Risk Assessment Tool Score Medium Risk            13       Total Score        3 Has Seen PCP in Last 6 Months (Yes=3, No=0)    4 IP Visits Last 12 Months (1-3=4, 4=9, >4=11)    6 Charlson Comorbidity Score (Age + Comorbid Conditions)        Criteria that do not apply:    . Living with Significant Other. Assisted Living. LTAC. SNF. or   Rehab    Patient Length of Stay (>5 days = 3)    Pt.  Coverage (Medicare=5 , Medicaid, or Self-Pay=4)       Expected Length of Stay 4d 21h   Actual Length of Stay 2

## 2017-10-13 NOTE — PROGRESS NOTES
[address]  [phone]      Cardiology Progress Note      10/13/2017 10:00AM    Admit Date: 10/11/2017    Admit Diagnosis:   Pulmonary edema  Acute respiratory failure with hypoxia (HCC)  Elevated troponin  Accelerated hypertension    Subjective:     Anish Salguero is s/p cardiac cath with no obstructive disease, and right heart cath with normal PA pressures/CO/CI. Diuresed 5L since admission. Breathing much improved this AM, CXray improving.      Visit Vitals    BP 98/63    Pulse 96    Temp 98.3 °F (36.8 °C)    Resp 20    Ht 5' 7\" (1.702 m)    Wt 89.4 kg (197 lb 1.5 oz)    SpO2 96%    BMI 30.87 kg/m2       Current Facility-Administered Medications   Medication Dose Route Frequency    albuterol-ipratropium (DUO-NEB) 2.5 MG-0.5 MG/3 ML  3 mL Nebulization Q4H PRN    furosemide (LASIX) tablet 40 mg  40 mg Oral BID    potassium chloride (K-DUR, KLOR-CON) SR tablet 40 mEq  40 mEq Oral DAILY    carvedilol (COREG) tablet 3.125 mg  3.125 mg Oral BID WITH MEALS    sodium chloride (NS) flush 5-10 mL  5-10 mL IntraVENous PRN    nitroglycerin (NITROBID) 2 % ointment 1 Inch  1 Inch Topical Q6H    sodium chloride (NS) flush 5-10 mL  5-10 mL IntraVENous Q8H    sodium chloride (NS) flush 5-10 mL  5-10 mL IntraVENous PRN    acetaminophen (TYLENOL) tablet 650 mg  650 mg Oral Q6H PRN    ondansetron (ZOFRAN) injection 4 mg  4 mg IntraVENous Q6H PRN    docusate sodium (COLACE) capsule 100 mg  100 mg Oral BID    aspirin (ASPIRIN) tablet 325 mg  325 mg Oral DAILY    oxyCODONE IR (ROXICODONE) tablet 5-10 mg  5-10 mg Oral Q4H PRN    allopurinol (ZYLOPRIM) tablet 300 mg  300 mg Oral DAILY    fluticasone-vilanterol (BREO ELLIPTA) 100mcg-25mcg/puff  1 Puff Inhalation DAILY    busPIRone (BUSPAR) tablet 15 mg  15 mg Oral TID    citalopram (CELEXA) tablet 40 mg  40 mg Oral DAILY    umeclidinium (INCRUSE ELLIPTA) 62.5 mcg/actuation  1 Puff Inhalation DAILY    morphine injection 1 mg  1 mg IntraVENous Q4H PRN    LORazepam (ATIVAN) injection 1 mg  1 mg IntraVENous Q4H PRN    pantoprazole (PROTONIX) tablet 40 mg  40 mg Oral ACB    valsartan (DIOVAN) tablet 160 mg  160 mg Oral DAILY    atorvastatin (LIPITOR) tablet 20 mg  20 mg Oral DAILY       Objective:      Physical Exam:  General Appearance:  WNWD  male in no acute distress  Chest:   clear, slightly decreased on right base  Cardiovascular:  Regular rate and rhythm, no murmur.   Abdomen:   Soft, non-tender, bowel sounds are active.   Extremities: L LE edema +1, right trace  Skin:  Warm and dry.     Data Review:   Recent Labs      10/12/17   0304  10/11/17   0743   WBC  9.6  14.2*   HGB  9.6*  10.6*   HCT  28.2*  32.2*   PLT  624*  653*     Recent Labs      10/13/17   0325  10/12/17   0304  10/11/17   0743   NA  129*  131*  135*   K  3.3*  3.3*  4.1   CL  92*  93*  97   CO2  29  25  28   GLU  112*  149*  143*   BUN  19  17  13   CREA  0.61*  0.56*  0.71   CA  8.5  8.6  9.0   MG   --    --   2.1   ALB   --    --   3.2*   TBILI   --    --   0.3   SGOT   --    --   18   ALT   --    --   25       Recent Labs      10/11/17   1605  10/11/17   1157  10/11/17   0743   TROIQ  0.66*  0.39*  0.08*   CPK   --   83  74   CKMB   --    --   2.7         Intake/Output Summary (Last 24 hours) at 10/13/17 1309  Last data filed at 10/13/17 0843   Gross per 24 hour   Intake                0 ml   Output             2900 ml   Net            -2900 ml        Telemetry: SR    Cxray: Mild interstitial edema, with slight interval improvement    Assessment:     Principal Problem:    Acute respiratory failure with hypoxia (HCC) (10/11/2017)    Active Problems:    Pulmonary edema (10/11/2017)      Accelerated hypertension (10/11/2017)      Elevated troponin (10/11/2017)      COPD (chronic obstructive pulmonary disease) ([facility] (10/11/2017)      Cardiomyopathy ([facility] (10/11/2017)      Acute systolic congestive heart failure, NYHA class 4 ([facility] (10/11/2017)      PAT (paroxysmal atrial tachycardia) ([facility] (10/12/2017)      Overview: Brief on tele 10/12/17        Plan:     Acute Hypoxic Respiratory Failure r/t acute pulmonary edema in the setting of New onset HFrEF, EF 15-25%/Dilated CM  -  Not ischemic, etiology of severely reduced EF:  ETOH vs Viral vs Takotsubo  -  His nonischemic cardiomyopathy is now optimally compensated by right heart catheterization numbers.  -  Continue diuresis, change to PO Lasix 40mg BID. At discharge continue on Lasix 40mg BID x 1 week, then will f/u with Dr. Joni Kimball (appt has been made)  -  repeat CXR with slight improvement of pulm edema  -  Strict Is and Os, daily weights, daily BMP  PLEASE RECORD INPUT - 1500CC RESTRICTION  -  Continue Carvedilol, ARB  -  Life Vest has been ordered     Paroxysmal Atrial tachycardia- very brief run  -  stable, continue on BB      Severe COPD (FEV1 33%)  -  Management per pulmonary team    Hyponatremia:  Good diuresis, but continues trending down  Strict input and outputs  Other etiology? Anemia:  On admission 10.6, 2gm drop from prior to hip surgery  Now 9.6  No c/o bleeding. Monitor as outpt. Kary Good ACNP  Cardiology    Patient is stable for discharge from a cardiac standpoint today once LifeVest is placed. Follow-up with me or a nurse practitioner in my office within 1 week. Thanks for the consult. We appreciate the opportunity to proceed in this patient's care.

## 2017-10-13 NOTE — DISCHARGE INSTRUCTIONS
HOSPITALIST DISCHARGE INSTRUCTIONS    NAME: Lopez Espinosa   :  1950   MRN:  [id number]     Date/Time:  10/13/2017 6:35 PM    ADMIT DATE: 10/11/2017   DISCHARGE DATE: 10/13/2017         · It is important that you take the medication exactly as they are prescribed. · Keep your medication in the bottles provided by the pharmacist and keep a list of the medication names, dosages, and times to be taken in your wallet. · Do not take other medications without consulting your doctor. What to do at [address] [facility]:  Cardiac Diet    Recommended activity: Activity as tolerated      If you have questions regarding the hospital related prescriptions or hospital related issues please call [facility] at [phone]. You can always direct your questions to your primary care doctor if you are unable to reach your hospital physician; your PCP works as an extension of your hospital doctor just like your hospital doctor is an extension of your PCP for your time at the hospital [facility], [facility])    If you experience any of the following symptoms then please call your primary care physician or return to the emergency room if you cannot get hold of your doctor:    Fever, chills, nausea, vomiting, or persistent diarrhea  Worsening weakness or new problems with your speech or balance  Dark stools or visible blood in your stools  New Leg swelling or shortness of breath as this could be signs of a clot    Additional Instructions:      Bring these papers with you to your follow up appointments. The papers will help your doctors be sure to continue the care plan from the hospital.              Information obtained by :  I understand that if any problems occur once I am at home I am to contact my physician. I understand and acknowledge receipt of the instructions indicated above. [de-identified] or R.N.'s Signature                                                                  Date/Time                                                                                                                                              Patient or Representative Signature                                                  Admission Diagnoses: Pulmonary edema  Acute respiratory failure with hypoxia (HCC)  Elevated troponin  Accelerated hypertension    Discharge Diagnoses: Principal Problem:    Acute respiratory failure with hypoxia ([facility] (10/11/2017)    Active Problems:    Pulmonary edema (10/11/2017)      Accelerated hypertension (10/11/2017)      Elevated troponin (10/11/2017)      COPD (chronic obstructive pulmonary disease) ([email or web address] (10/11/2017)      Cardiomyopathy ([facility] (10/11/2017)      Acute systolic congestive heart failure, NYHA class 4 ([email or web address] (10/11/2017)      PAT (paroxysmal atrial tachycardia) ([facility] (10/12/2017)      Overview: Brief on tele 10/12/17      Hyponatremia (10/13/2017)      Anemia (10/13/2017)      Heart Failure: After Your Visit  Your Care Instructions  Heart failure occurs when your heart does not pump as much blood as the body needs. Failure does not mean that the heart has stopped pumping but rather that it is not pumping as well as it should. Over time, this causes fluid buildup in your lungs and other parts of your body. Fluid buildup can cause shortness of breath, fatigue, swollen ankles, and other problems. By taking medicines regularly, reducing sodium (salt) in your diet, checking your weight every day, and making lifestyle changes, you can feel better and live longer. Follow-up care is a key part of your treatment and safety. Be sure to make and go to all appointments, and call your doctor if you are having problems. It's also a good idea to know your test results and keep a list of the medicines you take.   How can you care for yourself at home?  Medicines  · Take your medicines exactly as prescribed. Call your doctor if you think you are having a problem with your medicine. · Do not take any vitamins, over-the-counter medicine, or herbal products without talking to your doctor first. Shola Esteban not take ibuprofen (Advil or Motrin) and naproxen (Aleve) without talking to your doctor first. They could make your heart failure worse. · You may be taking some of the following medicine. ¨ Beta-blockers can slow heart rate, decrease blood pressure, and improve your condition. Taking a beta-blocker may lower your chance of needing to be hospitalized. ¨ Angiotensin-converting enzyme inhibitors (ACEIs) reduce the heart's workload, lower blood pressure, and reduce swelling. Taking an ACEI may lower your chance of needing to be hospitalized again. ¨ Angiotensin II receptor blockers (ARBs) work like ACEIs. Your doctor may prescribe them instead of ACEIs. ¨ Diuretics, also called water pills, reduce swelling. ¨ Potassium supplements replace this important mineral, which is sometimes lost with diuretics. ¨ Aspirin and other blood thinners prevent blood clots, which can cause a stroke or heart attack. You will get more details on the specific medicines your doctor prescribes. Diet  · Do not eat more than 2,000 milligrams (mg) of sodium each day. That is less than 1 teaspoon of salt a day, including all the salt you eat in cooking or in packaged foods. And try to lower your sodium to less than 1,500 milligrams a day if you are 46 or older, are black, or have high blood pressure, diabetes, or chronic kidney disease. People get most of their sodium from processed foods. Fast food and restaurant meals also tend to be very high in sodium. · Ask your doctor how much liquid you can drink each day. You may have to limit liquids. Weight  · Weigh yourself without clothing at the same time each day. Record your weight.  Call your doctor if you gain more than 3 pounds in 2 to 3 days. A sudden weight gain may mean that your heart failure is getting worse. Activity level  · Start light exercise (if your doctor says it is okay). Even if you can only do a small amount, exercise will help you get stronger, have more energy, and manage your weight and your stress. Walking is an easy way to get exercise. Start out by walking a little more than you did before. Bit by bit, increase the amount you walk. · When you exercise, watch for signs that your heart is working too hard. You are pushing yourself too hard if you cannot talk while you are exercising. If you become short of breath or dizzy or have chest pain, stop, sit down, and rest.  · If you feel \"wiped out\" the day after you exercise, walk slower or for a shorter distance until you can work up to a better pace. · Get enough rest at night. Sleeping with 1 or 2 pillows under your upper body and head may help you breathe easier. Lifestyle changes  · Do not smoke. Smoking can make a heart condition worse. If you need help quitting, talk to your doctor about stop-smoking programs and medicines. These can increase your chances of quitting for good. Quitting smoking may be the most important step you can take to protect your heart. · Limit alcohol to 2 drinks a day for men and 1 drink a day for women. Too much alcohol can cause health problems. · Limit coffee and soft drinks with caffeine. Caffeine can raise your heart rate and cause changes in your heartbeat. · Avoid getting sick from colds and the flu. Get a pneumococcal vaccine shot. If you have had one before, ask your doctor whether you need a second dose. Get a flu shot each fall. If you must be around people with colds or the flu, wash your hands often. When should you call for help? Call 911 if you have symptoms of sudden heart failure such as:  · You have severe trouble breathing. · You cough up pink, foamy mucus. · You have a new irregular or rapid heartbeat.   Call your doctor now or seek immediate medical care if:  · You have new or increased shortness of breath. · You are dizzy or lightheaded, or you feel like you may faint. · You have sudden weight gain, such as 3 pounds or more in 2 to 3 days. · You have increased swelling in your legs, ankles, or feet. · You are suddenly so tired or weak that you cannot do your usual activities. Watch closely for changes in your health, and be sure to contact your doctor if:  · You develop new symptoms. Where can you learn more? Go to [employer]  Enter U353 in the search box to learn more about \"Heart Failure: After Your Visit. \"   © 4643-0799 Healthwise, Incorporated. Care instructions adapted under license by [facility] [employer] (which disclaims liability or warranty for this information). This care instruction is for use with your licensed healthcare professional. If you have questions about a medical condition or this instruction, always ask your healthcare professional. [place] [address] any warranty or liability for your use of this information.   Content Version: 0.3.09169; Last Revised: May 27, 2011

## 2017-10-13 NOTE — PROGRESS NOTES
[facility] Pulmonary Consult Service Note  Pulmonary, Critical Care, and Sleep Medicine    Name: Adonis Avalos MRN: [id number]   : 1950 Hospital: [address]   Date: 10/13/2017   Hospital Day: 3       Subjective/Interval History:   I have reviewed the flowsheet and previous days notes. Seen earlier today on rounds. 10/13 off bIPAP. On 3 LPM. Does not use nebulizer at home. Has minimal cough, minimal sputum    IMPRESSION:   1. Acute on chronic respiratory failure with hypoxia, POA; O2 at bedside  2. Severe COPD FEV1 1.07 Liters (33%) followed by Dr. Damian White but obvious sign of acute exacerbation but has no reserve; not steroid dependent; This year went to [facility] for stem cells x 2 and truly believes it helps but understands he has other medical problems contributing to his dyspnea now; explained pathophysiology of limited capacity and that he will benefit from outp Cardiac and  Pulmonary rehab when cleared by Cardiology; sedetary and deconditioned  3. CHF echo LVEF 15-25%; An echo was preformed and was significant for EF of 15-25%, severe LV hypokinesis, dilation of both atria and grade II DD. from ETOH or Ischemic heart disease? ? Quit drinking months ago  4. Acute pulmonary edema with new onset chf, POA CT chest interstitial edema and peripheral edema  5. Accelerated hypertension, report HCTZ was discontinued prior to hip surgery due to low sodium. 6. Elevated troponin 0.08; worrisome for NSTEMI, POA- agree with cardiology evaluation  7. Weight loss about 40 pounds this past year- may have helped his breathing as much as his stem cell treatments and inhalers   8. Generalized anxiety disorder  9. S/p left THR on 17. Dr. Claudia Rangel  10. Leg swelling; left from surgery- Dopplers 10/11 negative DVT  11. H/o heavy ETOH use- none for months; telangiectasias on exam  12. Chronic marijuana abuse      RECOMMENDATIONS/PLAN:   1.  BIPAP prn  2. cardaic cath per   3. Monitor I/o, daily weight. 4. Adjust nebs   5. off steroids and doxycycline   6. Cardiac and Pulmonary rehab when cleared by Cardiology  7. Will check Monday or sooner prn     Patient PCP: Candy Roy MD   Pulm:  Dr. Emilie Hdz  Surrogate decision maker:  Wife Lorraine Brennan cell [phone]      Code: Full Code      Risk of deterioration: medium and high   [x] High complexity decision making was performed    Subjective/Initial History:   I have reviewed the flowsheet and previous days notes. I was asked by Jenelle Lozano MD to see Regan Necessary in consultation for a chief complaint of respiratory failure and COPD . Regan Necessary is a 61 y.o.  male with severe COPD on 2L O2 at night x 2 years, s/p left hip replacement surgery on 9/21 who presents with respiratory distress. History from wife and patient. Did well after surgery, has stable left leg swelling since surgery. Over past weekend, noticed onset of mild SOB and wheezing and called PCP who started him on prednisone (he took first dose this AM). Two days ago, developed swelling in right lower leg. Today woke up around 6am with SOB that became much worse after walking to bathroom, had to sit for several minutes on commode to try and catch breath, walked back to bed and asked wife to call EMS. Has developed mild cough today, nonproductive. No fever or chills. SOB worse with exertion or even talking. Better at rest, associated with chest tightness 8/10 across chest, +nausea. In ER, CXR with new diffuse pulmonary edema. CTA chest negative PE but has changes suggestive of CHF I.e new bilateral interstitial and alveolar opacities. Denies hx of chf or MI. Did not have stress test prior to surgery. Per wife, pt had left total hip arthroplasty (9/21/17), and has had mild swelling over site of procedure, but has otherwise been healing well; she states pt had follow up with Dr. Nidhi Dominguez ~1 week ago.  Pt's wife notes pt has had left lower leg swelling since procedure, which she states is slightly better than baseline this morning, as pt wore compression socks overnight. Pt sees Dr. Lamont Storey and last visit August 2017. She states pt is not currently taking anticoagulants. Per wife, pt has never been admitted for hx of COPD, and has never been intubated. Not on chronic steroids. She denies pt has hx of DVT/PE. Pt is on 2 L O2 NC at night at baseline. Pt placed on BIPAP after returning from Ct scan for increased WOB. Feeling better after dose of lasix, morphine and some time on BIPAP. D/w Dr. Marva Meigs.     Allergies   Allergen Reactions    Adhesive Tape-Silicones Other (comments)     Pulls skin off    Sulfa (Sulfonamide Antibiotics) Shortness of Breath        MAR reviewed and pertinent medications noted or modified as needed     Current Facility-Administered Medications   Medication    iopamidol (ISOVUE-370) 76 % injection    fentaNYL citrate (PF) injection 25-50 mcg    iopamidol (ISOVUE-370) 76 % injection 0-150 mL    lidocaine (PF) (XYLOCAINE) 10 mg/mL (1 %) injection 1-30 mL    midazolam (VERSED) injection 0.5-2 mg    albuterol-ipratropium (DUO-NEB) 2.5 MG-0.5 MG/3 ML    furosemide (LASIX) injection 40 mg    potassium chloride (K-DUR, KLOR-CON) SR tablet 40 mEq    carvedilol (COREG) tablet 3.125 mg    sodium chloride (NS) flush 5-10 mL    nitroglycerin (NITROBID) 2 % ointment 1 Inch    sodium chloride (NS) flush 5-10 mL    sodium chloride (NS) flush 5-10 mL    acetaminophen (TYLENOL) tablet 650 mg    ondansetron (ZOFRAN) injection 4 mg    docusate sodium (COLACE) capsule 100 mg    enoxaparin (LOVENOX) injection 40 mg    aspirin (ASPIRIN) tablet 325 mg    oxyCODONE IR (ROXICODONE) tablet 5-10 mg    allopurinol (ZYLOPRIM) tablet 300 mg    fluticasone-vilanterol (BREO ELLIPTA) 100mcg-25mcg/puff    busPIRone (BUSPAR) tablet 15 mg    citalopram (CELEXA) tablet 40 mg    umeclidinium (INCRUSE ELLIPTA) 62.5 mcg/actuation    morphine injection 1 mg    LORazepam (ATIVAN) injection 1 mg    pantoprazole (PROTONIX) tablet 40 mg    valsartan (DIOVAN) tablet 160 mg    atorvastatin (LIPITOR) tablet 20 mg        PMH:  has a past medical history of Arthritis; Chronic obstructive pulmonary disease ([address] Chronic pain; GERD (gastroesophageal reflux disease); Hypertension; Ill-defined condition; Psychiatric disorder; and Psychiatric disorder. He also has no past medical history of Adverse effect of anesthesia; Difficult intubation; Malignant hyperthermia due to anesthesia; Nausea & vomiting; or Pseudocholinesterase deficiency. PSH:   has a past surgical history that includes colectomy (); tonsillectomy; knee scope,med or lat menis repair (Left); other surgical (Left); and other surgical.   FHX: family history includes Arthritis-osteo in his mother; Cancer in his brother, father, and mother. SHX:  reports that he quit smoking about 24 years ago. He has a 72.50 pack-year smoking history. He has never used smokeless tobacco. He reports that he does not drink alcohol or use illicit drugs. ROS:A comprehensive review of systems was negative except for that written in the HPI.     Telemetry:    normal sinus rhythm    Objective:     Vital Signs: Intake/Output: Intake/Output:   Visit Vitals    /71 (BP Patient Position: At rest)    Pulse 96    Temp 98.3 °F (36.8 °C)    Resp 22    Ht 5' 7\" (1.702 m)    Wt 89.4 kg (197 lb 1.5 oz)    SpO2 96%    BMI 30.87 kg/m2           O2 Device: Nasal cannula  O2 Flow Rate (L/min): 3 l/min  Wt Readings from Last 4 Encounters:   10/13/17 89.4 kg (197 lb 1.5 oz)   17 89.7 kg (197 lb 12 oz)   17 90.1 kg (198 lb 9.6 oz)   16 106.1 kg (233 lb 14.5 oz)       Temp (24hrs), Av.2 °F (36.8 °C), Min:97.8 °F (36.6 °C), Max:98.7 °F (37.1 °C)     Intake/Output Summary (Last 24 hours) at 10/13/17 0914  Last data filed at 10/13/17 0843   Gross per 24 hour   Intake 0 ml   Output             2900 ml   Net            -2900 ml     Last shift:      10/13 0701 - 10/13 1900  In: 0   Out: 300 [Urine:300]  Last 3 shifts: 10/11 1901 - 10/13 0700  In: 480 [P.O.:480]  Out: 4451 [Urine:4450]     Exam:    Physical Exam:    General: WM alert, cooperative and severely ill   HEENT: NCAT, No Thrush; class 4 airway   Neck: No abnormally enlarged lymph nodes. Chest: increased AP diameter   Lungs: distant BS; no wheeze. No rhonchi   Heart: Regular rate and rhythm   Abdomen: soft, non-tender, without masses or organomegaly, protuberant, noparadox   :    Extremity: negative, cyanosis, clubbing 2-3+ bilateral lower extremity edema to the level of the knees   Neuro: alert   Psych: Alert and Oriented x 2   Skin: Pallor;    Pulses:Bilateral, Radial, 2+   Capillary refill: normal well perfused,    Data:     Interval lab and diagnostic data was reviewed. Interval radiology images were independently viewed and available reports were reviewed. All lab results for the last 24 hours reviewed.              Labs:    Recent Labs      10/12/17   0304  10/11/17   0743   WBC  9.6  14.2*   HGB  9.6*  10.6*   PLT  624*  653*     Recent Labs      10/13/17   0325  10/12/17   0304  10/11/17   1157  10/11/17   0804  10/11/17   0743   NA  129*  131*   --    --   135*   K  3.3*  3.3*   --    --   4.1   CL  92*  93*   --    --   97   CO2  29  25   --    --   28   GLU  112*  149*   --    --   143*   BUN  19  17   --    --   13   CREA  0.61*  0.56*   --    --   0.71   CA  8.5  8.6   --    --   9.0   MG   --    --    --    --   2.1   LAC   --    --   1.5  2.4*   --    ALB   --    --    --    --   3.2*   SGOT   --    --    --    --   18   ALT   --    --    --    --   25     Recent Labs      10/11/17   0743   PH  7.40   PCO2  45   PO2  81   HCO3  27*     Recent Labs      10/11/17   1605  10/11/17   1157  10/11/17   0743   CPK   --   83  74   CKNDX   --    --   3.6*   TROIQ  0.66*  0.39*  0.08*     No results found for: BNPP, BNP   Lab Results   Component Value Date/Time    Culture result: NO GROWTH 2 DAYS 10/11/2017 08:04 AM    Culture result: NO GROWTH 2 DAYS 10/11/2017 07:43 AM    Culture result: MRSA NOT PRESENT 09/12/2017 10:49 AM    Culture result:  09/12/2017 10:49 AM         Screening of patient nares for MRSA is for surveillance purposes and, if positive, to facilitate isolation considerations in high risk settings. It is not intended for automatic decolonization interventions per se as regimens are not sufficiently effective to warrant routine use. Lab Results   Component Value Date/Time    CK 83 10/11/2017 11:57 AM    CK 74 10/11/2017 07:43 AM     Lab Results   Component Value Date/Time    Color YELLOW/STRAW 10/11/2017 10:29 AM    Appearance CLEAR 10/11/2017 10:29 AM    pH (UA) 6.0 10/11/2017 10:29 AM    Protein NEGATIVE  10/11/2017 10:29 AM    Glucose NEGATIVE  10/11/2017 10:29 AM    Ketone 15 10/11/2017 10:29 AM    Bilirubin NEGATIVE  10/11/2017 10:29 AM    Blood NEGATIVE  10/11/2017 10:29 AM    Urobilinogen 0.2 10/11/2017 10:29 AM    Nitrites NEGATIVE  10/11/2017 10:29 AM    Leukocyte Esterase NEGATIVE  10/11/2017 10:29 AM    WBC 0-4 09/12/2017 10:49 AM    RBC 0-5 09/12/2017 10:49 AM    Bacteria NEGATIVE  09/12/2017 10:49 AM         Imaging:  I have personally reviewed the patients radiographs and have reviewed the reports:          Results from Hospital Encounter encounter on 10/11/17   XR CHEST PORT   Narrative EXAM:  XR CHEST PORT    INDICATION:  Pulmonary edema    COMPARISON:  Prior day    FINDINGS: A portable AP radiograph of the chest was obtained at 0816 hours. The  patient is on a cardiac monitor. There is mild interstitial edema, with slight  interval improvement of the right base. The cardiac and mediastinal contours  are normal.  The bones and soft tissues are grossly within normal limits.           Impression IMPRESSION: Mild interstitial edema, with slight interval improvement. Results from [place] encounter on 10/11/17   CTA CHEST W OR W WO CONT   Narrative CT ANGIOGRAPHY CHEST. 10/11/2017 8:34 AM     INDICATION: Dyspnea, respiratory distress, left total hip replacement on  9/22/2017. COMPARISON: None. TECHNIQUE: CT angiography of the chest was performed after the administration of  100 cc IV contrast (Isovue 370). Coronal and sagittal, and coronal MIP  reconstructions were performed. CT dose reduction was achieved through use of a  standardized protocol tailored for this examination and automatic exposure  control for dose modulation. FINDINGS:  There is no pulmonary embolism. Interlobular septal thickening is consistent  with interstitial edema in the setting of trace bilateral pleural effusions. Scattered groundglass opacity indicates minimal alveolar edema. Centrilobular  emphysema is mild to moderate. Incidental note is made of scarring inferior to  the right sternoclavicular joint in the right upper lobe apical segment. The  left ventricle is mildly enlarged. Mitral annular, aortic valvular, and left  anterior descending coronary calcifications are mild. Reflux of contrast into  into the hepatic veins suggests right heart dysfunction. No thoracic  lymphadenopathy. There is trace endotracheal debris; the central airways are  otherwise patent. Bronchial wall thickening is further evidence of interstitial  edema. No pneumothorax. No thoracic lymphadenopathy. The visualized upper  abdomen is otherwise normal.         Impression IMPRESSION:   1. CHF: Interstitial and minimal alveolar edema, trace bilateral pleural  effusions, left ventricular enlargement. 2. No PE.              My assessment/plan was discussed with:  nursing    respiratory therapy Dr. roth      Complex decision making was performed which includes reviewing the patient's past medical records, current laboratory results, medications, ECG and actual Xray films, immediately available at the bedside to the patient    Thank you for allowing us to participate in the care of this patient. We will be happy to follow with you.     Jazmin Martin MD

## 2017-10-13 NOTE — DISCHARGE SUMMARY
Hospitalist Discharge Summary     Patient ID:  Harper Concepcion  [id number]  61 y.o.  1950    PCP on record: Gui Patiño MD    Admit date: 10/11/2017  Discharge date and time: 10/13/2017      DISCHARGE DIAGNOSIS:    Acute on chronic respiratory failure with hypoxia, POA  Acute pulmonary edema with new onset chf, POA  Non Ischemic Cardiomyopathy  S/P Cardiac Cath EF is 25-30%  Accelerated hypertension,  SIRS due to above, meet severe sepsis criteria  COPD with chronic respiratory failure on 2L O2 at night only at baseline.    Generalized anxiety disorder  S/p left LELO on 9/21/17.    Chronic marijuana abuse and ETOH USE last was in March          Body mass index is 31.39 kg/(m^2). CONSULTATIONS:  IP CONSULT TO CARDIOLOGY  IP CONSULT TO PULMONOLOGY    Excerpted HPI from H&P of Cosme Bates MD:  Harper Concepcion is a 61 y.o.  male with COPD on 2L O2 at night x 2 years, s/p left hip replacement surgery on 9/21 who presents with respiratory distress. History from wife and patient. Did well after surgery, has stable left leg swelling since surgery. Over past weekend, noticed onset of mild SOB and wheezing and called PCP who started him on prednisone (he took first dose this AM). Two days ago, developed swelling in right lower leg. Today woke up around 6am with SOB that became much worse after walking to bathroom, had to sit for several minutes on commode to try and catch breath, walked back to bed and asked wife to call EMS. Has developed mild cough today, nonproductive. No fever or chills. SOB worse with exertion or even talking. Better at rest, associated with chest tightness 8/10 across chest, +nausea.     In ER, CXR with new diffuse pulmonary edema. CTA chest negative PE but has changes suggestive of CHF I.e new bilateral interstitial and alveolar opacities. Denies hx of chf or MI.   Did not have stress test prior to surgery.     We were asked to admit for work up and evaluation of the above problems. ______________________________________________________________________  DISCHARGE SUMMARY/HOSPITAL COURSE:  for full details see H&P, daily progress notes, labs, consult notes. Acute on chronic respiratory failure with hypoxia, POA  Acute pulmonary edema with new onset chf, POA  NON Ischemic Cardiomyopahty  For Cardiac cath  Cardiac Cath shows: --  Global left ventricular function was severely depressed. Ejection  fraction was estimated in the range of 25 % to 30 %. --  The left ventricle was mildly dilated. No Coronary Disease  Accelerated hypertension, report HCTZ was discontinued prior to hip surgery due to low sodium. Elevated troponin 0.08 with chest tightness worrisome for NSTEMI, POA  SIRS due to above, meet severe sepsis criteria    --diurese with lasix (give additional 60mg for total 80mg today), then 40mg IV daily.  Monitor I/o, daily weight.   --aspirin 324mg now and then resume daily.  Start nitroglycerin paste, morphine.  Cardiology consult, discussed with Dr. Klever Miller.  Serial cardiac enzymex  --put on duoneb q4h, IV solumedrol, doxycycline for possible copd exacerbation but feel dyspnea is primarily driven by cardiac problems.  Continue with bipap.  Pulmonology consult  PAtient is Discharged on Medical Management: See below for Med List. He will be followed by Dr. Samuel Caldwell as outpatient        COPD with chronic respiratory failure on 2L O2 at night only at baseline.          Received Stem Cell therapy     Generalized anxiety disorder     S/p left LELO on 9/21/17. Louann Cosme DVT prophylaxis from aspirin BID to ASA daily + lovenox while in hospital  Needs PT in hospital when Cardiac issues are better     Chronic marijuana abuse and ETOH USE last was in March            Body mass index is 31.32 kg/(m^2).       Code: full.  Has advance directive on file for no heroic measures if has terminal condition  DVT prophylaxis: lovenox  Surrogate decision maker: Kelsey Burrows cell [id number]        Body mass index is 31.39 kg/(m^2). _______________________________________________________________________  Patient seen and examined by me on discharge day. Pertinent Findings:  Gen:    Not in distress  Chest: Clear lungs  CVS:   Regular rhythm. No edema  Abd:  Soft, not distended, not tender  Neuro:  Alert, ox3  _______________________________________________________________________  DISCHARGE MEDICATIONS:   Current Discharge Medication List      START taking these medications    Details   atorvastatin (LIPITOR) 20 mg tablet Take 1 Tab by mouth daily. Refills by Dr. Meyer Areas: 30 Tab, Refills: 0      carvedilol (COREG) 3.125 mg tablet Take 1 Tab by mouth two (2) times daily (with meals). Refills by  Dr. Meyer Areas: 60 Tab, Refills: 0      pantoprazole (PROTONIX) 40 mg tablet Take 1 Tab by mouth Daily (before breakfast). Qty: 30 Tab, Refills: 0      furosemide (LASIX) 40 mg tablet 1 pill BID    Refills by Dr. Breezy Andrade  Indications: Pulmonary Edema due to Chronic Heart Failure  Qty: 60 Tab, Refills: 1      potassium chloride (K-DUR, KLOR-CON) 20 mEq tablet Take 2 Tabs by mouth two (2) times a day. Qty: 60 Tab, Refills: 0    Comments: Refills by Dr. Alberta Noonan these medications which have NOT CHANGED    Details   valsartan (DIOVAN) 160 mg tablet Take 160 mg by mouth daily. predniSONE (DELTASONE) 20 mg tablet Take 20 mg by mouth daily (with breakfast). ketoconazole (NIZORAL) 2 % shampoo Apply  to affected area daily as needed for Itching. Apply to scalp and face up to three times a week as needed for flaky skin      oxyCODONE-acetaminophen (PERCOCET 10)  mg per tablet Take 1 Tab by mouth three (3) times daily as needed for Pain (Back pain). loratadine (CLARITIN) 10 mg tablet Take 10 mg by mouth. diclofenac EC (VOLTAREN) 75 mg EC tablet Take 75 mg by mouth two (2) times a day.       aspirin delayed-release 325 mg tablet Take 1 Tab by mouth two (2) times a day for 30 days. Qty: 60 Tab, Refills: 0      allopurinol (ZYLOPRIM) 300 mg tablet Take 300 mg by mouth daily. omeprazole (PRILOSEC) 20 mg capsule Take 20 mg by mouth daily. busPIRone (BUSPAR) 15 mg tablet Take 15 mg by mouth three (3) times daily. citalopram (CELEXA) 40 mg tablet Take 40 mg by mouth daily. budesonide-formoterol (SYMBICORT) 160-4.5 mcg/actuation HFA inhaler Take 2 Puffs by inhalation two (2) times daily as needed (Shortness of Breath). tiotropium (SPIRIVA) 18 mcg inhalation capsule Take 1 Cap by inhalation daily. albuterol (PROVENTIL HFA, VENTOLIN HFA, PROAIR HFA) 90 mcg/actuation inhaler Take 2 Puffs by inhalation daily as needed for Wheezing or Shortness of Breath. testosterone cypionate (DEPOTESTOTERONE CYPIONATE) 200 mg/mL injection 200 mg by IntraMUSCular route every fourteen (14) days. Qty: 0 Vial, Refills: 0             My Recommended Diet, Activity, Wound Care, and follow-up labs are listed in the patient's Discharge Insturctions which I have personally completed and reviewed.     _______________________________________________________________________  DISPOSITION:    Home with Family: y   Home with HH/PT/OT/RN:    SNF/LTC:    MARIANNE:    OTHER:        Condition at Discharge:  Stable  _______________________________________________________________________  Follow up with:   PCP : Antonio Silva MD  Follow-up Information     Follow up With Details Comments Contact Info    Titus Scheuermann, NP On 10/18/2017 at 2:30PM at [place] [address], [address] [facility]  [phone]      Alan Barreto MD Schedule an appointment as soon as possible for a visit in 1 week  [id number] [facility]  [phone]                Total time in minutes spent coordinating this discharge (includes going over instructions, follow-up, prescriptions, and preparing report for sign off to her PCP) :  >30 minutes    Signed:  Claudio Nagel MD

## 2017-10-14 PROBLEM — I42.8 NICM (NONISCHEMIC CARDIOMYOPATHY) (HCC): Status: ACTIVE | Noted: 2017-10-11

## 2017-10-14 NOTE — PROGRESS NOTES
Discharge instructions reviewed with patient and family. Allowed adequate time to ask questions, all questions answered. Printed copy of AVS & prescriptions given to patient. All belongings gathered, IV and tele discontinued. Transported via wheelchair by volunteer/PCT to main entrance and into care of family.

## 2017-10-17 ENCOUNTER — PATIENT OUTREACH (OUTPATIENT)
Dept: CARDIOLOGY CLINIC | Age: 67
End: 2017-10-17

## 2017-10-17 LAB
BACTERIA SPEC CULT: NORMAL
BACTERIA SPEC CULT: NORMAL
SERVICE CMNT-IMP: NORMAL
SERVICE CMNT-IMP: NORMAL

## 2017-10-17 NOTE — PROGRESS NOTES
This note will not be viewable in [address] Called pt to follow up on hospital visit. LVM which included my name, NN at [facility], date and time of call, and direct office telephone number. Pt returned my call and LVM    10/18/17  Called pt and will meet him in the office at 230 appt.

## 2017-10-18 ENCOUNTER — OFFICE VISIT (OUTPATIENT)
Dept: CARDIOLOGY CLINIC | Age: 67
End: 2017-10-18

## 2017-10-18 VITALS
WEIGHT: 189.3 LBS | BODY MASS INDEX: 29.71 KG/M2 | RESPIRATION RATE: 16 BRPM | DIASTOLIC BLOOD PRESSURE: 62 MMHG | HEIGHT: 67 IN | HEART RATE: 82 BPM | OXYGEN SATURATION: 95 % | SYSTOLIC BLOOD PRESSURE: 100 MMHG

## 2017-10-18 DIAGNOSIS — I10 ACCELERATED HYPERTENSION: Primary | ICD-10-CM

## 2017-10-18 DIAGNOSIS — I42.8 NICM (NONISCHEMIC CARDIOMYOPATHY) (HCC): ICD-10-CM

## 2017-10-18 DIAGNOSIS — J44.9 CHRONIC OBSTRUCTIVE PULMONARY DISEASE, UNSPECIFIED COPD TYPE (HCC): ICD-10-CM

## 2017-10-18 RX ORDER — FUROSEMIDE 40 MG/1
TABLET ORAL
Qty: 60 TAB | Refills: 1 | Status: SHIPPED | OUTPATIENT
Start: 2017-10-18 | End: 2017-10-19 | Stop reason: SDUPTHER

## 2017-10-18 RX ORDER — CARVEDILOL 6.25 MG/1
6.25 TABLET ORAL 2 TIMES DAILY WITH MEALS
Qty: 60 TAB | Refills: 2 | Status: SHIPPED | OUTPATIENT
Start: 2017-10-18 | End: 2017-10-19 | Stop reason: SDUPTHER

## 2017-10-18 NOTE — PROGRESS NOTES
Aaron Rouse DNP, [facility]  Subjective/HPI:     Minh Vazquez is a 61 y.o. male is here for follow-up for admission secondary to nonischemic cardiomyopathy ejection fraction initially 2025%, on coronary angiography showing normal coronary arteries his ejection fraction was 3035%. ECHO  SUMMARY:  Left ventricle: The ventricle was moderately dilated. Systolic function  was severely reduced. Ejection fraction was estimated in the range of 15 %  to 25 %. There was severe diffuse hypokinesis. Features were consistent  with a pseudonormal left ventricular filling pattern, with concomitant  abnormal relaxation and increased filling pressure (grade 2 diastolic  dysfunction). Right ventricle: The ventricle was mildly to moderately dilated. Systolic  function was mildly reduced. Left atrium: The atrium was severely dilated. Right atrium: The atrium was mildly dilated. Mitral valve: There was mild to moderate annular calcification. There was  moderate to severe regurgitation. Aortic valve: The valve was trileaflet. Leaflets exhibited mildly  increased thickness, mild calcification, and normal cuspal separation. Tricuspid valve: There was moderate regurgitation. There was moderate  pulmonary hypertension. SUMMARY:    --  HEMODYNAMICS:  --  Hemodynamic assessment demonstrates mildly to moderately elevated  LVEDP, normal cardiac output, and normal pulmonary capillary wedge  pressure. -- Bonita Pica is borderline pulmonary hypertension, due to left  ventricular systolic dysfunction. --  CARDIAC STRUCTURES:  --  Global left ventricular function was severely depressed. Ejection  fraction was estimated in the range of 25 % to 30 %. --  The left ventricle was mildly dilated. --  CORONARY CIRCULATION:  --  Coronary angiography demonstrated minor luminal irregularities. --  DIAGNOSTIC PROCEDURES:  --  Right radial artery and brachial vein access.  The puncture site was  infiltrated with 2 % lidocaine. The vessel was accessed using the  Seldinger technique, a wire was threaded into the vessel, and a catheter  was advanced over the wire into the vessel. PCP Provider  Nannette Bumpers, MD  Past Medical History:   Diagnosis Date    Anemia 10/13/2017    Arthritis     Chronic obstructive pulmonary disease (HCC)     Chronic pain     back    GERD (gastroesophageal reflux disease)     Hypertension     Hyponatremia 10/13/2017    Ill-defined condition     Gout    Psychiatric disorder     Generalized Anxiety Disorder    Psychiatric disorder     ETOH abuse      Past Surgical History:   Procedure Laterality Date    HX COLECTOMY  2000    diverticulitis    HX OTHER SURGICAL Left     mastoidectomy and inner ear surgery- age  18   [facility] OTHER SURGICAL      investigational stem cell therapy with lung Keller for copd    HX TONSILLECTOMY      KNEE SCOPE,MED OR LAT MENIS REPAIR Left      Allergies   Allergen Reactions    Adhesive Tape-Silicones Other (comments)     Pulls skin off    Sulfa (Sulfonamide Antibiotics) Shortness of Breath      Family History   Problem Relation Age of Onset    Cancer Mother      lung    Arthritis-osteo Mother     Cancer Father      throat    Cancer Brother      prostate      Current Outpatient Prescriptions   Medication Sig    carvedilol (COREG) 6.25 mg tablet Take 1 Tab by mouth two (2) times daily (with meals).  furosemide (LASIX) 40 mg tablet 1.5 tabs daily. Take additional 1/2 tab if weight increases  Indications: Pulmonary Edema due to Chronic Heart Failure    atorvastatin (LIPITOR) 20 mg tablet Take 1 Tab by mouth daily. Refills by Dr. Richi Singh pantoprazole (PROTONIX) 40 mg tablet Take 1 Tab by mouth Daily (before breakfast).  potassium chloride (K-DUR, KLOR-CON) 20 mEq tablet Take 2 Tabs by mouth two (2) times a day.  valsartan (DIOVAN) 160 mg tablet Take 160 mg by mouth daily.     predniSONE (DELTASONE) 20 mg tablet Take 20 mg by mouth daily (with breakfast).  ketoconazole (NIZORAL) 2 % shampoo Apply  to affected area daily as needed for Itching. Apply to scalp and face up to three times a week as needed for flaky skin    oxyCODONE-acetaminophen (PERCOCET 10)  mg per tablet Take 1 Tab by mouth three (3) times daily as needed for Pain (Back pain).  loratadine (CLARITIN) 10 mg tablet Take 10 mg by mouth.  diclofenac EC (VOLTAREN) 75 mg EC tablet Take 75 mg by mouth two (2) times a day.  testosterone cypionate (DEPOTESTOTERONE CYPIONATE) 200 mg/mL injection 200 mg by IntraMUSCular route every fourteen (14) days.  aspirin delayed-release 325 mg tablet Take 1 Tab by mouth two (2) times a day for 30 days.  allopurinol (ZYLOPRIM) 300 mg tablet Take 300 mg by mouth daily.  omeprazole (PRILOSEC) 20 mg capsule Take 20 mg by mouth daily.  busPIRone (BUSPAR) 15 mg tablet Take 15 mg by mouth three (3) times daily.  citalopram (CELEXA) 40 mg tablet Take 40 mg by mouth daily.  budesonide-formoterol (SYMBICORT) 160-4.5 mcg/actuation HFA inhaler Take 2 Puffs by inhalation two (2) times daily as needed (Shortness of Breath).  tiotropium (SPIRIVA) 18 mcg inhalation capsule Take 1 Cap by inhalation daily.  albuterol (PROVENTIL HFA, VENTOLIN HFA, PROAIR HFA) 90 mcg/actuation inhaler Take 2 Puffs by inhalation daily as needed for Wheezing or Shortness of Breath. No current facility-administered medications for this visit. Vitals:    10/18/17 1440 10/18/17 1452   BP: 100/60 100/62   Pulse: 82    Resp: 16    SpO2: 95%    Weight: 189 lb 4.8 oz (85.9 kg)    Height: 5' 7\" (1.702 m)      Social History     Social History    Marital status:      Spouse name: N/A    Number of children: N/A    Years of education: N/A     Occupational History    Not on file.      Social History Main Topics    Smoking status: Former Smoker     Packs/day: 2.50     Years: 29.00     Quit date: 2/17/1993    Smokeless tobacco: Never Used    Alcohol use No      Comment: quit march 15 2017 reports was more than 21 beers  per week     Drug use: No    Sexual activity: Not on file     Other Topics Concern    Not on file     Social History Narrative       I have reviewed the nurses notes, vitals, problem list, allergy list, medical history, family, social history and medications. Review of Symptoms:    General: Pt denies excessive weight gain or loss. Pt is able to conduct ADL's  HEENT: Denies blurred vision, headaches, epistaxis and difficulty swallowing. Respiratory: Denies shortness of breath, + mildDOE, no wheezing or stridor. Sleeping with home O2  Cardiovascular: Denies precordial pain, palpitations, edema or PND  Gastrointestinal: Denies poor appetite, indigestion, abdominal pain or blood in stool  Musculoskeletal: Recovering from left hip surgery   Neurologic: Denies tremor, paresthesias, or sensory motor disturbance  Skin: Denies rash, itching or texture change. Physical Exam:      General: Well developed, in no acute distress, cooperative and alert  HEENT: No carotid bruits, no JVD, trach is midline. Neck Supple, PEERL, EOM intact. Heart:  Normal S1/S2 negative S3 or S4. Regular, no murmur, gallop or rub.   Respiratory: Clear bilaterally x 4, no wheezing or rales  Abdomen:   Soft, non-tender, no masses, bowel sounds are active.   Extremities:  No edema, normal cap refill, no cyanosis, atraumatic. Neuro: A&Ox3, speech clear, gait stable. Skin: Skin color is normal.  Bilateral forearm ecchymosis no skin tears.   Vascular: 2+ pulses symmetric in all extremities    Cardiographics    ECG: Sinus   Results for orders placed or performed during the hospital encounter of 10/11/17   EKG, 12 LEAD, INITIAL   Result Value Ref Range    Ventricular Rate 119 BPM    Atrial Rate 119 BPM    P-R Interval 176 ms    QRS Duration 96 ms    Q-T Interval 314 ms    QTC Calculation (Bezet) 441 ms    Calculated P Axis 6 degrees    Calculated R Axis 24 degrees Calculated T Axis -14 degrees    Diagnosis       Sinus tachycardia  When compared with ECG of 12-SEP-2017 10:07,  Vent. rate has increased BY  57 BPM  T wave inversion now evident in Inferior leads  Confirmed by Aurelio Quezada ([id number]) on 10/11/2017 12:05:29 PM           Cardiology Labs:  Lab Results   Component Value Date/Time    Cholesterol, total 133 10/12/2017 03:04 AM    HDL Cholesterol 57 10/12/2017 03:04 AM    LDL, calculated 64.2 10/12/2017 03:04 AM    Triglyceride 59 10/12/2017 03:04 AM    CHOL/HDL Ratio 2.3 10/12/2017 03:04 AM       Lab Results   Component Value Date/Time    Sodium 129 10/13/2017 03:25 AM    Potassium 3.3 10/13/2017 03:25 AM    Chloride 92 10/13/2017 03:25 AM    CO2 29 10/13/2017 03:25 AM    Anion gap 8 10/13/2017 03:25 AM    Glucose 112 10/13/2017 03:25 AM    BUN 19 10/13/2017 03:25 AM    Creatinine 0.61 10/13/2017 03:25 AM    BUN/Creatinine ratio 31 10/13/2017 03:25 AM    GFR est AA >60 10/13/2017 03:25 AM    GFR est non-AA >60 10/13/2017 03:25 AM    Calcium 8.5 10/13/2017 03:25 AM    Bilirubin, total 0.3 10/11/2017 07:43 AM    AST (SGOT) 18 10/11/2017 07:43 AM    Alk. phosphatase 115 10/11/2017 07:43 AM    Protein, total 7.3 10/11/2017 07:43 AM    Albumin 3.2 10/11/2017 07:43 AM    Globulin 4.1 10/11/2017 07:43 AM    A-G Ratio 0.8 10/11/2017 07:43 AM    ALT (SGPT) 25 10/11/2017 07:43 AM           Assessment:     Assessment:     Diagnoses and all orders for this visit:    1. Accelerated hypertension  -     AMB POC EKG ROUTINE W/ 12 LEADS, INTER & REP  -     METABOLIC PANEL, BASIC; Future  -     2D ECHO LIMTED ADULT W OR WO CONTR; Future    2. NICM (nonischemic cardiomyopathy) ([facility]  -     METABOLIC PANEL, BASIC; Future  -     2D ECHO LIMTED ADULT W OR WO CONTR; Future    3.  Chronic obstructive pulmonary disease, unspecified COPD type ([facility]  -     METABOLIC PANEL, BASIC; Future  -     2D ECHO LIMTED ADULT W OR WO CONTR; Future    Other orders  -     carvedilol (COREG) 6.25 mg tablet; Take 1 Tab by mouth two (2) times daily (with meals). -     furosemide (LASIX) 40 mg tablet; 1.5 tabs daily. Take additional 1/2 tab if weight increases  Indications: Pulmonary Edema due to Chronic Heart Failure        ICD-10-CM ICD-9-CM    1. Accelerated hypertension I10 401.0 AMB POC EKG ROUTINE W/ 12 LEADS, INTER & REP      METABOLIC PANEL, BASIC      2D ECHO LIMTED ADULT W OR WO CONTR   2. NICM (nonischemic cardiomyopathy) ([facility] H74.7 846.8 METABOLIC PANEL, BASIC      2D ECHO LIMTED ADULT W OR WO CONTR   3. Chronic obstructive pulmonary disease, unspecified COPD type ([facility] H09.6 324 METABOLIC PANEL, BASIC      2D ECHO LIMTED ADULT W OR WO CONTR     Orders Placed This Encounter    METABOLIC PANEL, BASIC     Standing Status:   Future     Standing Expiration Date:   2018    AMB POC EKG ROUTINE W/ 12 LEADS, INTER & REP     Order Specific Question:   Reason for Exam:     Answer:   routine    2D ECHO LIMTED ADULT W OR WO CONTR     Standing Status:   Future     Standing Expiration Date:   2018     Order Specific Question:   Reason for Exam:     Answer:   90 day ECHO Ef%     Order Specific Question:   Contrast Enhancement (Bubble Study, Definity, Optison) may be used if criteria listed in established evidence-based protocol has been identified. Answer: Yes    carvedilol (COREG) 6.25 mg tablet     Sig: Take 1 Tab by mouth two (2) times daily (with meals). Dispense:  60 Tab     Refill:  2    furosemide (LASIX) 40 mg tablet     Si.5 tabs daily. Take additional 1/2 tab if weight increases  Indications: Pulmonary Edema due to Chronic Heart Failure     Dispense:  60 Tab     Refill:  1        Plan:     Patient is a 63-year-old male status post admission for nonischemic cardiomyopathy ejection fraction on cardiac catheterization 3035%. Clinically he is [place] heart class I however his dyspnea may in part be secondary to COPD and not systolic dysfunction.   Will advance heart failure therapy by increasing carvedilol to 6.25 mg twice a day, low normal blood pressure will reduce Lasix from 40 mg twice a day to 40 mg in the morning, 20 mg in the afternoon. He understands if there is a 3 pound weight gain from 1 day or 5 pounds total in a week he is to resume the half a tablet in the afternoon until his weight goes back to baseline. Follow-up in 2 weeks, anticipate clearing patient to return to work as a  which is a nonphysical nonexertional task per patient. A lab slip has been provided to repeat metabolic panel in 1 month, I have scheduled his 90 day limited echocardiogram which will be in the beginning of January 2018.   Adelita Joe NP

## 2017-10-18 NOTE — MR AVS SNAPSHOT
Visit Information Date & Time Provider Department Dept. Phone Encounter #  
 10/18/2017  2:30 PM Beti Scales NP [facility] [phone] Follow-up Instructions Return in about 2 weeks (around 11/1/2017). Your Appointments 11/1/2017  1:45 PM  
2 WEEK with [facility] Letychava Davidmarcos) Appt Note: [address]  
[phone] [address]  
  
    
 1/12/2018 10:00 AM  
PROCEDURE with ECHO, [address] Jeremiah Rogers) [address]  
[phone] [address]  
  
    
 1/12/2018 10:45 AM  
ESTABLISHED PATIENT with Heidi Valdez MD  
[facility] Jeremiah Rogers) Appt Note: Dr. Naz Fernnadez [address]  
[phone] [address] Upcoming Health Maintenance Date Due Hepatitis C Screening 1950 Pneumococcal 19-64 Medium Risk (1 of 1 - PPSV23) 12/16/1971 DTaP/Tdap/Td series (1 - Tdap) 12/16/1973 FOBT Q 1 YEAR AGE 50-75 12/16/2002 ZOSTER VACCINE AGE 60> 10/16/2012 Allergies as of 10/18/2017  Review Complete On: 10/18/2017 By: Beti Scales NP Severity Noted Reaction Type Reactions Adhesive Tape-silicones  30/58/5956    Other (comments) Pulls skin off  
 Sulfa (Sulfonamide Antibiotics)  02/17/2016    Shortness of Breath Current Immunizations  Never Reviewed Name Date Influenza Vaccine (Quad) PF 10/12/2017  8:01 AM  
  
 Not reviewed this visit You Were Diagnosed With   
  
 Codes Comments Accelerated hypertension    -  Primary ICD-10-CM: I10 
ICD-9-CM: 401.0 NICM (nonischemic cardiomyopathy) ([facility] [facility]     ICD-10-CM: I42.8 ICD-9-CM: 425.4 Chronic obstructive pulmonary disease, unspecified COPD type ([facility]     ICD-10-CM: J44.9 ICD-9-CM: 272 Vitals BP Pulse Resp Height(growth percentile) Weight(growth percentile) SpO2  
 100/62 (BP 1 Location: Right arm, BP Patient Position: Sitting) 82 16 5' 7\" (1.702 m) 189 lb 4.8 oz (85.9 kg) 95% BMI Smoking Status 29.65 kg/m2 Former Smoker Vitals History BMI and BSA Data Body Mass Index Body Surface Area  
 29.65 kg/m 2 2.02 m 2 Preferred Pharmacy Pharmacy Name Phone [facility]  [place] [phone] Your Updated Medication List  
  
   
This list is accurate as of: 10/18/17  3:48 PM.  Always use your most recent med list.  
  
  
  
  
 albuterol 90 mcg/actuation inhaler Commonly known as:  PROVENTIL HFA, VENTOLIN HFA, PROAIR HFA Take 2 Puffs by inhalation daily as needed for Wheezing or Shortness of Breath. allopurinol 300 mg tablet Commonly known as:  Maya Huh Take 300 mg by mouth daily. aspirin delayed-release 325 mg tablet Take 1 Tab by mouth two (2) times a day for 30 days. atorvastatin 20 mg tablet Commonly known as:  LIPITOR Take 1 Tab by mouth daily. Refills by Dr. Sierra Neither 160-4.5 mcg/actuation Hfaa Commonly known as:  SYMBICORT Take 2 Puffs by inhalation two (2) times daily as needed (Shortness of Breath). busPIRone 15 mg tablet Commonly known as:  BUSPAR Take 15 mg by mouth three (3) times daily. carvedilol 6.25 mg tablet Commonly known as:  Vergia Dave Take 1 Tab by mouth two (2) times daily (with meals). citalopram 40 mg tablet Commonly known as:  Comfort Nagel Take 40 mg by mouth daily. CLARITIN 10 mg tablet Generic drug:  loratadine Take 10 mg by mouth. diclofenac EC 75 mg EC tablet Commonly known as:  VOLTAREN Take 75 mg by mouth two (2) times a day. furosemide 40 mg tablet Commonly known as:  LASIX  
1.5 tabs daily. Take additional 1/2 tab if weight increases  Indications: Pulmonary Edema due to Chronic Heart Failure  
  
 ketoconazole 2 % shampoo Commonly known as:  NIZORAL Apply  to affected area daily as needed for Itching. Apply to scalp and face up to three times a week as needed for flaky skin  
  
 omeprazole 20 mg capsule Commonly known as:  PRILOSEC Take 20 mg by mouth daily. oxyCODONE-acetaminophen  mg per tablet Commonly known as:  PERCOCET 10 Take 1 Tab by mouth three (3) times daily as needed for Pain (Back pain). pantoprazole 40 mg tablet Commonly known as:  PROTONIX Take 1 Tab by mouth Daily (before breakfast). potassium chloride 20 mEq tablet Commonly known as:  K-DUR, KLOR-CON Take 2 Tabs by mouth two (2) times a day. predniSONE 20 mg tablet Commonly known as:  Kaylee Roers Take 20 mg by mouth daily (with breakfast). testosterone cypionate 200 mg/mL injection Commonly known as:  DEPOTESTOTERONE CYPIONATE  
200 mg by IntraMUSCular route every fourteen (14) days. tiotropium 18 mcg inhalation capsule Commonly known as:  Arsalan Dux Take 1 Cap by inhalation daily. valsartan 160 mg tablet Commonly known as:  DIOVAN Take 160 mg by mouth daily. Prescriptions Sent to Pharmacy Refills  
 carvedilol (COREG) 6.25 mg tablet 2 Sig: Take 1 Tab by mouth two (2) times daily (with meals). Class: Normal  
 Pharmacy: [facility] [address] #: [phone] Route: Oral  
 furosemide (LASIX) 40 mg tablet 1 Si.5 tabs daily. Take additional 1/2 tab if weight increases  Indications: Pulmonary Edema due to Chronic Heart Failure  Class: Normal  
 Pharmacy: [facility] [address] [place] AT [address] [place] [address] #: [phone] We Performed the Following AMB POC EKG ROUTINE W/ 12 LEADS, INTER & REP [83544 CPT(R)] Follow-up Instructions Return in about 2 weeks (around 11/1/2017). To-Do List   
 10/31/2017 Lab:  METABOLIC PANEL, BASIC   
  
 01/12/2018 ECHO:  2D ECHO LIMTED ADULT W OR WO CONTR Introducing [facility] & HEALTH SERVICES! Darell Rosales introduces [employer] patient portal. Now you can access parts of your medical record, email your doctor's office, and request medication refills online. 1. In your internet browser, go to [email or web address] 2. Click on the First Time User? Click Here link in the Sign In box. You will see the New Member Sign Up page. 3. Enter your [employer] Access Code exactly as it appears below. You will not need to use this code after youve completed the sign-up process. If you do not sign up before the expiration date, you must request a new code. · [employer] Access Code: [email or web address] Expires: 11/22/2017  4:12 PM 
 
4. Enter the last four digits of your Social Security Number (xxxx) and Date of Birth (mm/dd/yyyy) as indicated and click Submit. You will be taken to the next sign-up page. 5. Create a [employer] ID. This will be your [employer] login ID and cannot be changed, so think of one that is secure and easy to remember. 6. Create a [employer] password. You can change your password at any time. 7. Enter your Password Reset Question and Answer. This can be used at a later time if you forget your password. 8. Enter your e-mail address. You will receive e-mail notification when new information is available in [address] 9. Click Sign Up. You can now view and download portions of your medical record. 10. Click the Download Summary menu link to download a portable copy of your medical information.  
 
If you have questions, please visit the Frequently Asked Questions section of the [employer]. Remember, [employer] is NOT to be used for urgent needs. For medical emergencies, dial 911. Now available from your iPhone and Android! Please provide this summary of care documentation to your next provider. Your primary care clinician is listed as Ulises Winkler. If you have any questions after today's visit, please call [phone].

## 2017-10-19 ENCOUNTER — PATIENT OUTREACH (OUTPATIENT)
Dept: CARDIOLOGY CLINIC | Age: 67
End: 2017-10-19

## 2017-10-19 RX ORDER — CARVEDILOL 6.25 MG/1
6.25 TABLET ORAL 2 TIMES DAILY WITH MEALS
Qty: 60 TAB | Refills: 2 | Status: SHIPPED | OUTPATIENT
Start: 2017-10-19 | End: 2018-01-24 | Stop reason: SDUPTHER

## 2017-10-19 RX ORDER — FUROSEMIDE 40 MG/1
TABLET ORAL
Qty: 60 TAB | Refills: 1 | Status: SHIPPED | OUTPATIENT
Start: 2017-10-19 | End: 2017-11-01 | Stop reason: SDUPTHER

## 2017-10-19 NOTE — PROGRESS NOTES
Pt called to report that medications must be called into [facility] at [facility] and [address] due to insurance. Chart sent to [facility].

## 2017-10-24 ENCOUNTER — PATIENT OUTREACH (OUTPATIENT)
Dept: CARDIOLOGY CLINIC | Age: 67
End: 2017-10-24

## 2017-10-24 RX ORDER — TADALAFIL 20 MG/1
20 TABLET ORAL AS NEEDED
COMMUNITY
End: 2019-01-16

## 2017-10-24 NOTE — PROGRESS NOTES
This note will not be viewable in [address] Pt called with a number of questions. We reviewed pt's medications. Pt reports that he has been taking 1 potassium pill, he has been taking aspirin 325mg BID after hip surgery. Pt is asking if he is to continue this. Pt also is taking omeprazole and pantoprazole. Pt asked if he should do this as well. Also noted that pt is taking Voltaren (NSAID) as well as injection for testosterone. Pt states that he cannot have the injection without clearance from [facility] per PCP. Pt also reports that he has Cialis PRN and is asking if this medication is safe to use and when he may resume sexual activities. Pt states that he has a lab slip for a BMP before next follow up appt but states that [facility] states this is not the case. Pt's discharge states BMP before follow up and it appears that the order is for one month. Pt called to ask if these items could be addressed. Chart sent to [facility] for review.

## 2017-10-24 NOTE — Clinical Note
Good afternoon, Can you please address this pt's many questions. I will call, just need answers. Many answers. Thank you.  Mian

## 2017-10-25 ENCOUNTER — PATIENT OUTREACH (OUTPATIENT)
Dept: CARDIOLOGY CLINIC | Age: 67
End: 2017-10-25

## 2017-10-25 NOTE — PROGRESS NOTES
This note will not be viewable in [address] Called pt and LVM stating my name, NN at [facility], date and time of call and that I have answers for his questions from yesterday.

## 2017-10-25 NOTE — PROGRESS NOTES
This note will not be viewable in [address] NN contacted the patient by telephone to perform CHF follow Up. Spoke to pt regarding the following:  Per [facility] Nunn, pt is not to use ED medication (Cialis) until HF medications are stable. Pt informed that he may have sexual intercourse. Pt is to take one PPL and pt states that he will take pantoprazole. Pt states understanding that he is not to take testosterone injection until next ECHO. Pt states that he has prepaid for this. I told that pt to ask what can be done and that [facility] will assist if pt needs a note for medical condition. Pt has been informed that NSAID is okay and that he can take 1 ASA 325mg daily. Pt states appreciation for addressing his questions. Transportation:  self    Zone: green   Signs/Symptoms: Swelling none; SOB none;   Diet: low Na  Medications Reconciled at this time:  yes  Home health?: no    Goals      Knowledge and adherence medication (ie. action, side effects, missed dose, etc.)            10/25/17  Reviewed medications with pt and changes noted. Chart sent to TESSA Nunn to update medications. AFP          Patient reminded that there is a cardiologist on call 24 hours a day / 7 days a week (M-F 5pm to 8am and from Friday 5pm until Monday 8a for the weekend) should the patient have questions or concerns. Patient reminded to call 911 if situation is emergent or patient feels the situation is emergent. Provided pt with [facility] office telephone number.

## 2017-10-31 DIAGNOSIS — I10 ACCELERATED HYPERTENSION: ICD-10-CM

## 2017-10-31 DIAGNOSIS — I42.8 NICM (NONISCHEMIC CARDIOMYOPATHY) (HCC): ICD-10-CM

## 2017-10-31 DIAGNOSIS — J44.9 CHRONIC OBSTRUCTIVE PULMONARY DISEASE, UNSPECIFIED COPD TYPE (HCC): ICD-10-CM

## 2017-11-01 ENCOUNTER — OFFICE VISIT (OUTPATIENT)
Dept: CARDIOLOGY CLINIC | Age: 67
End: 2017-11-01

## 2017-11-01 VITALS
SYSTOLIC BLOOD PRESSURE: 90 MMHG | OXYGEN SATURATION: 95 % | BODY MASS INDEX: 31.28 KG/M2 | WEIGHT: 199.3 LBS | RESPIRATION RATE: 16 BRPM | DIASTOLIC BLOOD PRESSURE: 50 MMHG | HEART RATE: 80 BPM | HEIGHT: 67 IN

## 2017-11-01 DIAGNOSIS — I42.8 NICM (NONISCHEMIC CARDIOMYOPATHY) (HCC): Primary | ICD-10-CM

## 2017-11-01 DIAGNOSIS — I47.1 PAT (PAROXYSMAL ATRIAL TACHYCARDIA) (HCC): ICD-10-CM

## 2017-11-01 DIAGNOSIS — J44.9 CHRONIC OBSTRUCTIVE PULMONARY DISEASE, UNSPECIFIED COPD TYPE (HCC): ICD-10-CM

## 2017-11-01 RX ORDER — POTASSIUM CHLORIDE 20 MEQ/1
20 TABLET, EXTENDED RELEASE ORAL DAILY
Qty: 90 TAB | Refills: 1 | Status: SHIPPED | OUTPATIENT
Start: 2017-11-01 | End: 2018-05-09 | Stop reason: SDUPTHER

## 2017-11-01 RX ORDER — FUROSEMIDE 40 MG/1
40 TABLET ORAL DAILY
Qty: 90 TAB | Refills: 1 | Status: SHIPPED | OUTPATIENT
Start: 2017-11-01 | End: 2018-05-21 | Stop reason: SDUPTHER

## 2017-11-01 NOTE — MR AVS SNAPSHOT
Visit Information Date & Time Provider Department Dept. Phone Encounter #  
 11/1/2017  1:45 PM Haven Jeong NP [facility] [phone] [id number] Your Appointments 12/6/2017  1:15 PM  
1 MONTH with Haven Jeong NP [facility] [address]) Appt Note: DNPSukinjal Cari [place]  
 [address] [facility]  
[phone] [address] [facility]  
  
    
 1/12/2018 10:00 AM  
PROCEDURE with ECHO, [address]) [address] [facility]  
[phone] [address] [facility]  
  
    
 1/12/2018 10:45 AM  
ESTABLISHED PATIENT with Ariela Velez MD  
[facility] [address]) Appt Note: Dr. Manjinder Galvin [facility]  
[phone] [address] [facility] Upcoming Health Maintenance Date Due Hepatitis C Screening 1950 Pneumococcal 19-64 Medium Risk (1 of 1 - PPSV23) 12/16/1971 DTaP/Tdap/Td series (1 - Tdap) 12/16/1973 FOBT Q 1 YEAR AGE 50-75 12/16/2002 ZOSTER VACCINE AGE 60> 10/16/2012 Allergies as of 11/1/2017  Review Complete On: 11/1/2017 By: Haven Jeong NP Severity Noted Reaction Type Reactions Adhesive Tape-silicones  49/39/2940    Other (comments) Pulls skin off  
 Sulfa (Sulfonamide Antibiotics)  02/17/2016    Shortness of Breath Current Immunizations  Never Reviewed Name Date Influenza Vaccine (Quad) PF 10/12/2017  8:01 AM  
  
 Not reviewed this visit You Were Diagnosed With   
  
 Codes Comments NICM (nonischemic cardiomyopathy) ([facility]    -  Primary ICD-10-CM: I42.8 ICD-9-CM: 425.4 PAT (paroxysmal atrial tachycardia) (HCC)     ICD-10-CM: I47.1 ICD-9-CM: 427.0  Chronic obstructive pulmonary disease, unspecified COPD type ([facility] ICD-10-CM: J44.9 ICD-9-CM: 712 Vitals BP Pulse Resp Height(growth percentile) Weight(growth percentile) SpO2  
 90/50 (BP 1 Location: Right arm, BP Patient Position: Sitting) 80 16 5' 7\" (1.702 m) 199 lb 4.8 oz (90.4 kg) 95% BMI Smoking Status 31.21 kg/m2 Former Smoker Vitals History BMI and BSA Data Body Mass Index Body Surface Area  
 31.21 kg/m 2 2.07 m 2 Preferred Pharmacy Pharmacy Name Phone [phone] [facility] [phone] Your Updated Medication List  
  
   
This list is accurate as of: 11/1/17  2:32 PM.  Always use your most recent med list.  
  
  
  
  
 albuterol 90 mcg/actuation inhaler Commonly known as:  PROVENTIL HFA, VENTOLIN HFA, PROAIR HFA Take 2 Puffs by inhalation daily as needed for Wheezing or Shortness of Breath. allopurinol 300 mg tablet Commonly known as:  Ximena Gosling Take 300 mg by mouth daily. atorvastatin 20 mg tablet Commonly known as:  LIPITOR Take 1 Tab by mouth daily. Refills by Dr. Dahiana Nunez 160-4.5 mcg/actuation Hfaa Commonly known as:  SYMBICORT Take 2 Puffs by inhalation two (2) times daily as needed (Shortness of Breath). busPIRone 15 mg tablet Commonly known as:  BUSPAR Take 15 mg by mouth three (3) times daily. carvedilol 6.25 mg tablet Commonly known as:  Cori Courtland Take 1 Tab by mouth two (2) times daily (with meals). CIALIS 20 mg tablet Generic drug:  tadalafil Take 20 mg by mouth as needed. citalopram 40 mg tablet Commonly known as:  Tempie Mickey Take 40 mg by mouth daily. CLARITIN 10 mg tablet Generic drug:  loratadine Take 10 mg by mouth. diclofenac EC 75 mg EC tablet Commonly known as:  VOLTAREN Take 75 mg by mouth two (2) times a day. furosemide 40 mg tablet Commonly known as:  LASIX Take 1 Tab by mouth daily. May take 1/2 tab daily PRN for increased edema  Indications: Pulmonary Edema due to Chronic Heart Failure  
  
 ketoconazole 2 % shampoo Commonly known as:  NIZORAL Apply  to affected area daily as needed for Itching. Apply to scalp and face up to three times a week as needed for flaky skin  
  
 omeprazole 20 mg capsule Commonly known as:  PRILOSEC Take 20 mg by mouth daily. oxyCODONE-acetaminophen  mg per tablet Commonly known as:  PERCOCET 10 Take 1 Tab by mouth three (3) times daily as needed for Pain (Back pain). pantoprazole 40 mg tablet Commonly known as:  PROTONIX Take 1 Tab by mouth Daily (before breakfast). potassium chloride 20 mEq tablet Commonly known as:  K-DUR, KLOR-CON Take 1 Tab by mouth daily. predniSONE 20 mg tablet Commonly known as:  Maximiliano Lever Take 20 mg by mouth daily (with breakfast). testosterone cypionate 200 mg/mL injection Commonly known as:  DEPOTESTOTERONE CYPIONATE  
200 mg by IntraMUSCular route every fourteen (14) days. tiotropium 18 mcg inhalation capsule Commonly known as:  Tiff Greet Take 1 Cap by inhalation daily. valsartan 160 mg tablet Commonly known as:  DIOVAN Take 160 mg by mouth daily. Prescriptions Sent to Pharmacy Refills  
 furosemide (LASIX) 40 mg tablet 1 Sig: Take 1 Tab by mouth daily. May take 1/2 tab daily PRN for increased edema  Indications: Pulmonary Edema due to Chronic Heart Failure Class: Normal  
 Pharmacy: [address] Ph #: [phone] Route: Oral  
 potassium chloride (K-DUR, KLOR-CON) 20 mEq tablet 1 Sig: Take 1 Tab by mouth daily. Class: Normal  
 Pharmacy: [address] Ph #: [phone] Route: Oral  
  
We Performed the Following AMB POC EKG ROUTINE W/ 12 LEADS, INTER & REP [26284 CPT(R)] Introducing [facility] & HEALTH SERVICES! [facility] introduces [employer] patient portal. Now you can access parts of your medical record, email your doctor's office, and request medication refills online. 1. In your internet browser, go to [email or web address] 2. Click on the First Time User? Click Here link in the Sign In box. You will see the New Member Sign Up page. 3. Enter your [employer] Access Code exactly as it appears below. You will not need to use this code after youve completed the sign-up process. If you do not sign up before the expiration date, you must request a new code. · [employer] Access Code: [email or web address] Expires: 11/22/2017  4:12 PM 
 
4. Enter the last four digits of your Social Security Number (xxxx) and Date of Birth (mm/dd/yyyy) as indicated and click Submit. You will be taken to the next sign-up page. 5. Create a [employer] ID. This will be your [employer] login ID and cannot be changed, so think of one that is secure and easy to remember. 6. Create a [employer] password. You can change your password at any time. 7. Enter your Password Reset Question and Answer. This can be used at a later time if you forget your password. 8. Enter your e-mail address. You will receive e-mail notification when new information is available in [address] 9. Click Sign Up. You can now view and download portions of your medical record. 10. Click the Download Summary menu link to download a portable copy of your medical information. If you have questions, please visit the Frequently Asked Questions section of the [employer] website. Remember, [employer] is NOT to be used for urgent needs. For medical emergencies, dial 911. Now available from your iPhone and Android! Please provide this summary of care documentation to your next provider. Your primary care clinician is listed as Purnima Ahumada.  If you have any questions after today's visit, please call [phone].

## 2017-11-01 NOTE — LETTER
[address] 
 
11/1/2017 2:22 PM 
 
Mr. Frank Lee [address] [facility] [address] [address] [facility] To Whom It May Concern: 
 
Frank Lee is  under the care of [address]. I have instructed patient to discontinue use of testosterone replacements If there are questions or concerns please have the patient contact our office. Cardiovascular events: Available studies are inconclusive regarding the risk of developing major adverse cardiovascular events (MACE) such as nonfatal MI, stroke, or cardiovascular death following testosterone use. Some studies have suggested an increased risk of cardiovascular events among groups of men prescribed testosterone therapy Cory Floewrs 2010; Didi 2014; George Barker 2013), although the overall evidence does not demonstrate an increased or decreased cardiovascular risk (Endocrine Society [Damian 2010]; Corona 2014; Kathryn 2015). According to the FDA, prescribe testosterone therapy only for men with low testosterone levels caused by certain medical conditions (eg, disorders of the testicles, pituitary gland, brain) and confirmed by laboratory tests (FDA Drug Safety Communication 2015). However, in a position statement issued by the American Association of Clinical Endocrinologists (AACE) and the [employer] of Endocrinology (ACE), they recommend that after a thorough diagnostic work-up, testosterone replacement should be guided by signs and symptoms and testosterone concentrations rather than the underlying cause (AACE/ACE [ 2015]). The Endocrine Society suggests it may be prudent to avoid testosterone therapy in men who have experienced a cardiovascular event (eg, MI, stroke, acute coronary syndrome) in the past six months (The Endocrine Society 2014). Evaluate patients for cardiovascular risk factors prior to initiating therapy and monitor closely during therapy for cardiovascular events Sincerely, 
 
 
 Lobo Churchill DNP, ANP-BC

## 2017-11-01 NOTE — PROGRESS NOTES
Aneudy Young DNP, ANP-BC  Subjective/HPI:     Frank Lee is a 61 y.o. male is here for heart failure follow-up. Has nonischemic cardiomyopathy ejection fraction 30-35% via coronary angiography. Since his last visit with me 2 weeks ago it appears he has gained 10 pounds however patient admits since he has stopped drinking alcohol sweets and particularly sherbet and ice cream have been a significant staple in his diet. He denies fluid retention, edema swelling orthopnea or paroxysmal nocturnal dyspnea. He is taking all medications as directed. He denies lightheadedness or dizziness in reviewing his initial blood pressure today. Patient is looking forward to going back to work next week as a  essentially desk and brain work no physical activity.       PCP Provider  Doreen Fontaine MD  Past Medical History:   Diagnosis Date    Anemia 10/13/2017    Arthritis     Chronic obstructive pulmonary disease (HCC)     Chronic pain     back    GERD (gastroesophageal reflux disease)     Hypertension     Hyponatremia 10/13/2017    Ill-defined condition     Gout    Psychiatric disorder     Generalized Anxiety Disorder    Psychiatric disorder     ETOH abuse      Past Surgical History:   Procedure Laterality Date    HX COLECTOMY  2000    diverticulitis    HX OTHER SURGICAL Left     mastoidectomy and inner ear surgery- age  18   [de-identified] OTHER SURGICAL      [facility]    HX TONSILLECTOMY      KNEE SCOPE,MED OR LAT MENIS REPAIR Left      Allergies   Allergen Reactions    Adhesive Tape-Silicones Other (comments)     Pulls skin off    Sulfa (Sulfonamide Antibiotics) Shortness of Breath      Family History   Problem Relation Age of Onset    Cancer Mother      lung    Arthritis-osteo Mother     Cancer Father      throat    Cancer Brother      prostate      Current Outpatient Prescriptions   Medication Sig    furosemide (LASIX) 40 mg tablet Take 1 Tab by mouth daily. May take 1/2 tab daily PRN for increased edema  Indications: Pulmonary Edema due to Chronic Heart Failure    potassium chloride (K-DUR, KLOR-CON) 20 mEq tablet Take 1 Tab by mouth daily.  carvedilol (COREG) 6.25 mg tablet Take 1 Tab by mouth two (2) times daily (with meals).  atorvastatin (LIPITOR) 20 mg tablet Take 1 Tab by mouth daily. Refills by Dr. Felicitas Rhoades pantoprazole (PROTONIX) 40 mg tablet Take 1 Tab by mouth Daily (before breakfast).  valsartan (DIOVAN) 160 mg tablet Take 160 mg by mouth daily.  predniSONE (DELTASONE) 20 mg tablet Take 20 mg by mouth daily (with breakfast).  ketoconazole (NIZORAL) 2 % shampoo Apply  to affected area daily as needed for Itching. Apply to scalp and face up to three times a week as needed for flaky skin    oxyCODONE-acetaminophen (PERCOCET 10)  mg per tablet Take 1 Tab by mouth three (3) times daily as needed for Pain (Back pain).  loratadine (CLARITIN) 10 mg tablet Take 10 mg by mouth.  diclofenac EC (VOLTAREN) 75 mg EC tablet Take 75 mg by mouth two (2) times a day.  allopurinol (ZYLOPRIM) 300 mg tablet Take 300 mg by mouth daily.  omeprazole (PRILOSEC) 20 mg capsule Take 20 mg by mouth daily.  busPIRone (BUSPAR) 15 mg tablet Take 15 mg by mouth three (3) times daily.  citalopram (CELEXA) 40 mg tablet Take 40 mg by mouth daily.  budesonide-formoterol (SYMBICORT) 160-4.5 mcg/actuation HFA inhaler Take 2 Puffs by inhalation two (2) times daily as needed (Shortness of Breath).  tiotropium (SPIRIVA) 18 mcg inhalation capsule Take 1 Cap by inhalation daily.  albuterol (PROVENTIL HFA, VENTOLIN HFA, PROAIR HFA) 90 mcg/actuation inhaler Take 2 Puffs by inhalation daily as needed for Wheezing or Shortness of Breath.  tadalafil (CIALIS) 20 mg tablet Take 20 mg by mouth as needed.     testosterone cypionate (DEPOTESTOTERONE CYPIONATE) 200 mg/mL injection 200 mg by IntraMUSCular route every fourteen (14) days.     No current facility-administered medications for this visit. Vitals:    11/01/17 1350 11/01/17 1402   BP: 90/54 90/50   Pulse: 80    Resp: 16    SpO2: 95%    Weight: 199 lb 4.8 oz (90.4 kg)    Height: 5' 7\" (1.702 m)      Social History     Social History    Marital status:      Spouse name: N/A    Number of children: N/A    Years of education: N/A     Occupational History    Not on file. Social History Main Topics    Smoking status: Former Smoker     Packs/day: 2.50     Years: 29.00     Quit date: 2/17/1993    Smokeless tobacco: Never Used    Alcohol use No      Comment: quit march 15 2017 reports was more than 21 beers  per week     Drug use: No    Sexual activity: Not on file     Other Topics Concern    Not on file     Social History Narrative       I have reviewed the nurses notes, vitals, problem list, allergy list, medical history, family, social history and medications. Review of Symptoms:    General: + Weight gain, able to perform usual activities of daily life without any issues  HEENT: Denies blurred vision, headaches, epistaxis and difficulty swallowing. Respiratory: Denies shortness of breath, COWART, wheezing or stridor. Cardiovascular: Denies precordial pain, palpitations, edema or PND  Gastrointestinal: Denies poor appetite, indigestion, abdominal pain or blood in stool  Musculoskeletal: Denies pain or swelling from muscles or joints  Neurologic: Denies tremor, paresthesias, or sensory motor disturbance  Skin: Denies rash, itching or texture change. Physical Exam:      General: Well developed, in no acute distress, cooperative and alert  HEENT: No carotid bruits, no JVD, trach is midline. Neck Supple, PEERL, EOM intact. Heart:  Normal S1/S2 negative S3 or S4.  Regular, no murmur, gallop or rub.   Respiratory: diminished bilaterally at the bases, there are no rales or rhonchi   Abdomen:   Soft, non-tender, no masses, bowel sounds are active.   Extremities: Trace bilateral ankle edema, normal cap refill, no cyanosis, atraumatic. Neuro: A&Ox3, speech clear, gait stable. Skin: The skin around bilateral ankles anteriorly are mildly injected, no signs of skin breakdown or evidence of cellulitis  Vascular: 2+ pulses symmetric in all extremities       Repeat /64 left arm reg cuff. Cardiographics    ECG: Normal sinus rhythm  Results for orders placed or performed during the hospital encounter of 10/11/17   EKG, 12 LEAD, INITIAL   Result Value Ref Range    Ventricular Rate 119 BPM    Atrial Rate 119 BPM    P-R Interval 176 ms    QRS Duration 96 ms    Q-T Interval 314 ms    QTC Calculation (Bezet) 441 ms    Calculated P Axis 6 degrees    Calculated R Axis 24 degrees    Calculated T Axis -14 degrees    Diagnosis       Sinus tachycardia  When compared with ECG of 12-SEP-2017 10:07,  Vent. rate has increased BY  57 BPM  T wave inversion now evident in Inferior leads  Confirmed by Deric West ([id number]) on 10/11/2017 12:05:29 PM           Cardiology Labs:  Lab Results   Component Value Date/Time    Cholesterol, total 133 10/12/2017 03:04 AM    HDL Cholesterol 57 10/12/2017 03:04 AM    LDL, calculated 64.2 10/12/2017 03:04 AM    Triglyceride 59 10/12/2017 03:04 AM    CHOL/HDL Ratio 2.3 10/12/2017 03:04 AM       Lab Results   Component Value Date/Time    Sodium 129 10/13/2017 03:25 AM    Potassium 3.3 10/13/2017 03:25 AM    Chloride 92 10/13/2017 03:25 AM    CO2 29 10/13/2017 03:25 AM    Anion gap 8 10/13/2017 03:25 AM    Glucose 112 10/13/2017 03:25 AM    BUN 19 10/13/2017 03:25 AM    Creatinine 0.61 10/13/2017 03:25 AM    BUN/Creatinine ratio 31 10/13/2017 03:25 AM    GFR est AA >60 10/13/2017 03:25 AM    GFR est non-AA >60 10/13/2017 03:25 AM    Calcium 8.5 10/13/2017 03:25 AM    Bilirubin, total 0.3 10/11/2017 07:43 AM    AST (SGOT) 18 10/11/2017 07:43 AM    Alk.  phosphatase 115 10/11/2017 07:43 AM    Protein, total 7.3 10/11/2017 07:43 AM    Albumin 3.2 10/11/2017 07:43 AM    Globulin 4.1 10/11/2017 07:43 AM    A-G Ratio 0.8 10/11/2017 07:43 AM    ALT (SGPT) 25 10/11/2017 07:43 AM           Assessment:     Assessment:     Diagnoses and all orders for this visit:    1. NICM (nonischemic cardiomyopathy) ([facility]    2. PAT (paroxysmal atrial tachycardia) ([facility])  -     AMB POC EKG ROUTINE W/ 12 LEADS, INTER & REP    3. Chronic obstructive pulmonary disease, unspecified COPD type ([facility]    Other orders  -     furosemide (LASIX) 40 mg tablet; Take 1 Tab by mouth daily. May take 1/2 tab daily PRN for increased edema  Indications: Pulmonary Edema due to Chronic Heart Failure  -     potassium chloride (K-DUR, KLOR-CON) 20 mEq tablet; Take 1 Tab by mouth daily. ICD-10-CM ICD-9-CM    1. NICM (nonischemic cardiomyopathy) ([facility]) I42.8 425.4    2. PAT (paroxysmal atrial tachycardia) ([facility]) I47.1 427.0 AMB POC EKG ROUTINE W/ 12 LEADS, INTER & REP   3. Chronic obstructive pulmonary disease, unspecified COPD type ([place] [address] J44.9 496      Orders Placed This Encounter    AMB POC EKG ROUTINE W/ 12 LEADS, INTER & REP     Order Specific Question:   Reason for Exam:     Answer:   routine    furosemide (LASIX) 40 mg tablet     Sig: Take 1 Tab by mouth daily. May take 1/2 tab daily PRN for increased edema  Indications: Pulmonary Edema due to Chronic Heart Failure     Dispense:  90 Tab     Refill:  1    potassium chloride (K-DUR, KLOR-CON) 20 mEq tablet     Sig: Take 1 Tab by mouth daily. Dispense:  90 Tab     Refill:  1     Refills by Dr. Eduardo Schmid:     Mr. Delilah Perez is a 14-year-old male with a history of nonischemic cardiomyopathy ejection fraction of 35%. Presently maintained on carvedilol 6.25 mg twice a day, Diovan 160 mg daily. He has remained euvolemic despite weight gain secondary to dietary indiscretions of sweets. Will reduce Lasix to 40 mg daily with as needed dosing of 20 mg additional for edema or dyspnea, continue potassium 20 mEq daily.   Reviewed with patient low-sodium intake as well as decreasing overall caloric and glucose intake secondary to weight gain. He may return to work, he is to continue to wear LifeVest, his echo will be January 12, 2018. Follow-up with me in 1 month, he will see Dr. Teresa Wan in January for echo results.     Elsa Saunders, NP

## 2017-11-01 NOTE — PATIENT INSTRUCTIONS

## 2017-11-21 ENCOUNTER — PATIENT OUTREACH (OUTPATIENT)
Dept: CARDIOLOGY CLINIC | Age: 67
End: 2017-11-21

## 2017-11-21 NOTE — PROGRESS NOTES
Called pt to follow up on chronic condition. LVM stating my name, NN at [facility], date and time of call, and return telephone number.

## 2017-12-05 ENCOUNTER — OFFICE VISIT (OUTPATIENT)
Dept: CARDIOLOGY CLINIC | Age: 67
End: 2017-12-05

## 2017-12-05 VITALS
BODY MASS INDEX: 29.65 KG/M2 | OXYGEN SATURATION: 94 % | RESPIRATION RATE: 16 BRPM | HEART RATE: 84 BPM | HEIGHT: 67 IN | DIASTOLIC BLOOD PRESSURE: 80 MMHG | SYSTOLIC BLOOD PRESSURE: 108 MMHG | WEIGHT: 188.9 LBS

## 2017-12-05 DIAGNOSIS — I10 ACCELERATED HYPERTENSION: ICD-10-CM

## 2017-12-05 DIAGNOSIS — I42.8 NICM (NONISCHEMIC CARDIOMYOPATHY) (HCC): Primary | ICD-10-CM

## 2017-12-05 DIAGNOSIS — J44.9 CHRONIC OBSTRUCTIVE PULMONARY DISEASE, UNSPECIFIED COPD TYPE (HCC): ICD-10-CM

## 2017-12-05 RX ORDER — CARVEDILOL 3.12 MG/1
TABLET ORAL
Refills: 0 | COMMUNITY
Start: 2017-10-14 | End: 2017-12-05 | Stop reason: ALTCHOICE

## 2017-12-05 RX ORDER — PRAVASTATIN SODIUM 10 MG/1
10 TABLET ORAL
Qty: 90 TAB | Refills: 3 | Status: SHIPPED | OUTPATIENT
Start: 2017-12-05 | End: 2018-11-22 | Stop reason: SDUPTHER

## 2017-12-05 RX ORDER — ASPIRIN 325 MG
81 TABLET ORAL DAILY
COMMUNITY
End: 2020-02-12

## 2017-12-05 RX ORDER — POTASSIUM CHLORIDE 1500 MG/1
TABLET, FILM COATED, EXTENDED RELEASE ORAL
Refills: 1 | COMMUNITY
Start: 2017-11-01 | End: 2017-12-05 | Stop reason: SDUPTHER

## 2017-12-05 NOTE — PROGRESS NOTES
1. Have you been to the ER, urgent care clinic since your last visit? Hospitalized since your last visit? No    2. Have you seen or consulted any other health care providers outside of the [address] [place] [address] since your last visit? Include any pap smears or colon screening.  No

## 2017-12-05 NOTE — LETTER
12/5/2017 3:54 PM 
 
Patient:  Eduardo Aparicio YOB: 1950 Date of Visit: 12/5/2017 Dear Letitia Hollins MD 
[address] [address] [address] VIA Facsimile: [phone] 
 : Thank you for referring Mr. Natasha Landry to me for evaluation/treatment. Below are the relevant portions of my assessment and plan of care. If you have questions, please do not hesitate to call me. I look forward to following Mr. Josefa Crum along with you. Sincerely, Juanis Aldana NP

## 2017-12-05 NOTE — PROGRESS NOTES
Celeste Rodriguez DNP, ANP-BC  Subjective/HPI:     Justin Osman is a 61 y.o. male is here for heart failure follow-up. Nonischemic cardiomyopathy. Since his last consultation with me, he is maintained carvedilol 6.25 mg twice a day, 40 mg of Lasix daily without the need for an additional 20 mg, Diovan 80 mg twice a day. He has lost the 10 pounds that he gained over short period due to dietary indiscretion. He is returned to work and feels well. He is able to walk and perform his usual activities of daily life without significant limitations, he presents today without the need for supplemental nasal oxygen. PCP Provider  Michael Dixon MD  Past Medical History:   Diagnosis Date    Anemia 10/13/2017    Arthritis     Chronic obstructive pulmonary disease (HCC)     Chronic pain     back    GERD (gastroesophageal reflux disease)     Hypertension     Hyponatremia 10/13/2017    Ill-defined condition     Gout    Psychiatric disorder     Generalized Anxiety Disorder    Psychiatric disorder     ETOH abuse      Past Surgical History:   Procedure Laterality Date    HX COLECTOMY  2000    diverticulitis    HX OTHER SURGICAL Left     mastoidectomy and inner ear surgery- age  18   [employer] OTHER SURGICAL      [facility]    HX TONSILLECTOMY      KNEE SCOPE,MED OR LAT MENIS REPAIR Left      Allergies   Allergen Reactions    Adhesive Tape-Silicones Other (comments)     Pulls skin off    Sulfa (Sulfonamide Antibiotics) Shortness of Breath      Family History   Problem Relation Age of Onset    Cancer Mother      lung    Arthritis-osteo Mother     Cancer Father      throat    Cancer Brother      prostate      Current Outpatient Prescriptions   Medication Sig    aspirin (ASPIRIN) 325 mg tablet Take 325 mg by mouth daily.  pravastatin (PRAVACHOL) 10 mg tablet Take 1 Tab by mouth nightly.  furosemide (LASIX) 40 mg tablet Take 1 Tab by mouth daily.  May take 1/2 tab daily PRN for increased edema  Indications: Pulmonary Edema due to Chronic Heart Failure    potassium chloride (K-DUR, KLOR-CON) 20 mEq tablet Take 1 Tab by mouth daily.  carvedilol (COREG) 6.25 mg tablet Take 1 Tab by mouth two (2) times daily (with meals).  valsartan (DIOVAN) 160 mg tablet Take 80 mg by mouth two (2) times a day.  ketoconazole (NIZORAL) 2 % shampoo Apply  to affected area daily as needed for Itching. Apply to scalp and face up to three times a week as needed for flaky skin    oxyCODONE-acetaminophen (PERCOCET 10)  mg per tablet Take 1 Tab by mouth three (3) times daily as needed for Pain (Back pain).  loratadine (CLARITIN) 10 mg tablet Take 10 mg by mouth.  diclofenac EC (VOLTAREN) 75 mg EC tablet Take 75 mg by mouth two (2) times a day.  allopurinol (ZYLOPRIM) 300 mg tablet Take 300 mg by mouth daily.  omeprazole (PRILOSEC) 20 mg capsule Take 20 mg by mouth daily.  busPIRone (BUSPAR) 15 mg tablet Take 15 mg by mouth three (3) times daily.  citalopram (CELEXA) 40 mg tablet Take 40 mg by mouth daily.  budesonide-formoterol (SYMBICORT) 160-4.5 mcg/actuation HFA inhaler Take 2 Puffs by inhalation two (2) times daily as needed (Shortness of Breath).  tiotropium (SPIRIVA) 18 mcg inhalation capsule Take 1 Cap by inhalation daily.  albuterol (PROVENTIL HFA, VENTOLIN HFA, PROAIR HFA) 90 mcg/actuation inhaler Take 2 Puffs by inhalation daily as needed for Wheezing or Shortness of Breath.  tadalafil (CIALIS) 20 mg tablet Take 20 mg by mouth as needed.  pantoprazole (PROTONIX) 40 mg tablet Take 1 Tab by mouth Daily (before breakfast).  testosterone cypionate (DEPOTESTOTERONE CYPIONATE) 200 mg/mL injection 200 mg by IntraMUSCular route every fourteen (14) days. No current facility-administered medications for this visit.        Vitals:    12/05/17 1506 12/05/17 1513   BP: 110/82 108/80   Pulse: 84    Resp: 16    SpO2: 94%    Weight: 188 lb 14.4 oz (85.7 kg)    Height: 5' 7\" (1.702 m)      Social History     Social History    Marital status:      Spouse name: N/A    Number of children: N/A    Years of education: N/A     Occupational History    Not on file. Social History Main Topics    Smoking status: Former Smoker     Packs/day: 2.50     Years: 29.00     Quit date: 2/17/1993    Smokeless tobacco: Never Used    Alcohol use No      Comment: quit march 15 2017 reports was more than 21 beers  per week     Drug use: No    Sexual activity: Not on file     Other Topics Concern    Not on file     Social History Narrative       I have reviewed the nurses notes, vitals, problem list, allergy list, medical history, family, social history and medications. Review of Symptoms:    General: Pt denies excessive weight gain or loss. Pt is able to conduct ADL's  HEENT: Denies blurred vision, headaches, epistaxis and difficulty swallowing. Respiratory: Denies shortness of breath, COWART, wheezing or stridor. Cardiovascular: Denies precordial pain, palpitations, edema or PND  Gastrointestinal: Denies poor appetite, indigestion, abdominal pain or blood in stool  Musculoskeletal: Denies pain or swelling from muscles or joints  Neurologic: Denies tremor, paresthesias, or sensory motor disturbance  Skin: Denies rash, itching or texture change. Physical Exam:      General: Well developed, in no acute distress, cooperative and alert  HEENT: No carotid bruits, no JVD, trach is midline. Neck Supple, PEERL, EOM intact. Heart:  Normal S1/S2 negative S3 or S4. Regular, no murmur, gallop or rub.   Respiratory: Clear bilaterally x 4, no wheezing or rales  Abdomen:   Soft, non-tender, no masses, bowel sounds are active.   Extremities:  No edema, normal cap refill, no cyanosis, atraumatic. Neuro: A&Ox3, speech clear, gait stable. Skin: Skin color is normal. No rashes or lesions.  Non diaphoretic  Vascular: 2+ pulses symmetric in all extremities    Cardiographics    ECG:   Results for orders placed or performed during the hospital encounter of 10/11/17   EKG, 12 LEAD, INITIAL   Result Value Ref Range    Ventricular Rate 119 BPM    Atrial Rate 119 BPM    P-R Interval 176 ms    QRS Duration 96 ms    Q-T Interval 314 ms    QTC Calculation (Bezet) 441 ms    Calculated P Axis 6 degrees    Calculated R Axis 24 degrees    Calculated T Axis -14 degrees    Diagnosis       Sinus tachycardia  When compared with ECG of 12-SEP-2017 10:07,  Vent. rate has increased BY  57 BPM  T wave inversion now evident in Inferior leads  Confirmed by Shawn Carbera ([id number]) on 10/11/2017 12:05:29 PM           Cardiology Labs:  Lab Results   Component Value Date/Time    Cholesterol, total 133 10/12/2017 03:04 AM    HDL Cholesterol 57 10/12/2017 03:04 AM    LDL, calculated 64.2 10/12/2017 03:04 AM    Triglyceride 59 10/12/2017 03:04 AM    CHOL/HDL Ratio 2.3 10/12/2017 03:04 AM       Lab Results   Component Value Date/Time    Sodium 129 10/13/2017 03:25 AM    Potassium 3.3 10/13/2017 03:25 AM    Chloride 92 10/13/2017 03:25 AM    CO2 29 10/13/2017 03:25 AM    Anion gap 8 10/13/2017 03:25 AM    Glucose 112 10/13/2017 03:25 AM    BUN 19 10/13/2017 03:25 AM    Creatinine 0.61 10/13/2017 03:25 AM    BUN/Creatinine ratio 31 10/13/2017 03:25 AM    GFR est AA >60 10/13/2017 03:25 AM    GFR est non-AA >60 10/13/2017 03:25 AM    Calcium 8.5 10/13/2017 03:25 AM    Bilirubin, total 0.3 10/11/2017 07:43 AM    AST (SGOT) 18 10/11/2017 07:43 AM    Alk. phosphatase 115 10/11/2017 07:43 AM    Protein, total 7.3 10/11/2017 07:43 AM    Albumin 3.2 10/11/2017 07:43 AM    Globulin 4.1 10/11/2017 07:43 AM    A-G Ratio 0.8 10/11/2017 07:43 AM    ALT (SGPT) 25 10/11/2017 07:43 AM           Assessment:     Assessment:     Diagnoses and all orders for this visit:    1. NICM (nonischemic cardiomyopathy) ([facility]    2. Accelerated hypertension  -     AMB POC EKG ROUTINE W/ 12 LEADS, INTER & REP    3. Chronic obstructive pulmonary disease, unspecified COPD type ([facility]    Other orders  -     pravastatin (PRAVACHOL) 10 mg tablet; Take 1 Tab by mouth nightly. ICD-10-CM ICD-9-CM    1. NICM (nonischemic cardiomyopathy) (HCC) I42.8 425.4    2. Accelerated hypertension I10 401.0 AMB POC EKG ROUTINE W/ 12 LEADS, INTER & REP   3. Chronic obstructive pulmonary disease, unspecified COPD type ([facility] J44.9 496      Orders Placed This Encounter    AMB POC EKG ROUTINE W/ 12 LEADS, INTER & REP     Order Specific Question:   Reason for Exam:     Answer:   Routine    DISCONTD: potassium chloride SR (K-TAB) 20 mEq tablet     Sig: TAKE 1 TABLET BY MOUTH DAILY     Refill:  1    DISCONTD: carvedilol (COREG) 3.125 mg tablet     Sig: TAKE 1 TABLET BY MOUTH TWICE A DAY WITH MEALS     Refill:  0    aspirin (ASPIRIN) 325 mg tablet     Sig: Take 325 mg by mouth daily.  pravastatin (PRAVACHOL) 10 mg tablet     Sig: Take 1 Tab by mouth nightly. Dispense:  90 Tab     Refill:  3        Plan:     1. Nonischemic cardiomyopathy: Pending 90 day echocardiogram on January 12. Continue current dosing of medications. 2.  Hyperlipidemia: Was placed on statin therapy for risk modifications, his starting LDL was 64.2 before atorvastatin. He is concerned with research regarding memory loss especially on atorvastatin. He has been off of lipid medicine for 1 month. We will change to Pravachol 10 mg nightly, at his appointment in January he will get a lab slip to recheck his lipids in March. Continue current therapy follow-up in 1 month. Mark Anthony Thomas NP    This note was created using voice recognition software. Despite editing, there may be syntax errors.

## 2018-01-12 ENCOUNTER — CLINICAL SUPPORT (OUTPATIENT)
Dept: CARDIOLOGY CLINIC | Age: 68
End: 2018-01-12

## 2018-01-12 ENCOUNTER — OFFICE VISIT (OUTPATIENT)
Dept: CARDIOLOGY CLINIC | Age: 68
End: 2018-01-12

## 2018-01-12 VITALS
BODY MASS INDEX: 30.15 KG/M2 | WEIGHT: 192.1 LBS | RESPIRATION RATE: 18 BRPM | SYSTOLIC BLOOD PRESSURE: 128 MMHG | OXYGEN SATURATION: 98 % | DIASTOLIC BLOOD PRESSURE: 76 MMHG | HEART RATE: 72 BPM | HEIGHT: 67 IN

## 2018-01-12 DIAGNOSIS — I10 ACCELERATED HYPERTENSION: ICD-10-CM

## 2018-01-12 DIAGNOSIS — I42.8 NICM (NONISCHEMIC CARDIOMYOPATHY) (HCC): Primary | ICD-10-CM

## 2018-01-12 DIAGNOSIS — J44.9 CHRONIC OBSTRUCTIVE PULMONARY DISEASE, UNSPECIFIED COPD TYPE (HCC): ICD-10-CM

## 2018-01-12 DIAGNOSIS — I42.8 NICM (NONISCHEMIC CARDIOMYOPATHY) (HCC): ICD-10-CM

## 2018-01-12 RX ORDER — ATORVASTATIN CALCIUM 20 MG/1
TABLET, FILM COATED ORAL
Refills: 0 | COMMUNITY
Start: 2017-10-14 | End: 2018-07-05 | Stop reason: ALTCHOICE

## 2018-01-12 NOTE — PROGRESS NOTES
1. Have you been to the ER, urgent care clinic since your last visit? Hospitalized since your last visit? No    2. Have you seen or consulted any other health care providers outside of the [address] [place] [address] since your last visit? Include any pap smears or colon screening.  Yes, Dr. Pablo Hanson for routine visits x 1 wk ago    Chief Complaint   Patient presents with    Cardiomyopathy     1 mo f/u    Cholesterol Problem     \"    Hypertension     \"

## 2018-01-12 NOTE — PROGRESS NOTES
Edvin Martel DNP, ANP-BC  Subjective/HPI:     Haile Tavera is a 74 y.o. male is here for routine f/u. Preliminary echocardiogram this morning shows his ejection fraction to be 49%. Mr. Kolby Stevenson reports he is feeling great, is pleased with his weight loss, balance of medications. Has only required an additional 20 mg of furosemide a total of 4 times since his hospitalization. He is tolerating Pravachol 10 mg, is not reporting any short-term memory concerns.   + Baseline secondary to existing COPD    Patient Active Problem List   Diagnosis Code    Degenerative joint disease of right hip M16.11    Hip arthritis M16.10    Pulmonary edema J81.1    Accelerated hypertension I10    Elevated troponin R74.8    Acute respiratory failure with hypoxia ([facility]) J96.01    COPD (chronic obstructive pulmonary disease) ([facility]) J44.9    NICM (nonischemic cardiomyopathy) ([facility]) D79.6    Acute systolic congestive heart failure, NYHA class 4 ([facility]) I50.21    PAT (paroxysmal atrial tachycardia) ([facility]) I47.1    Hyponatremia E87.1    Anemia D64.9         PCP Provider  Katy Osei MD  Past Medical History:   Diagnosis Date    Anemia 10/13/2017    Arthritis     Chronic obstructive pulmonary disease ([facility])     Chronic pain     back    GERD (gastroesophageal reflux disease)     Hypertension     Hyponatremia 10/13/2017    Ill-defined condition     Gout    Psychiatric disorder     Generalized Anxiety Disorder    Psychiatric disorder     ETOH abuse      Past Surgical History:   Procedure Laterality Date    HX COLECTOMY  2000    diverticulitis    HX OTHER SURGICAL Left     mastoidectomy and inner ear surgery- age  18   [de-identified] OTHER SURGICAL      [facility] stem cell [facility]    HX TONSILLECTOMY      KNEE SCOPE,MED OR LAT MENIS REPAIR Left      Allergies   Allergen Reactions    Adhesive Tape-Silicones Other (comments)     Pulls skin off    Sulfa (Sulfonamide Antibiotics) Shortness of Breath      Family History   Problem Relation Age of Onset   [place] Cancer Mother      lung    Arthritis-osteo Mother     Cancer Father      throat    Cancer Brother      prostate      Current Outpatient Prescriptions   Medication Sig    aspirin (ASPIRIN) 325 mg tablet Take 325 mg by mouth daily.  pravastatin (PRAVACHOL) 10 mg tablet Take 1 Tab by mouth nightly.  furosemide (LASIX) 40 mg tablet Take 1 Tab by mouth daily. May take 1/2 tab daily PRN for increased edema  Indications: Pulmonary Edema due to Chronic Heart Failure    potassium chloride (K-DUR, KLOR-CON) 20 mEq tablet Take 1 Tab by mouth daily.  carvedilol (COREG) 6.25 mg tablet Take 1 Tab by mouth two (2) times daily (with meals).  valsartan (DIOVAN) 160 mg tablet Take 80 mg by mouth two (2) times a day.  ketoconazole (NIZORAL) 2 % shampoo Apply  to affected area daily as needed for Itching. Apply to scalp and face up to three times a week as needed for flaky skin    oxyCODONE-acetaminophen (PERCOCET 10)  mg per tablet Take 1 Tab by mouth three (3) times daily as needed for Pain (Back pain).  loratadine (CLARITIN) 10 mg tablet Take 10 mg by mouth.  diclofenac EC (VOLTAREN) 75 mg EC tablet Take 75 mg by mouth two (2) times a day.  allopurinol (ZYLOPRIM) 300 mg tablet Take 300 mg by mouth daily.  omeprazole (PRILOSEC) 20 mg capsule Take 20 mg by mouth daily.  busPIRone (BUSPAR) 15 mg tablet Take 15 mg by mouth three (3) times daily.  citalopram (CELEXA) 40 mg tablet Take 40 mg by mouth daily.  budesonide-formoterol (SYMBICORT) 160-4.5 mcg/actuation HFA inhaler Take 2 Puffs by inhalation two (2) times daily as needed (Shortness of Breath).  tiotropium (SPIRIVA) 18 mcg inhalation capsule Take 1 Cap by inhalation daily.  albuterol (PROVENTIL HFA, VENTOLIN HFA, PROAIR HFA) 90 mcg/actuation inhaler Take 2 Puffs by inhalation daily as needed for Wheezing or Shortness of Breath.     atorvastatin (LIPITOR) 20 mg tablet TAKE 1 TABLET BY MOUTH DAILY    tadalafil (CIALIS) 20 mg tablet Take 20 mg by mouth as needed.  pantoprazole (PROTONIX) 40 mg tablet Take 1 Tab by mouth Daily (before breakfast).  testosterone cypionate (DEPOTESTOTERONE CYPIONATE) 200 mg/mL injection 200 mg by IntraMUSCular route every fourteen (14) days. No current facility-administered medications for this visit. Vitals:    01/12/18 1020 01/12/18 1030   BP: 112/70 128/76   Pulse: 72    Resp: 18    SpO2: 98%    Weight: 192 lb 1.6 oz (87.1 kg)    Height: 5' 7\" (1.702 m)      Social History     Social History    Marital status:      Spouse name: N/A    Number of children: N/A    Years of education: N/A     Occupational History    Not on file. Social History Main Topics    Smoking status: Former Smoker     Packs/day: 2.50     Years: 29.00     Quit date: 2/17/1993    Smokeless tobacco: Never Used    Alcohol use No      Comment: quit march 15 2017 reports was more than 21 beers  per week     Drug use: No    Sexual activity: Not on file     Other Topics Concern    Not on file     Social History Narrative       I have reviewed the nurses notes, vitals, problem list, allergy list, medical history, family, social history and medications. Review of Symptoms:    General: Pt denies excessive weight gain or loss. Pt is able to conduct ADL's  HEENT: Denies blurred vision, headaches, epistaxis and difficulty swallowing. Respiratory: Denies shortness of breath,+ Baseline secondary to existing COPD   COWART, wheezing or stridor. Cardiovascular: Denies precordial pain, palpitations, edema or PND  Gastrointestinal: Denies poor appetite, indigestion, abdominal pain or blood in stool  Musculoskeletal: Denies pain or swelling from muscles or joints  Neurologic: Denies tremor, paresthesias, or sensory motor disturbance  Skin: Denies rash, itching or texture change.       Physical Exam:      General: Well developed, in no acute distress, cooperative and alert  HEENT: No carotid bruits, no JVD, trach is midline. Neck Supple, PEERL, EOM intact. Heart:  Normal S1/S2 negative S3 or S4. Regular, no murmur, gallop or rub.   Respiratory: moderately diminished at bases bilaterally, there is a scant RLL exp wheeze  Abdomen:   Soft, non-tender, no masses, bowel sounds are active.   Extremities:  No edema, normal cap refill, no cyanosis, atraumatic. Neuro: A&Ox3, speech clear, gait stable. Skin: Skin color is normal. No rashes or lesions. Non diaphoretic  Vascular: 2+ pulses symmetric in all extremities    Cardiographics    ECG: NSR   Results for orders placed or performed during the hospital encounter of 10/11/17   EKG, 12 LEAD, INITIAL   Result Value Ref Range    Ventricular Rate 119 BPM    Atrial Rate 119 BPM    P-R Interval 176 ms    QRS Duration 96 ms    Q-T Interval 314 ms    QTC Calculation (Bezet) 441 ms    Calculated P Axis 6 degrees    Calculated R Axis 24 degrees    Calculated T Axis -14 degrees    Diagnosis       Sinus tachycardia  When compared with ECG of 12-SEP-2017 10:07,  Vent.  rate has increased BY  57 BPM  T wave inversion now evident in Inferior leads  Confirmed by Cristhian Kincaid ([id number]) on 10/11/2017 12:05:29 PM           Cardiology Labs:  Lab Results   Component Value Date/Time    Cholesterol, total 133 10/12/2017 03:04 AM    HDL Cholesterol 57 10/12/2017 03:04 AM    LDL, calculated 64.2 10/12/2017 03:04 AM    Triglyceride 59 10/12/2017 03:04 AM    CHOL/HDL Ratio 2.3 10/12/2017 03:04 AM       Lab Results   Component Value Date/Time    Sodium 129 10/13/2017 03:25 AM    Potassium 3.3 10/13/2017 03:25 AM    Chloride 92 10/13/2017 03:25 AM    CO2 29 10/13/2017 03:25 AM    Anion gap 8 10/13/2017 03:25 AM    Glucose 112 10/13/2017 03:25 AM    BUN 19 10/13/2017 03:25 AM    Creatinine 0.61 10/13/2017 03:25 AM    BUN/Creatinine ratio 31 10/13/2017 03:25 AM    GFR est AA >60 10/13/2017 03:25 AM    GFR est non-AA >60 10/13/2017 03:25 AM Calcium 8.5 10/13/2017 03:25 AM    Bilirubin, total 0.3 10/11/2017 07:43 AM    AST (SGOT) 18 10/11/2017 07:43 AM    Alk. phosphatase 115 10/11/2017 07:43 AM    Protein, total 7.3 10/11/2017 07:43 AM    Albumin 3.2 10/11/2017 07:43 AM    Globulin 4.1 10/11/2017 07:43 AM    A-G Ratio 0.8 10/11/2017 07:43 AM    ALT (SGPT) 25 10/11/2017 07:43 AM           Assessment:     Assessment:     Diagnoses and all orders for this visit:    1. NICM (nonischemic cardiomyopathy) ([facility]    2. Accelerated hypertension  -     AMB POC EKG ROUTINE W/ 12 LEADS, INTER & REP    3. Chronic obstructive pulmonary disease, unspecified COPD type ([facility].)        ICD-10-CM ICD-9-CM    1. NICM (nonischemic cardiomyopathy) ([facility]) I42.8 425.4    2. Accelerated hypertension I10 401.0 AMB POC EKG ROUTINE W/ 12 LEADS, INTER & REP   3. Chronic obstructive pulmonary disease, unspecified COPD type ([facility] J44.9 496      Orders Placed This Encounter    AMB POC EKG ROUTINE W/ 12 LEADS, INTER & REP     Order Specific Question:   Reason for Exam:     Answer:   Routine    atorvastatin (LIPITOR) 20 mg tablet     Sig: TAKE 1 TABLET BY MOUTH DAILY     Refill:  0        Plan:     Patient is a 46-year-old male with a history of nonischemic cardiomyopathy 90 days ago his ejection fraction was 15-25%, with current therapy he is now 49% ejection fraction. 1.  Discontinue LifeVest and return to company. Suspected combination of stress induced (due to COPD) and alcoholic cardiomyopathy. 2.  Hypertension: Controlled continue current medications. 3.  Systolic heart failure: Compensated, [place] heart class I. Continue current dosing of Diovan, diuretic and carvedilol. Encouraged to return to exercising low impact as tolerated with underlying COPD. Patient looking forward to getting back to playing golf. 4.  Mild, nonobstructive CAD/mixed hyperlipidemia:  Lipids at goal on 10 mg of Lipitor.   Discussed that cognitive dysfunction is extremely rare, and that statins are not linked to Alzheimer's. He said he has no symptoms at this time. 5.  Counseled on diet and exercise- eventual goal of 30-60 minutes 5-7 times a week as per AHA guidelines. Follow-up in 3 months. Ayana Montes MD    This note was created using voice recognition software. Despite editing, there may be syntax errors.

## 2018-01-12 NOTE — MR AVS SNAPSHOT
Visit Information Date & Time Provider Department Dept. Phone Encounter #  
 1/12/2018 10:45 AM Comfort Jones, [address] [facility] [phone] [id number] Upcoming Health Maintenance Date Due Hepatitis C Screening 1950 DTaP/Tdap/Td series (1 - Tdap) 12/16/1973 FOBT Q 1 YEAR AGE 50-75 12/16/2002 ZOSTER VACCINE AGE 60> 10/16/2012 GLAUCOMA SCREENING Q2Y 12/16/2017 Pneumococcal 65+ High/Highest Risk (1 of 2 - PCV13) 12/16/2017 MEDICARE YEARLY EXAM 12/16/2017 Allergies as of 1/12/2018  Review Complete On: 1/12/2018 By: Comfort Jones MD  
  
 Severity Noted Reaction Type Reactions Adhesive Tape-silicones  99/90/4434    Other (comments) Pulls skin off  
 Sulfa (Sulfonamide Antibiotics)  02/17/2016    Shortness of Breath Current Immunizations  Never Reviewed Name Date Influenza Vaccine (Quad) PF 10/12/2017  8:01 AM  
  
 Not reviewed this visit You Were Diagnosed With   
  
 Codes Comments NICM (nonischemic cardiomyopathy) ([facility]    -  Primary ICD-10-CM: I42.8 ICD-9-CM: 425.4 Accelerated hypertension     ICD-10-CM: I10 
ICD-9-CM: 401.0 Chronic obstructive pulmonary disease, unspecified COPD type ([facility]     ICD-10-CM: J44.9 ICD-9-CM: 219 Vitals BP Pulse Resp Height(growth percentile) Weight(growth percentile) SpO2  
 128/76 (BP 1 Location: Left arm, BP Patient Position: Sitting) 72 18 5' 7\" (1.702 m) 192 lb 1.6 oz (87.1 kg) 98% BMI Smoking Status 30.09 kg/m2 Former Smoker Vitals History BMI and BSA Data Body Mass Index Body Surface Area 30.09 kg/m 2 2.03 m 2 Preferred Pharmacy Pharmacy Name Phone [id number] [facility] [phone] Your Updated Medication List  
  
   
This list is accurate as of: 1/12/18 11:18 AM.  Always use your most recent med list.  
  
  
  
  
 albuterol 90 mcg/actuation inhaler Commonly known as:  PROVENTIL HFA, VENTOLIN HFA, PROAIR HFA Take 2 Puffs by inhalation daily as needed for Wheezing or Shortness of Breath. allopurinol 300 mg tablet Commonly known as:  [place] Aga Take 300 mg by mouth daily. aspirin 325 mg tablet Commonly known as:  ASPIRIN Take 325 mg by mouth daily. atorvastatin 20 mg tablet Commonly known as:  LIPITOR  
TAKE 1 TABLET BY MOUTH DAILY  
  
 budesonide-formoterol 160-4.5 mcg/actuation Hfaa Commonly known as:  SYMBICORT Take 2 Puffs by inhalation two (2) times daily as needed (Shortness of Breath). busPIRone 15 mg tablet Commonly known as:  BUSPAR Take 15 mg by mouth three (3) times daily. carvedilol 6.25 mg tablet Commonly known as:  Shirley Rings Take 1 Tab by mouth two (2) times daily (with meals). CIALIS 20 mg tablet Generic drug:  tadalafil Take 20 mg by mouth as needed. citalopram 40 mg tablet Commonly known as:  Francies [place] Take 40 mg by mouth daily. CLARITIN 10 mg tablet Generic drug:  loratadine Take 10 mg by mouth. diclofenac EC 75 mg EC tablet Commonly known as:  VOLTAREN Take 75 mg by mouth two (2) times a day. furosemide 40 mg tablet Commonly known as:  LASIX Take 1 Tab by mouth daily. May take 1/2 tab daily PRN for increased edema  Indications: Pulmonary Edema due to Chronic Heart Failure  
  
 ketoconazole 2 % shampoo Commonly known as:  NIZORAL Apply  to affected area daily as needed for Itching. Apply to scalp and face up to three times a week as needed for flaky skin  
  
 omeprazole 20 mg capsule Commonly known as:  PRILOSEC Take 20 mg by mouth daily. oxyCODONE-acetaminophen  mg per tablet Commonly known as:  PERCOCET 10 Take 1 Tab by mouth three (3) times daily as needed for Pain (Back pain). pantoprazole 40 mg tablet Commonly known as:  PROTONIX Take 1 Tab by mouth Daily (before breakfast). potassium chloride 20 mEq tablet Commonly known as:  K-DUR, KLOR-CON Take 1 Tab by mouth daily. pravastatin 10 mg tablet Commonly known as:  PRAVACHOL Take 1 Tab by mouth nightly. testosterone cypionate 200 mg/mL injection Commonly known as:  DEPOTESTOTERONE CYPIONATE  
200 mg by IntraMUSCular route every fourteen (14) days. tiotropium 18 mcg inhalation capsule Commonly known as:  Fonnie Ditty Take 1 Cap by inhalation daily. valsartan 160 mg tablet Commonly known as:  DIOVAN Take 80 mg by mouth two (2) times a day. We Performed the Following AMB POC EKG ROUTINE W/ 12 LEADS, INTER & REP [43759 CPT(R)] Introducing [facility] SERVICES! Sonia Lovelace introduces [employer] patient portal. Now you can access parts of your medical record, email your doctor's office, and request medication refills online. 1. In your internet browser, go to [email or web address] 2. Click on the First Time User? Click Here link in the Sign In box. You will see the New Member Sign Up page. 3. Enter your Ninite Access Code exactly as it appears below. You will not need to use this code after youve completed the sign-up process. If you do not sign up before the expiration date, you must request a new code. · Ninite Access Code: [email or web address] Expires: 4/12/2018  9:53 AM 
 
4. Enter the last four digits of your Social Security Number (xxxx) and Date of Birth (mm/dd/yyyy) as indicated and click Submit. You will be taken to the next sign-up page. 5. Create a [employer] TVt ID. This will be your Ninite login ID and cannot be changed, so think of one that is secure and easy to remember. 6. Create a Ninite password. You can change your password at any time. 7. Enter your Password Reset Question and Answer. This can be used at a later time if you forget your password. 8. Enter your e-mail address. You will receive e-mail notification when new information is available in [address] 9. Click Sign Up. You can now view and download portions of your medical record. 10. Click the Download Summary menu link to download a portable copy of your medical information. If you have questions, please visit the Frequently Asked Questions section of the [employer] website. Remember, [employer] is NOT to be used for urgent needs. For medical emergencies, dial 911. Now available from your iPhone and Android! Please provide this summary of care documentation to your next provider. Your primary care clinician is listed as Radha Damon. If you have any questions after today's visit, please call [phone].

## 2018-01-26 ENCOUNTER — HOSPITAL ENCOUNTER (OUTPATIENT)
Dept: GENERAL RADIOLOGY | Age: 68
Discharge: HOME | End: 2018-01-26
Attending: INTERNAL MEDICINE
Payer: COMMERCIAL

## 2018-01-26 DIAGNOSIS — D47.2 MONOCLONAL PARAPROTEINEMIA: ICD-10-CM

## 2018-01-26 PROCEDURE — 77075 RADEX OSSEOUS SURVEY COMPL: CPT

## 2018-02-19 NOTE — PROGRESS NOTES
Pre call completed with patient regarding upcoming procedure. Patient states his LD of Aspirin 325 mg was 2/17/2018.

## 2018-02-20 ENCOUNTER — HOSPITAL ENCOUNTER (OUTPATIENT)
Dept: CT IMAGING | Age: 68
Discharge: HOME | End: 2018-02-20
Attending: INTERNAL MEDICINE
Payer: COMMERCIAL

## 2018-02-20 VITALS
HEIGHT: 68 IN | OXYGEN SATURATION: 98 % | DIASTOLIC BLOOD PRESSURE: 58 MMHG | RESPIRATION RATE: 18 BRPM | SYSTOLIC BLOOD PRESSURE: 99 MMHG | HEART RATE: 80 BPM | BODY MASS INDEX: 28.04 KG/M2 | WEIGHT: 185 LBS | TEMPERATURE: 98 F

## 2018-02-20 DIAGNOSIS — D47.2 MONOCLONAL GAMMOPATHY: ICD-10-CM

## 2018-02-20 LAB
BASOPHILS # BLD: 0 K/UL (ref 0–0.1)
BASOPHILS NFR BLD: 0 % (ref 0–1)
DIFFERENTIAL METHOD BLD: ABNORMAL
EOSINOPHIL # BLD: 0.3 K/UL (ref 0–0.4)
EOSINOPHIL NFR BLD: 5 % (ref 0–7)
ERYTHROCYTE [DISTWIDTH] IN BLOOD BY AUTOMATED COUNT: 15.1 % (ref 11.5–14.5)
HCT VFR BLD AUTO: 34.5 % (ref 36.6–50.3)
HGB BLD-MCNC: 11.7 G/DL (ref 12.1–17)
IMM GRANULOCYTES # BLD: 0 K/UL (ref 0–0.04)
IMM GRANULOCYTES NFR BLD AUTO: 0 % (ref 0–0.5)
LYMPHOCYTES # BLD: 1.1 K/UL (ref 0.8–3.5)
LYMPHOCYTES NFR BLD: 21 % (ref 12–49)
MCH RBC QN AUTO: 31.5 PG (ref 26–34)
MCHC RBC AUTO-ENTMCNC: 33.9 G/DL (ref 30–36.5)
MCV RBC AUTO: 92.7 FL (ref 80–99)
MONOCYTES # BLD: 0.5 K/UL (ref 0–1)
MONOCYTES NFR BLD: 9 % (ref 5–13)
NEUTS SEG # BLD: 3.3 K/UL (ref 1.8–8)
NEUTS SEG NFR BLD: 64 % (ref 32–75)
NRBC # BLD: 0 K/UL (ref 0–0.01)
NRBC BLD-RTO: 0 PER 100 WBC
PLATELET # BLD AUTO: 299 K/UL (ref 150–400)
PMV BLD AUTO: 8.7 FL (ref 8.9–12.9)
RBC # BLD AUTO: 3.72 M/UL (ref 4.1–5.7)
WBC # BLD AUTO: 5.2 K/UL (ref 4.1–11.1)

## 2018-02-20 PROCEDURE — 88365 INSITU HYBRIDIZATION (FISH): CPT | Performed by: INTERNAL MEDICINE

## 2018-02-20 PROCEDURE — 88342 IMHCHEM/IMCYTCHM 1ST ANTB: CPT | Performed by: INTERNAL MEDICINE

## 2018-02-20 PROCEDURE — 74011000250 HC RX REV CODE- 250: Performed by: RADIOLOGY

## 2018-02-20 PROCEDURE — 74011250636 HC RX REV CODE- 250/636: Performed by: RADIOLOGY

## 2018-02-20 PROCEDURE — 85097 BONE MARROW INTERPRETATION: CPT | Performed by: INTERNAL MEDICINE

## 2018-02-20 PROCEDURE — 88313 SPECIAL STAINS GROUP 2: CPT | Performed by: INTERNAL MEDICINE

## 2018-02-20 PROCEDURE — 88311 DECALCIFY TISSUE: CPT | Performed by: INTERNAL MEDICINE

## 2018-02-20 PROCEDURE — 36415 COLL VENOUS BLD VENIPUNCTURE: CPT | Performed by: INTERNAL MEDICINE

## 2018-02-20 PROCEDURE — 88305 TISSUE EXAM BY PATHOLOGIST: CPT | Performed by: INTERNAL MEDICINE

## 2018-02-20 PROCEDURE — 88341 IMHCHEM/IMCYTCHM EA ADD ANTB: CPT | Performed by: INTERNAL MEDICINE

## 2018-02-20 PROCEDURE — 77030028872 HC BN BIOP NDL ON CNTRL TY TELE -C

## 2018-02-20 PROCEDURE — 85025 COMPLETE CBC W/AUTO DIFF WBC: CPT | Performed by: INTERNAL MEDICINE

## 2018-02-20 PROCEDURE — 38221 DX BONE MARROW BIOPSIES: CPT

## 2018-02-20 RX ORDER — SODIUM CHLORIDE 9 MG/ML
25 INJECTION, SOLUTION INTRAVENOUS CONTINUOUS
Status: DISCONTINUED | OUTPATIENT
Start: 2018-02-20 | End: 2018-02-20

## 2018-02-20 RX ORDER — LIDOCAINE HYDROCHLORIDE 10 MG/ML
10 INJECTION, SOLUTION EPIDURAL; INFILTRATION; INTRACAUDAL; PERINEURAL ONCE
Status: COMPLETED | OUTPATIENT
Start: 2018-02-20 | End: 2018-02-20

## 2018-02-20 RX ORDER — MIDAZOLAM HYDROCHLORIDE 1 MG/ML
5 INJECTION, SOLUTION INTRAMUSCULAR; INTRAVENOUS
Status: DISCONTINUED | OUTPATIENT
Start: 2018-02-20 | End: 2018-02-20

## 2018-02-20 RX ORDER — FENTANYL CITRATE 50 UG/ML
100 INJECTION, SOLUTION INTRAMUSCULAR; INTRAVENOUS
Status: DISCONTINUED | OUTPATIENT
Start: 2018-02-20 | End: 2018-02-20

## 2018-02-20 RX ADMIN — SODIUM CHLORIDE 25 ML/HR: 900 INJECTION, SOLUTION INTRAVENOUS at 08:43

## 2018-02-20 RX ADMIN — FENTANYL CITRATE 50 MCG: 50 INJECTION, SOLUTION INTRAMUSCULAR; INTRAVENOUS at 10:10

## 2018-02-20 RX ADMIN — MIDAZOLAM 1 MG: 1 INJECTION INTRAMUSCULAR; INTRAVENOUS at 10:13

## 2018-02-20 RX ADMIN — FENTANYL CITRATE 50 MCG: 50 INJECTION, SOLUTION INTRAMUSCULAR; INTRAVENOUS at 10:16

## 2018-02-20 RX ADMIN — LIDOCAINE HYDROCHLORIDE 10 ML: 10 INJECTION, SOLUTION EPIDURAL; INFILTRATION; INTRACAUDAL; PERINEURAL at 10:00

## 2018-02-20 RX ADMIN — MIDAZOLAM 1 MG: 1 INJECTION INTRAMUSCULAR; INTRAVENOUS at 10:17

## 2018-02-20 RX ADMIN — MIDAZOLAM 1 MG: 1 INJECTION INTRAMUSCULAR; INTRAVENOUS at 10:15

## 2018-02-20 RX ADMIN — MIDAZOLAM 2 MG: 1 INJECTION INTRAMUSCULAR; INTRAVENOUS at 10:10

## 2018-02-20 NOTE — DISCHARGE INSTRUCTIONS
[address] [facility]  Special Procedures/Radiology Department      Radiologist:  Dr. Elayne Sheridan    Date: 02/20/2018        Bone Marrow Biopsy    You may have an aching pain in the biopsy site tonight. Take Tylenol, as directed on the label, for pain, or take your prescribed pain medication. Avoid ibuprofen and aspirin for the next 48 hours as these drugs may cause you to bruise or bleed. Watch for signs of infection:  Redness, pain, drainage, fever and chills. If this occurs, call your doctor. Resume your previous diet and follow medication reconciliation form. Rest today. Because you received sedation, do not drive or sign any documents until tomorrow morning. It may take up to 3 days for your test results to become available to your physician. Call your physician if you have not heard anything after 3 business days. If you have any questions or concerns, please call [phone] and ask to speak to the nurse on-call.

## 2018-02-20 NOTE — H&P
Radiology History and Physical    Patient: Mera Belle 74 y.o. male       Chief Complaint: No chief complaint on file. History of Present Illness: sedation for bone marrow biopsy    History:  Hospital Problems  Date Reviewed: 1/12/2018    None        Past Medical History:   Diagnosis Date    Anemia 10/13/2017    Arthritis     Chronic obstructive pulmonary disease (HCC)     Chronic pain     back    GERD (gastroesophageal reflux disease)     Hypertension     Hyponatremia 10/13/2017    Ill-defined condition     Gout    Psychiatric disorder     Generalized Anxiety Disorder    Psychiatric disorder     ETOH abuse     Family History   Problem Relation Age of Onset    Cancer Mother      lung    Arthritis-osteo Mother     Cancer Father      throat    Cancer Brother      prostate     Social History     Social History    Marital status:      Spouse name: N/A    Number of children: N/A    Years of education: N/A     Occupational History    Not on file. Social History Main Topics    Smoking status: Former Smoker     Packs/day: 2.50     Years: 29.00     Quit date: 2/17/1993    Smokeless tobacco: Never Used    Alcohol use No      Comment: quit march 15 2017 reports was more than 21 beers  per week     Drug use: No    Sexual activity: Not on file     Other Topics Concern    Not on file     Social History Narrative       Allergies: Allergies   Allergen Reactions    Adhesive Tape-Silicones Other (comments)     Pulls skin off    Sulfa (Sulfonamide Antibiotics) Shortness of Breath       Current Medications:  Current Outpatient Prescriptions   Medication Sig    carvedilol (COREG) 6.25 mg tablet TAKE 1 TABLET BY MOUTH TWICE A DAY WITH MEALS    atorvastatin (LIPITOR) 20 mg tablet TAKE 1 TABLET BY MOUTH DAILY    aspirin (ASPIRIN) 325 mg tablet Take 325 mg by mouth daily.  pravastatin (PRAVACHOL) 10 mg tablet Take 1 Tab by mouth nightly.     furosemide (LASIX) 40 mg tablet Take 1 Tab by mouth daily. May take 1/2 tab daily PRN for increased edema  Indications: Pulmonary Edema due to Chronic Heart Failure    potassium chloride (K-DUR, KLOR-CON) 20 mEq tablet Take 1 Tab by mouth daily.  pantoprazole (PROTONIX) 40 mg tablet Take 1 Tab by mouth Daily (before breakfast).  valsartan (DIOVAN) 160 mg tablet Take 80 mg by mouth two (2) times a day.  ketoconazole (NIZORAL) 2 % shampoo Apply  to affected area daily as needed for Itching. Apply to scalp and face up to three times a week as needed for flaky skin    oxyCODONE-acetaminophen (PERCOCET 10)  mg per tablet Take 1 Tab by mouth three (3) times daily as needed for Pain (Back pain).  loratadine (CLARITIN) 10 mg tablet Take 10 mg by mouth.  diclofenac EC (VOLTAREN) 75 mg EC tablet Take 75 mg by mouth two (2) times a day.  testosterone cypionate (DEPOTESTOTERONE CYPIONATE) 200 mg/mL injection 200 mg by IntraMUSCular route every fourteen (14) days.  allopurinol (ZYLOPRIM) 300 mg tablet Take 300 mg by mouth daily.  omeprazole (PRILOSEC) 20 mg capsule Take 20 mg by mouth daily.  busPIRone (BUSPAR) 15 mg tablet Take 15 mg by mouth three (3) times daily.  citalopram (CELEXA) 40 mg tablet Take 40 mg by mouth daily.  budesonide-formoterol (SYMBICORT) 160-4.5 mcg/actuation HFA inhaler Take 2 Puffs by inhalation two (2) times daily as needed (Shortness of Breath).  tiotropium (SPIRIVA) 18 mcg inhalation capsule Take 1 Cap by inhalation daily.  albuterol (PROVENTIL HFA, VENTOLIN HFA, PROAIR HFA) 90 mcg/actuation inhaler Take 2 Puffs by inhalation daily as needed for Wheezing or Shortness of Breath.  tadalafil (CIALIS) 20 mg tablet Take 20 mg by mouth as needed.      Current Facility-Administered Medications   Medication Dose Route Frequency    midazolam (VERSED) injection 5 mg  5 mg IntraVENous Multiple    0.9% sodium chloride infusion  25 mL/hr IntraVENous CONTINUOUS    fentaNYL citrate (PF) injection 100 mcg  100 mcg IntraVENous Multiple        Physical Exam:  Blood pressure 113/64, pulse 78, temperature 98 °F (36.7 °C), resp. rate 20, height 5' 7.5\" (1.715 m), weight 83.9 kg (185 lb), SpO2 100 %. LUNG: clear to auscultation bilaterally, wheezes R base, HEART: regular rate and rhythm        Laboratory:      Recent Labs      02/20/18   0845   HGB  11.7*   HCT  34.5*   WBC  5.2   PLT  299         Plan of Care/Planned Procedure:  Risks, benefits, and alternatives reviewed with patient and he agrees to proceed with the procedure.        Santi Ybarra MD

## 2018-02-20 NOTE — ROUTINE PROCESS
Discharge instructions given and reviewed with pt and pt voices complete understanding of all instructions given. Pt taken out via wheelchair and discharged to home with brother in car.

## 2018-02-20 NOTE — IP AVS SNAPSHOT
[address] [facility] 
[phone] Patient: Jefferson Call MRN: [id number] :1950 About your hospitalization You were admitted on:  2018 You last received care in the:  MRM RAD CT You were discharged on:  2018 Why you were hospitalized Your primary diagnosis was:  Not on File Follow-up Information None Your Scheduled Appointments 2018  9:30 AM EST  
CT BX BNE MARRW NDL[facility] [place] [place] [place] ([address]) [facility]  
[phone] DIET RESTRICTIONS 1. For invasive and sedation procedures, patient should be NPO 8 hours prior to exam, unless instructed otherwise. 2. Take all medications not requiring food with sip of water, excluding blood thinners and diabetic medications. GENERAL INSTRUCTIONS 1. Bring a list of all medications you are currently taking, including over the counter medications. 2. Blood thinners and platelet inhibitors must be stopped 3-5 days prior to procedure. Consult your ordering physician prior to stopping them. 3. Check in at registration 1 hour before your appointment time unless you were instructed to do otherwise. 4. If sedation is required, you must have a  present before procedure is started. 5. The procedure may last 1-1 ½ hours. You may be required to stay 4-6 hours after the procedure for observation. 6. Bring any [facility] films/images pertaining to the area of interest with you on the day of appointment. Patient should report to outpatient registration ([facility]) 30 minutes prior to the appointment time unless instructed otherwise. Discharge Orders None A check gabino indicates which time of day the medication should be taken. My Medications ASK your doctor about these medications Instructions Each Dose to Equal  
 Morning Noon Evening Bedtime  
 albuterol 90 mcg/actuation inhaler Commonly known as:  PROVENTIL HFA, VENTOLIN HFA, PROAIR HFA Your last dose was: Your next dose is: Take 2 Puffs by inhalation daily as needed for Wheezing or Shortness of Breath. 2 Puff  
    
   
   
   
  
 allopurinol 300 mg tablet Commonly known as:  Wilma Chu Your last dose was: Your next dose is: Take 300 mg by mouth daily. 300 mg  
    
   
   
   
  
 aspirin 325 mg tablet Commonly known as:  ASPIRIN Your last dose was: Your next dose is: Take 325 mg by mouth daily. 325 mg  
    
   
   
   
  
 atorvastatin 20 mg tablet Commonly known as:  LIPITOR Your last dose was: Your next dose is: TAKE 1 TABLET BY MOUTH DAILY  
     
   
   
   
  
 budesonide-formoterol 160-4.5 mcg/actuation Hfaa Commonly known as:  SYMBICORT Your last dose was: Your next dose is: Take 2 Puffs by inhalation two (2) times daily as needed (Shortness of Breath). 2 Puff  
    
   
   
   
  
 busPIRone 15 mg tablet Commonly known as:  BUSPAR Your last dose was: Your next dose is: Take 15 mg by mouth three (3) times daily. 15 mg  
    
   
   
   
  
 carvedilol 6.25 mg tablet Commonly known as:  Clerance Barley Your last dose was: Your next dose is: TAKE 1 TABLET BY MOUTH TWICE A DAY WITH MEALS CIALIS 20 mg tablet Generic drug:  tadalafil Your last dose was: Your next dose is: Take 20 mg by mouth as needed. 20 mg  
    
   
   
   
  
 citalopram 40 mg tablet Commonly known as:  Tran Ordonez Your last dose was: Your next dose is: Take 40 mg by mouth daily.   
 40 mg  
    
   
   
   
  
 CLARITIN 10 mg tablet Generic drug:  loratadine Your last dose was: Your next dose is: Take 10 mg by mouth. 10 mg  
    
   
   
   
  
 diclofenac EC 75 mg EC tablet Commonly known as:  VOLTAREN Your last dose was: Your next dose is: Take 75 mg by mouth two (2) times a day. 75 mg  
    
   
   
   
  
 furosemide 40 mg tablet Commonly known as:  LASIX Your last dose was: Your next dose is: Take 1 Tab by mouth daily. May take 1/2 tab daily PRN for increased edema  Indications: Pulmonary Edema due to Chronic Heart Failure 40 mg  
    
   
   
   
  
 ketoconazole 2 % shampoo Commonly known as:  NIZORAL Your last dose was: Your next dose is:    
   
   
 Apply  to affected area daily as needed for Itching. Apply to scalp and face up to three times a week as needed for flaky skin  
     
   
   
   
  
 omeprazole 20 mg capsule Commonly known as:  PRILOSEC Your last dose was: Your next dose is: Take 20 mg by mouth daily. 20 mg  
    
   
   
   
  
 oxyCODONE-acetaminophen  mg per tablet Commonly known as:  PERCOCET 10 Your last dose was: Your next dose is: Take 1 Tab by mouth three (3) times daily as needed for Pain (Back pain). 1 Tab  
    
   
   
   
  
 pantoprazole 40 mg tablet Commonly known as:  PROTONIX Your last dose was: Your next dose is: Take 1 Tab by mouth Daily (before breakfast). 40 mg  
    
   
   
   
  
 potassium chloride 20 mEq tablet Commonly known as:  K-DUR, KLOR-CON Your last dose was: Your next dose is: Take 1 Tab by mouth daily. 20 mEq  
    
   
   
   
  
 pravastatin 10 mg tablet Commonly known as:  PRAVACHOL Your last dose was: Your next dose is: Take 1 Tab by mouth nightly.   
 10 mg  
    
   
   
   
  
 testosterone cypionate 200 mg/mL injection Commonly known as:  DEPOTESTOTERONE CYPIONATE Your last dose was: Your next dose is:    
   
   
 200 mg by IntraMUSCular route every fourteen (14) days. 200 mg  
    
   
   
   
  
 tiotropium 18 mcg inhalation capsule Commonly known as:  Miguelina Partida Your last dose was: Your next dose is: Take 1 Cap by inhalation daily. 1 Cap  
    
   
   
   
  
 valsartan 160 mg tablet Commonly known as:  DIOVAN Your last dose was: Your next dose is: Take 80 mg by mouth two (2) times a day. 80 mg Discharge Instructions Janet Cage [facility] Special Procedures/Radiology Department Radiologist:  Dr. Alfredo Aquino Date: 02/20/2018 Bone Marrow Biopsy You may have an aching pain in the biopsy site tonight. Take Tylenol, as directed on the label, for pain, or take your prescribed pain medication. Avoid ibuprofen and aspirin for the next 48 hours as these drugs may cause you to bruise or bleed. Watch for signs of infection:  Redness, pain, drainage, fever and chills. If this occurs, call your doctor. Resume your previous diet and follow medication reconciliation form. Rest today. Because you received sedation, do not drive or sign any documents until tomorrow morning. It may take up to 3 days for your test results to become available to your physician. Call your physician if you have not heard anything after 3 business days. If you have any questions or concerns, please call [phone] and ask to speak to the nurse on-call. Introducing \[facility] [place] [facility]\"" & HEALTH SERVICES! Janet Cage introduces [employer] patient portal. Now you can access parts of your medical record, email your doctor's office, and request medication refills online. 1. In your internet browser, go to [email or web address] 2. Click on the First Time User? Click Here link in the Sign In box. You will see the New Member Sign Up page. 3. Enter your [employer] Access Code exactly as it appears below. You will not need to use this code after youve completed the sign-up process. If you do not sign up before the expiration date, you must request a new code. · [employer] Access Code: [email or web address] Expires: 4/12/2018  9:53 AM 
 
4. Enter the last four digits of your Social Security Number (xxxx) and Date of Birth (mm/dd/yyyy) as indicated and click Submit. You will be taken to the next sign-up page. 5. Create a [employer] ID. This will be your [employer] login ID and cannot be changed, so think of one that is secure and easy to remember. 6. Create a [employer] password. You can change your password at any time. 7. Enter your Password Reset Question and Answer. This can be used at a later time if you forget your password. 8. Enter your e-mail address. You will receive e-mail notification when new information is available in [address] 9. Click Sign Up. You can now view and download portions of your medical record. 10. Click the Download Summary menu link to download a portable copy of your medical information. If you have questions, please visit the Frequently Asked Questions section of the [employer] website. Remember, [employer] is NOT to be used for urgent needs. For medical emergencies, dial 911. Now available from your iPhone and Android! Unresulted Labs-Please follow up with your PCP about these lab tests Order Current Status CBC WITH AUTOMATED DIFF In process Providers Seen During Your Hospitalization Provider Specialty Primary office phone Brenda Almeida MD Hematology and Oncology [phone] Your Primary Care Physician (PCP) Primary Care Physician Office Phone Office Fax Donna Farmer [phone] You are allergic to the following Allergen Reactions Adhesive Tape-Silicones Other (comments) Pulls skin off  
    
 Sulfa (Sulfonamide Antibiotics) Shortness of Breath Recent Documentation Height Weight BMI Smoking Status 1.715 m 83.9 kg 28.55 kg/m2 Former Smoker Emergency Contacts Name Discharge Info Relation Home Work Mobile Peg Brooks DISCHARGE CAREGIVER [3] Spouse [3]   [phone] Patient Belongings The following personal items are in your possession at time of discharge: 
     Visual Aid: Glasses, With patient Please provide this summary of care documentation to your next provider. Signatures-by signing, you are acknowledging that this After Visit Summary has been reviewed with you and you have received a copy. Patient Signature:  ____________________________________________________________ Date:  ____________________________________________________________  
  
[facility] Provider Signature:  ____________________________________________________________ Date:  ____________________________________________________________

## 2018-02-20 NOTE — IP AVS SNAPSHOT
[address] [facility] 
[phone] Patient: Aleksander Stearns MRN: [id number] :1950 A check gabino indicates which time of day the medication should be taken. My Medications ASK your doctor about these medications Instructions Each Dose to Equal  
 Morning Noon Evening Bedtime  
 albuterol 90 mcg/actuation inhaler Commonly known as:  PROVENTIL HFA, VENTOLIN HFA, PROAIR HFA Your last dose was: Your next dose is: Take 2 Puffs by inhalation daily as needed for Wheezing or Shortness of Breath. 2 Puff  
    
   
   
   
  
 allopurinol 300 mg tablet Commonly known as:  Carmine Ringer Your last dose was: Your next dose is: Take 300 mg by mouth daily. 300 mg  
    
   
   
   
  
 aspirin 325 mg tablet Commonly known as:  ASPIRIN Your last dose was: Your next dose is: Take 325 mg by mouth daily. 325 mg  
    
   
   
   
  
 atorvastatin 20 mg tablet Commonly known as:  LIPITOR Your last dose was: Your next dose is: TAKE 1 TABLET BY MOUTH DAILY  
     
   
   
   
  
 budesonide-formoterol 160-4.5 mcg/actuation Hfaa Commonly known as:  SYMBICORT Your last dose was: Your next dose is: Take 2 Puffs by inhalation two (2) times daily as needed (Shortness of Breath). 2 Puff  
    
   
   
   
  
 busPIRone 15 mg tablet Commonly known as:  BUSPAR Your last dose was: Your next dose is: Take 15 mg by mouth three (3) times daily. 15 mg  
    
   
   
   
  
 carvedilol 6.25 mg tablet Commonly known as:  Brittaney Jude Your last dose was: Your next dose is: TAKE 1 TABLET BY MOUTH TWICE A DAY WITH MEALS CIALIS 20 mg tablet Generic drug:  tadalafil Your last dose was: Your next dose is: Take 20 mg by mouth as needed. 20 mg  
    
   
   
   
  
 citalopram 40 mg tablet Commonly known as:  Millicent Crystal Your last dose was: Your next dose is: Take 40 mg by mouth daily. 40 mg CLARITIN 10 mg tablet Generic drug:  loratadine Your last dose was: Your next dose is: Take 10 mg by mouth. 10 mg  
    
   
   
   
  
 diclofenac EC 75 mg EC tablet Commonly known as:  VOLTAREN Your last dose was: Your next dose is: Take 75 mg by mouth two (2) times a day. 75 mg  
    
   
   
   
  
 furosemide 40 mg tablet Commonly known as:  LASIX Your last dose was: Your next dose is: Take 1 Tab by mouth daily. May take 1/2 tab daily PRN for increased edema  Indications: Pulmonary Edema due to Chronic Heart Failure 40 mg  
    
   
   
   
  
 ketoconazole 2 % shampoo Commonly known as:  NIZORAL Your last dose was: Your next dose is:    
   
   
 Apply  to affected area daily as needed for Itching. Apply to scalp and face up to three times a week as needed for flaky skin  
     
   
   
   
  
 omeprazole 20 mg capsule Commonly known as:  PRILOSEC Your last dose was: Your next dose is: Take 20 mg by mouth daily. 20 mg  
    
   
   
   
  
 oxyCODONE-acetaminophen  mg per tablet Commonly known as:  PERCOCET 10 Your last dose was: Your next dose is: Take 1 Tab by mouth three (3) times daily as needed for Pain (Back pain). 1 Tab  
    
   
   
   
  
 pantoprazole 40 mg tablet Commonly known as:  PROTONIX Your last dose was: Your next dose is: Take 1 Tab by mouth Daily (before breakfast). 40 mg  
    
   
   
   
  
 potassium chloride 20 mEq tablet Commonly known as:  K-DUR, KLOR-CON Your last dose was: Your next dose is: Take 1 Tab by mouth daily. 20 mEq  
    
   
   
   
  
 pravastatin 10 mg tablet Commonly known as:  PRAVACHOL Your last dose was: Your next dose is: Take 1 Tab by mouth nightly. 10 mg  
    
   
   
   
  
 testosterone cypionate 200 mg/mL injection Commonly known as:  DEPOTESTOTERONE CYPIONATE Your last dose was: Your next dose is:    
   
   
 200 mg by IntraMUSCular route every fourteen (14) days. 200 mg  
    
   
   
   
  
 tiotropium 18 mcg inhalation capsule Commonly known as:  Melia Moura Your last dose was: Your next dose is: Take 1 Cap by inhalation daily. 1 Cap  
    
   
   
   
  
 valsartan 160 mg tablet Commonly known as:  DIOVAN Your last dose was: Your next dose is: Take 80 mg by mouth two (2) times a day.   
 80 mg

## 2018-02-21 PROCEDURE — 77030028872 HC BN BIOP NDL ON CNTRL TY TELE -C

## 2018-03-09 ENCOUNTER — HOSPITAL ENCOUNTER (OUTPATIENT)
Dept: PET IMAGING | Age: 68
Discharge: HOME | End: 2018-03-09
Attending: INTERNAL MEDICINE
Payer: COMMERCIAL

## 2018-03-09 VITALS — BODY MASS INDEX: 28.88 KG/M2 | HEIGHT: 67 IN | WEIGHT: 184 LBS

## 2018-03-09 DIAGNOSIS — C90.00 MULTIPLE MYELOMA WITH FAILED REMISSION (HCC): ICD-10-CM

## 2018-03-09 DIAGNOSIS — D47.2 MONOCLONAL GAMMOPATHY: ICD-10-CM

## 2018-03-09 PROCEDURE — A9552 F18 FDG: HCPCS

## 2018-03-09 RX ORDER — SODIUM CHLORIDE 0.9 % (FLUSH) 0.9 %
10 SYRINGE (ML) INJECTION
Status: COMPLETED | OUTPATIENT
Start: 2018-03-09 | End: 2018-03-09

## 2018-03-09 RX ADMIN — Medication 10 ML: at 09:32

## 2018-05-09 RX ORDER — POTASSIUM CHLORIDE 1500 MG/1
TABLET, FILM COATED, EXTENDED RELEASE ORAL
Qty: 90 TAB | Refills: 1 | Status: SHIPPED | OUTPATIENT
Start: 2018-05-09 | End: 2019-01-16 | Stop reason: SDUPTHER

## 2018-05-21 RX ORDER — FUROSEMIDE 40 MG/1
TABLET ORAL
Qty: 90 TAB | Refills: 1 | Status: SHIPPED | OUTPATIENT
Start: 2018-05-21 | End: 2018-11-19 | Stop reason: SDUPTHER

## 2018-07-05 ENCOUNTER — OFFICE VISIT (OUTPATIENT)
Dept: CARDIOLOGY CLINIC | Age: 68
End: 2018-07-05

## 2018-07-05 VITALS
OXYGEN SATURATION: 97 % | BODY MASS INDEX: 31.08 KG/M2 | HEART RATE: 71 BPM | RESPIRATION RATE: 20 BRPM | SYSTOLIC BLOOD PRESSURE: 108 MMHG | HEIGHT: 67 IN | WEIGHT: 198 LBS | DIASTOLIC BLOOD PRESSURE: 68 MMHG

## 2018-07-05 DIAGNOSIS — I42.8 NICM (NONISCHEMIC CARDIOMYOPATHY) (HCC): Primary | ICD-10-CM

## 2018-07-05 DIAGNOSIS — E78.2 MIXED HYPERLIPIDEMIA: ICD-10-CM

## 2018-07-05 DIAGNOSIS — I10 ACCELERATED HYPERTENSION: ICD-10-CM

## 2018-07-05 DIAGNOSIS — J44.9 CHRONIC OBSTRUCTIVE PULMONARY DISEASE, UNSPECIFIED COPD TYPE (HCC): ICD-10-CM

## 2018-07-05 NOTE — PROGRESS NOTES
1. Have you been to the ER, urgent care clinic since your last visit? Hospitalized since your last visit? NO    2. Have you seen or consulted any other health care providers outside of the [address] [place] [address] since your last visit? Include any pap smears or colon screening. YES, PCP.     3 MONTH FOLLOW UP. NO CARDIAC C/O.

## 2018-07-05 NOTE — MR AVS SNAPSHOT
[address] [facility] 
[phone] Patient: Bev Shelton MRN: [id number] :1950 Visit Information Date & Time Provider Department Dept. Phone Encounter #  
 2018  9:00 AM Angela Luke, [facility] [phone] [id number] Your Appointments 2019  3:45 PM  
ESTABLISHED PATIENT with Angela Luke MD  
[facility] [address] [place] [address]) [address] [facility]  
[phone] [id number] [facility] Upcoming Health Maintenance Date Due Hepatitis C Screening 1950 DTaP/Tdap/Td series (1 - Tdap) 1973 FOBT Q 1 YEAR AGE 50-75 2002 ZOSTER VACCINE AGE 60> 10/16/2012 GLAUCOMA SCREENING Q2Y 2017 Pneumococcal 65+ High/Highest Risk (1 of 2 - PCV13) 2017 MEDICARE YEARLY EXAM 3/28/2018 Influenza Age 5 to Adult 2018 Allergies as of 2018  Review Complete On: 2018 By: Angela Luke MD  
  
 Severity Noted Reaction Type Reactions Adhesive Tape-silicones      Other (comments) Pulls skin off  
 Sulfa (Sulfonamide Antibiotics)  2016    Shortness of Breath Current Immunizations  Never Reviewed Name Date Influenza Vaccine (Quad) PF 10/12/2017  8:01 AM  
  
 Not reviewed this visit You Were Diagnosed With   
  
 Codes Comments NICM [facility]ischemic cardiomyopathy) [facility]    -  Primary ICD-10-CM: I42.8 ICD-9-CM: 425.4 Accelerated hypertension     ICD-10-CM: I10 
ICD-9-CM: 401.0 Chronic obstructive pulmonary disease, unspecified COPD type ([facility]     ICD-10-CM: J44.9 ICD-9-CM: 333 Mixed hyperlipidemia     ICD-10-CM: E78.2 ICD-9-CM: 272.2 Vitals BP Pulse Resp Height(growth percentile) Weight(growth percentile) SpO2 108/68 (BP 1 Location: Right arm, BP Patient Position: Sitting) 71 20 5' 7\" (1.702 m) 198 lb (89.8 kg) 97% BMI Smoking Status 31.01 kg/m2 Former Smoker Vitals History BMI and BSA Data Body Mass Index Body Surface Area 31.01 kg/m 2 2.06 m 2 Preferred Pharmacy Pharmacy Name Phone [id number] [facility] [phone] Your Updated Medication List  
  
   
This list is accurate as of 7/5/18  9:39 AM.  Always use your most recent med list.  
  
  
  
  
 albuterol 90 mcg/actuation inhaler Commonly known as:  PROVENTIL HFA, VENTOLIN HFA, PROAIR HFA Take 2 Puffs by inhalation daily as needed for Wheezing or Shortness of Breath. allopurinol 300 mg tablet Commonly known as:  Trina Cabrera Take 300 mg by mouth daily. aspirin 325 mg tablet Commonly known as:  ASPIRIN Take 81 mg by mouth daily. budesonide-formoterol 160-4.5 mcg/actuation Hfaa Commonly known as:  SYMBICORT Take 2 Puffs by inhalation two (2) times daily as needed (Shortness of Breath). busPIRone 15 mg tablet Commonly known as:  BUSPAR Take 15 mg by mouth three (3) times daily. carvedilol 6.25 mg tablet Commonly known as:  COREG  
TAKE 1 TABLET BY MOUTH TWICE A DAY WITH MEALS CIALIS 20 mg tablet Generic drug:  tadalafil Take 20 mg by mouth as needed. citalopram 40 mg tablet Commonly known as:  Lovetta Rape Take 40 mg by mouth daily. CLARITIN 10 mg tablet Generic drug:  loratadine Take 10 mg by mouth. diclofenac EC 75 mg EC tablet Commonly known as:  VOLTAREN Take 75 mg by mouth two (2) times a day. furosemide 40 mg tablet Commonly known as:  LASIX TAKE 1 TABLET BY MOUTH DAILY. TAKE 1/2 TABLET DAILY AS NEEDED FOR INCREASE EDEMA  
  
 ketoconazole 2 % shampoo Commonly known as:  NIZORAL  
 Apply  to affected area daily as needed for Itching. Apply to scalp and face up to three times a week as needed for flaky skin  
  
 omeprazole 20 mg capsule Commonly known as:  PRILOSEC Take 20 mg by mouth daily. oxyCODONE-acetaminophen  mg per tablet Commonly known as:  PERCOCET 10 Take 1 Tab by mouth three (3) times daily as needed for Pain (Back pain). potassium chloride SR 20 mEq tablet Commonly known as:  K-TAB  
TAKE 1 TABLET BY MOUTH DAILY pravastatin 10 mg tablet Commonly known as:  PRAVACHOL Take 1 Tab by mouth nightly. testosterone cypionate 200 mg/mL injection Commonly known as:  DEPOTESTOTERONE CYPIONATE  
200 mg by IntraMUSCular route every fourteen (14) days. tiotropium 18 mcg inhalation capsule Commonly known as:  Poonam Meeter Take 1 Cap by inhalation daily. valsartan 160 mg tablet Commonly known as:  DIOVAN Take 80 mg by mouth two (2) times a day. We Performed the Following AMB POC EKG ROUTINE W/ 12 LEADS, INTER & REP [93736 CPT(R)] Introducing [facility] SERVICES! Aj Moya introduces [employer] patient portal. Now you can access parts of your medical record, email your doctor's office, and request medication refills online. 1. In your internet browser, go to [email or web address] 2. Click on the First Time User? Click Here link in the Sign In box. You will see the New Member Sign Up page. 3. Enter your [employer] Access Code exactly as it appears below. You will not need to use this code after youve completed the sign-up process. If you do not sign up before the expiration date, you must request a new code. · [employer] Access Code: [email or web address] Expires: 7/21/2018  5:28 AM 
 
4. Enter the last four digits of your Social Security Number (xxxx) and Date of Birth (mm/dd/yyyy) as indicated and click Submit. You will be taken to the next sign-up page. 5. Create a [email or web address] ID. This will be your [email or web address] login ID and cannot be changed, so think of one that is secure and easy to remember. 6. Create a [email or web address] password. You can change your password at any time. 7. Enter your Password Reset Question and Answer. This can be used at a later time if you forget your password. 8. Enter your e-mail address. You will receive e-mail notification when new information is available in [address] 9. Click Sign Up. You can now view and download portions of your medical record. 10. Click the Download Summary menu link to download a portable copy of your medical information. If you have questions, please visit the Frequently Asked Questions section of the [email or web address] website. Remember, [email or web address] is NOT to be used for urgent needs. For medical emergencies, dial 911. Now available from your iPhone and Android! Please provide this summary of care documentation to your next provider. Your primary care clinician is listed as Eleanor Mchugh. If you have any questions after today's visit, please call [phone].

## 2018-07-05 NOTE — PROGRESS NOTES
[address] [id number]     Subjective:      Alex Rios is a 74 y.o. male is here for routine f/u. The patient denies chest pain, orthopnea, PND, LE edema, palpitations, syncope, or presyncope. Reports much improved SOB with moderate exertion. Underwent stem cell therapy for COPD.     Patient Active Problem List    Diagnosis Date Noted    Hyponatremia 10/13/2017    Anemia 10/13/2017    PAT (paroxysmal atrial tachycardia) (HCC) 10/12/2017    Pulmonary edema 10/11/2017    Accelerated hypertension 10/11/2017    Elevated troponin 10/11/2017    Acute respiratory failure with hypoxia ([facility] 10/11/2017    COPD (chronic obstructive pulmonary disease) ([facility] 10/11/2017    NICM (nonischemic cardiomyopathy) ([facility] 73/63/6137    Acute systolic congestive heart failure, NYHA class 4 ([facility] 10/11/2017    Hip arthritis 09/21/2017    Degenerative joint disease of right hip 02/29/2016      Muna Miller MD  Past Medical History:   Diagnosis Date    Anemia 10/13/2017    Arthritis     Chronic obstructive pulmonary disease (HCC)     Chronic pain     back    GERD (gastroesophageal reflux disease)     Hypertension     Hyponatremia 10/13/2017    Ill-defined condition     Gout    Psychiatric disorder     Generalized Anxiety Disorder    Psychiatric disorder     ETOH abuse      Past Surgical History:   Procedure Laterality Date    HX COLECTOMY  2000    diverticulitis    HX OTHER SURGICAL Left     mastoidectomy and inner ear surgery- age  18   [facility] OTHER SURGICAL      [facility]    HX TONSILLECTOMY      KNEE SCOPE,MED OR LAT MENIS REPAIR Left      Allergies   Allergen Reactions    Adhesive Tape-Silicones Other (comments)     Pulls skin off    Sulfa (Sulfonamide Antibiotics) Shortness of Breath      Family History   Problem Relation Age of Onset    Cancer Mother      lung    Arthritis-osteo Mother    [address] [place] [address] Cancer Father      throat    Cancer Brother      prostate      Social History     Social History    Marital status:      Spouse name: N/A    Number of children: N/A    Years of education: N/A     Occupational History    Not on file. Social History Main Topics    Smoking status: Former Smoker     Packs/day: 2.50     Years: 29.00     Quit date: 2/17/1993    Smokeless tobacco: Never Used    Alcohol use No      Comment: quit march 15 2017 reports was more than 21 beers  per week     Drug use: No    Sexual activity: Not on file     Other Topics Concern    Not on file     Social History Narrative      Current Outpatient Prescriptions   Medication Sig    furosemide (LASIX) 40 mg tablet TAKE 1 TABLET BY MOUTH DAILY. TAKE 1/2 TABLET DAILY AS NEEDED FOR INCREASE EDEMA    potassium chloride SR (K-TAB) 20 mEq tablet TAKE 1 TABLET BY MOUTH DAILY    carvedilol (COREG) 6.25 mg tablet TAKE 1 TABLET BY MOUTH TWICE A DAY WITH MEALS    pravastatin (PRAVACHOL) 10 mg tablet Take 1 Tab by mouth nightly.  valsartan (DIOVAN) 160 mg tablet Take 80 mg by mouth two (2) times a day.  ketoconazole (NIZORAL) 2 % shampoo Apply  to affected area daily as needed for Itching. Apply to scalp and face up to three times a week as needed for flaky skin    oxyCODONE-acetaminophen (PERCOCET 10)  mg per tablet Take 1 Tab by mouth three (3) times daily as needed for Pain (Back pain).  loratadine (CLARITIN) 10 mg tablet Take 10 mg by mouth.  diclofenac EC (VOLTAREN) 75 mg EC tablet Take 75 mg by mouth two (2) times a day.  allopurinol (ZYLOPRIM) 300 mg tablet Take 300 mg by mouth daily.  omeprazole (PRILOSEC) 20 mg capsule Take 20 mg by mouth daily.  busPIRone (BUSPAR) 15 mg tablet Take 15 mg by mouth three (3) times daily.  citalopram (CELEXA) 40 mg tablet Take 40 mg by mouth daily.     budesonide-formoterol (SYMBICORT) 160-4.5 mcg/actuation HFA inhaler Take 2 Puffs by inhalation two (2) times daily as needed (Shortness of Breath).  tiotropium (SPIRIVA) 18 mcg inhalation capsule Take 1 Cap by inhalation daily.  albuterol (PROVENTIL HFA, VENTOLIN HFA, PROAIR HFA) 90 mcg/actuation inhaler Take 2 Puffs by inhalation daily as needed for Wheezing or Shortness of Breath.  aspirin (ASPIRIN) 325 mg tablet Take 81 mg by mouth daily.  tadalafil (CIALIS) 20 mg tablet Take 20 mg by mouth as needed.  testosterone cypionate (DEPOTESTOTERONE CYPIONATE) 200 mg/mL injection 200 mg by IntraMUSCular route every fourteen (14) days. No current facility-administered medications for this visit. Review of Symptoms:  11 systems reviewed, negative other than as stated in the HPI    Physical ExamPhysical Exam:    Vitals:    07/05/18 0900 07/05/18 0907   BP: 100/50 108/68   Pulse: 71    Resp: 20    SpO2: 97%    Weight: 198 lb (89.8 kg)    Height: 5' 7\" (1.702 m)      Body mass index is 31.01 kg/(m^2). General PE   Gen:  NAD  Mental Status - Alert. General Appearance - Not in acute distress. Chest and Lung Exam   Inspection: Accessory muscles - No use of accessory muscles in breathing. Auscultation:   Breath sounds: - Normal.   Cardiovascular   Inspection: Jugular vein - Bilateral - Inspection Normal.   Palpation/Percussion:   Apical Impulse: - Normal.   Auscultation: Rhythm - Regular. Heart Sounds - S1 WNL and S2 WNL. No S3 or S4. Murmurs & Other Heart Sounds: Auscultation of the heart reveals - No Murmurs. Peripheral Vascular   Upper Extremity: Inspection - Bilateral - No Cyanotic nailbeds or Digital clubbing. Lower Extremity:   Palpation: Edema - Bilateral - No edema. Abdomen:   Soft, non-tender, bowel sounds are active.   Neuro: A&O times 3, CN and motor grossly WNL    Labs:   Lab Results   Component Value Date/Time    Cholesterol, total 133 10/12/2017 03:04 AM    HDL Cholesterol 57 10/12/2017 03:04 AM    LDL, calculated 64.2 10/12/2017 03:04 AM    Triglyceride 59 10/12/2017 03:04 AM    CHOL/HDL Ratio 2.3 10/12/2017 03:04 AM     Lab Results   Component Value Date/Time    CK 83 10/11/2017 11:57 AM     Lab Results   Component Value Date/Time    Sodium 129 (L) 10/13/2017 03:25 AM    Potassium 3.3 (L) 10/13/2017 03:25 AM    Chloride 92 (L) 10/13/2017 03:25 AM    CO2 29 10/13/2017 03:25 AM    Anion gap 8 10/13/2017 03:25 AM    Glucose 112 (H) 10/13/2017 03:25 AM    BUN 19 10/13/2017 03:25 AM    Creatinine 0.61 (L) 10/13/2017 03:25 AM    BUN/Creatinine ratio 31 (H) 10/13/2017 03:25 AM    GFR est AA >60 10/13/2017 03:25 AM    GFR est non-AA >60 10/13/2017 03:25 AM    Calcium 8.5 10/13/2017 03:25 AM    Bilirubin, total 0.3 10/11/2017 07:43 AM    AST (SGOT) 18 10/11/2017 07:43 AM    Alk. phosphatase 115 10/11/2017 07:43 AM    Protein, total 7.3 10/11/2017 07:43 AM    Albumin 3.2 (L) 10/11/2017 07:43 AM    Globulin 4.1 (H) 10/11/2017 07:43 AM    A-G Ratio 0.8 (L) 10/11/2017 07:43 AM    ALT (SGPT) 25 10/11/2017 07:43 AM       EKG:  NSR     Assessment:      1. NICM (nonischemic cardiomyopathy) ([facility]    2. Accelerated hypertension    3. Chronic obstructive pulmonary disease, unspecified COPD type ([facility]    4. Mixed hyperlipidemia        Orders Placed This Encounter    AMB POC EKG ROUTINE W/ 12 LEADS, INTER & REP     Order Specific Question:   Reason for Exam:     Answer:   anetaitne        Plan:     Pt presents for f/u    HFrEF, NICM  Improved systolic function, 08-42% per limited echo in 01/18   Hx EF 15-30%, mod-severe MR, G 2 diastolic dysfunction per echo in 10/17  Compensated, [place] heart class I. Continue current dosing of Diovan, diuretic and carvedilol. Hypertension: Controlled continue current medications. Mild, nonobstructive CAD per cardiac cath 10/17    HLD  10/17 LDL at 64. On statin. Will see Dr Precious Ayala and will wants him to order labwork.     COPD  On inhaler  Underwent stem cell therapy with [facility] in [place] and has greatly improved    Counseled on diet and exercise- eventual goal of 30-60 minutes 5-7 times a week as per AHA guidelines. Continue current care and f/u in 6 months.     Justus Lozano MD

## 2018-07-30 ENCOUNTER — TELEPHONE (OUTPATIENT)
Dept: CARDIOLOGY CLINIC | Age: 68
End: 2018-07-30

## 2018-07-31 RX ORDER — LOSARTAN POTASSIUM 25 MG/1
25 TABLET ORAL 2 TIMES DAILY
COMMUNITY
End: 2018-07-31 | Stop reason: SDUPTHER

## 2018-07-31 RX ORDER — LOSARTAN POTASSIUM 25 MG/1
25 TABLET ORAL 2 TIMES DAILY
Qty: 180 TAB | Refills: 3 | Status: SHIPPED | OUTPATIENT
Start: 2018-07-31 | End: 2019-08-11 | Stop reason: SDUPTHER

## 2018-08-15 ENCOUNTER — TELEPHONE (OUTPATIENT)
Dept: CARDIOLOGY CLINIC | Age: 68
End: 2018-08-15

## 2018-11-19 RX ORDER — FUROSEMIDE 40 MG/1
TABLET ORAL
Qty: 90 TAB | Refills: 1 | Status: SHIPPED | OUTPATIENT
Start: 2018-11-19 | End: 2019-05-13 | Stop reason: SDUPTHER

## 2018-11-19 RX ORDER — POTASSIUM CHLORIDE 750 MG/1
TABLET, FILM COATED, EXTENDED RELEASE ORAL
Qty: 180 TAB | Refills: 0 | Status: SHIPPED | OUTPATIENT
Start: 2018-11-19 | End: 2019-02-12 | Stop reason: SDUPTHER

## 2018-11-23 RX ORDER — PRAVASTATIN SODIUM 10 MG/1
TABLET ORAL
Qty: 90 TAB | Refills: 3 | Status: SHIPPED | OUTPATIENT
Start: 2018-11-23

## 2019-01-16 ENCOUNTER — OFFICE VISIT (OUTPATIENT)
Dept: CARDIOLOGY CLINIC | Age: 69
End: 2019-01-16

## 2019-01-16 VITALS
SYSTOLIC BLOOD PRESSURE: 134 MMHG | DIASTOLIC BLOOD PRESSURE: 80 MMHG | OXYGEN SATURATION: 97 % | HEIGHT: 67 IN | HEART RATE: 89 BPM | RESPIRATION RATE: 16 BRPM | WEIGHT: 201.3 LBS | BODY MASS INDEX: 31.6 KG/M2

## 2019-01-16 DIAGNOSIS — I42.8 NICM (NONISCHEMIC CARDIOMYOPATHY) (HCC): Primary | ICD-10-CM

## 2019-01-16 DIAGNOSIS — I10 ACCELERATED HYPERTENSION: ICD-10-CM

## 2019-01-16 NOTE — PROGRESS NOTES
1. Have you been to the ER, urgent care clinic since your last visit? Hospitalized since your last visit? NO 
 
2. Have you seen or consulted any other health care providers outside of the [address] [place] [address] since your last visit? Include any pap smears or colon screening. YES, [facility].    
 
 
NO CARDIAC C/O

## 2019-01-16 NOTE — PROGRESS NOTES
Subjective/HPI:  
 
Mr. Martin James is a 79 y.o. male is here for routine f/u. He has a PMHx of NICM now resolved; COPD s/p stem cell therapy; GERD, anemia, GERD and hx of EtOH abuse. He continues to do well. 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 He has been sober from alcohol for 2 years now. He denies complaints of chest pains, orthopnea, PND or edema. He denies shortness of breath or dizziness or palpitations. PCP Provider Debbie Recinos MD 
Past Medical History:  
Diagnosis Date  Anemia 10/13/2017  Arthritis  Chronic obstructive pulmonary disease ([address]  Chronic pain   
 back  GERD (gastroesophageal reflux disease)  Hypertension  Hyponatremia 10/13/2017  Ill-defined condition Gout  Psychiatric disorder Generalized Anxiety Disorder  Psychiatric disorder ETOH abuse Past Surgical History:  
Procedure Laterality Date  HX COLECTOMY  2000  
 diverticulitis  HX OTHER SURGICAL Left   
 mastoidectomy and inner ear surgery- age  18  
 HX OTHER SURGICAL    
 [facility]  HX TONSILLECTOMY  KNEE SCOPE,MED OR LAT MENIS REPAIR Left Family History Problem Relation Age of Onset  Cancer Mother   
     lung Halley Fester Arthritis-osteo Mother  Cancer Father   
     throat  Cancer Brother   
     prostate Social History Socioeconomic History  Marital status:  Spouse name: Not on file  Number of children: Not on file  Years of education: Not on file  Highest education level: Not on file Social Needs  Financial resource strain: Not on file  Food insecurity - worry: Not on file  Food insecurity - inability: Not on file  Transportation needs - medical: Not on file  Transportation needs - non-medical: Not on file Occupational History  Not on file Tobacco Use  Smoking status: Former Smoker Packs/day: 2.50 Years: 29.00 Pack years: 72.50 Last attempt to quit: 1993 Years since quittin.9  Smokeless tobacco: Never Used Substance and Sexual Activity  Alcohol use: No  
  Alcohol/week: 12.6 oz Types: 21 Cans of beer per week Comment: quit march 15 2017 reports was more than 21 beers  per week  Drug use: No  
 Sexual activity: Not on file Other Topics Concern  Not on file Social History Narrative  Not on file Allergies Allergen Reactions  Adhesive Tape-Silicones Other (comments) Pulls skin off  Sulfa (Sulfonamide Antibiotics) Shortness of Breath Current Outpatient Medications Medication Sig  pravastatin (PRAVACHOL) 10 mg tablet TAKE 1 TABLET BY MOUTH NIGHTLY  KLOR-CON 10 10 mEq tablet TAKE 2 TABLETS BY MOUTH EVERYDAY  furosemide (LASIX) 40 mg tablet TAKE 1 TABLET BY MOUTH DAILY. TAKE 1/2 TABLET DAILY AS NEEDED FOR INCREASE EDEMA  losartan (COZAAR) 25 mg tablet Take 1 Tab by mouth two (2) times a day. Replaces Valsartan 80 mg BID due to recall.  carvedilol (COREG) 6.25 mg tablet TAKE 1 TABLET BY MOUTH TWICE A DAY WITH MEALS  
 aspirin (ASPIRIN) 325 mg tablet Take 81 mg by mouth daily.  ketoconazole (NIZORAL) 2 % shampoo Apply  to affected area daily as needed for Itching. Apply to scalp and face up to three times a week as needed for flaky skin  oxyCODONE-acetaminophen (PERCOCET 10)  mg per tablet Take 1 Tab by mouth three (3) times daily as needed for Pain (Back pain).  loratadine (CLARITIN) 10 mg tablet Take 10 mg by mouth.  diclofenac EC (VOLTAREN) 75 mg EC tablet Take 75 mg by mouth two (2) times a day.  allopurinol (ZYLOPRIM) 300 mg tablet Take 300 mg by mouth daily.  omeprazole (PRILOSEC) 20 mg capsule Take 20 mg by mouth daily.  busPIRone (BUSPAR) 15 mg tablet Take 15 mg by mouth three (3) times daily.  citalopram (CELEXA) 40 mg tablet Take 40 mg by mouth daily.  budesonide-formoterol (SYMBICORT) 160-4.5 mcg/actuation HFA inhaler Take 2 Puffs by inhalation two (2) times daily as needed (Shortness of Breath).  tiotropium (SPIRIVA) 18 mcg inhalation capsule Take 1 Cap by inhalation daily.  albuterol (PROVENTIL HFA, VENTOLIN HFA, PROAIR HFA) 90 mcg/actuation inhaler Take 2 Puffs by inhalation daily as needed for Wheezing or Shortness of Breath. No current facility-administered medications for this visit. I have reviewed the problem list, allergy list, medical history, family, social history and medications. Review of Symptoms: 
 
Review of Systems Constitutional: Negative for chills, fever and weight loss. HENT: Negative for nosebleeds. Eyes: Negative for blurred vision and double vision. Respiratory: Negative for cough, shortness of breath and wheezing. Cardiovascular: Negative for chest pain, palpitations, orthopnea, leg swelling and PND. Gastrointestinal: Negative for abdominal pain, blood in stool, diarrhea, nausea and vomiting. Musculoskeletal: Negative for joint pain. Skin: Negative for rash. Neurological: Negative for dizziness, tingling and loss of consciousness. Endo/Heme/Allergies: Does not bruise/bleed easily. Physical Exam:   
 
General: Well developed, in no acute distress, cooperative and alert HEENT: No carotid bruits, no JVD, trach is midline. Neck Supple, PEERL, EOM intact. Heart:  reg rate and rhythm; normal S1/S2; no murmurs, gallops or rubs.  
Respiratory: Clear bilaterally x 4, no wheezing or rales Abdomen:   Soft, non-tender, no distention, no masses. + BS.  
Extremities:  Normal cap refill, no cyanosis, atraumatic. No edema. Neuro: A&Ox3, speech clear, gait stable. Skin: Skin color is normal. No rashes or lesions. Non diaphoretic Vascular: 2+ pulses symmetric in all extremities Vitals:  
 01/16/19 1559 01/16/19 1608 BP: 138/70 134/80 Pulse: 89 Resp: 16 SpO2: 97% Weight: 201 lb 4.8 oz (91.3 kg) Height: 5' 7\" (1.702 m) Cardiographics ECG: sinus rhythm Results for orders placed or performed during the hospital encounter of 10/11/17 EKG, 12 LEAD, INITIAL Result Value Ref Range Ventricular Rate 119 BPM  
 Atrial Rate 119 BPM  
 P-R Interval 176 ms QRS Duration 96 ms  
 Q-T Interval 314 ms QTC Calculation (Bezet) 441 ms Calculated P Axis 6 degrees Calculated R Axis 24 degrees Calculated T Axis -14 degrees Diagnosis Sinus tachycardia When compared with ECG of 12-SEP-2017 10:07, 
Vent. rate has increased BY  57 BPM 
T wave inversion now evident in Inferior leads Confirmed by Gamal Ramos ([id number]) on 10/11/2017 12:05:29 PM 
  
 
 
Cardiology Labs: 
Lab Results Component Value Date/Time Cholesterol, total 133 10/12/2017 03:04 AM  
 HDL Cholesterol 57 10/12/2017 03:04 AM  
 LDL, calculated 64.2 10/12/2017 03:04 AM  
 Triglyceride 59 10/12/2017 03:04 AM  
 CHOL/HDL Ratio 2.3 10/12/2017 03:04 AM  
 
 
Lab Results Component Value Date/Time Sodium 129 (L) 10/13/2017 03:25 AM  
 Potassium 3.3 (L) 10/13/2017 03:25 AM  
 Chloride 92 (L) 10/13/2017 03:25 AM  
 CO2 29 10/13/2017 03:25 AM  
 Anion gap 8 10/13/2017 03:25 AM  
 Glucose 112 (H) 10/13/2017 03:25 AM  
 BUN 19 10/13/2017 03:25 AM  
 Creatinine 0.61 (L) 10/13/2017 03:25 AM  
 BUN/Creatinine ratio 31 (H) 10/13/2017 03:25 AM  
 GFR est AA >60 10/13/2017 03:25 AM  
 GFR est non-AA >60 10/13/2017 03:25 AM  
 Calcium 8.5 10/13/2017 03:25 AM  
 Bilirubin, total 0.3 10/11/2017 07:43 AM  
 AST (SGOT) 18 10/11/2017 07:43 AM  
 Alk. phosphatase 115 10/11/2017 07:43 AM  
 Protein, total 7.3 10/11/2017 07:43 AM  
 Albumin 3.2 (L) 10/11/2017 07:43 AM  
 Globulin 4.1 (H) 10/11/2017 07:43 AM  
 A-G Ratio 0.8 (L) 10/11/2017 07:43 AM  
 ALT (SGPT) 25 10/11/2017 07:43 AM  
  
 
 
 Assessment: 
 
 Assessment: ICD-10-CM ICD-9-CM 1. NICM (nonischemic cardiomyopathy) (HCC) I42.8 425.4 2. Accelerated hypertension I10 401.0 AMB POC EKG ROUTINE W/ 12 LEADS, INTER & REP Plan: Accelerated hypertension BP well controlled; continue present medical management and low sodium diet. - AMB POC EKG ROUTINE W/ 12 LEADS, INTER & REP 
 
NICM (nonischemic cardiomyopathy) ([address] Resolved; echo in 1/2018 with LVEF 50-55% with mild diffuse hypokinesis. Cardiac cath in 10/2017 with minor luminal irregularities. Prev echo in 10/2017 with LVEF 15-25%. Continues to do well and appears euvolemic on exam today. Continue present medical management. Mixed hyperlipidemia:  
Lipids were at goal in our system in October 2017. Last prescription for statin was by nurse practitioner Jodryn Goodrich. His PCP is checking labs, he will ask his PCP for refills in the future. COPD status post stem cell treatment Counseled on diet and exercise- eventual goal of 30-60 minutes 5-7 times a week as per AHA guidelines. Follow up in 1 year, sooner as needed. Victoria Officer, FNP-BC Karan Tovar MD

## 2019-01-18 RX ORDER — CARVEDILOL 6.25 MG/1
TABLET ORAL
Qty: 180 TAB | Refills: 3 | Status: SHIPPED | OUTPATIENT
Start: 2019-01-18 | End: 2020-01-06

## 2019-02-12 RX ORDER — FUROSEMIDE 40 MG/1
TABLET ORAL
Qty: 90 TAB | Refills: 1 | Status: SHIPPED | OUTPATIENT
Start: 2019-02-12 | End: 2020-05-07

## 2019-02-12 RX ORDER — POTASSIUM CHLORIDE 750 MG/1
TABLET, FILM COATED, EXTENDED RELEASE ORAL
Qty: 180 TAB | Refills: 0 | Status: SHIPPED | OUTPATIENT
Start: 2019-02-12 | End: 2022-03-09

## 2019-05-13 RX ORDER — FUROSEMIDE 40 MG/1
TABLET ORAL
Qty: 90 TAB | Refills: 1 | Status: SHIPPED | OUTPATIENT
Start: 2019-05-13 | End: 2019-11-10 | Stop reason: SDUPTHER
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
## 2019-08-12 RX ORDER — LOSARTAN POTASSIUM 25 MG/1
TABLET ORAL
Qty: 180 TAB | Refills: 3 | Status: SHIPPED | OUTPATIENT
Start: 2019-08-12 | End: 2020-08-10

## 2019-11-10 RX ORDER — FUROSEMIDE 40 MG/1
TABLET ORAL
Qty: 90 TAB | Refills: 1 | Status: SHIPPED | OUTPATIENT
Start: 2019-11-10 | End: 2020-02-12 | Stop reason: SDUPTHER

## 2020-01-06 RX ORDER — CARVEDILOL 6.25 MG/1
TABLET ORAL
Qty: 180 TAB | Refills: 3 | Status: SHIPPED | OUTPATIENT
Start: 2020-01-06 | End: 2020-01-07

## 2020-01-07 RX ORDER — CARVEDILOL 6.25 MG/1
TABLET ORAL
Qty: 180 TAB | Refills: 3 | Status: SHIPPED | OUTPATIENT
Start: 2020-01-07 | End: 2021-01-18 | Stop reason: SDUPTHER

## 2020-02-12 ENCOUNTER — OFFICE VISIT (OUTPATIENT)
Dept: CARDIOLOGY CLINIC | Age: 70
End: 2020-02-12

## 2020-02-12 VITALS
WEIGHT: 183.3 LBS | HEART RATE: 88 BPM | OXYGEN SATURATION: 95 % | RESPIRATION RATE: 18 BRPM | BODY MASS INDEX: 28.77 KG/M2 | HEIGHT: 67 IN | SYSTOLIC BLOOD PRESSURE: 120 MMHG | DIASTOLIC BLOOD PRESSURE: 88 MMHG

## 2020-02-12 DIAGNOSIS — E78.2 MIXED HYPERLIPIDEMIA: ICD-10-CM

## 2020-02-12 DIAGNOSIS — J44.9 CHRONIC OBSTRUCTIVE PULMONARY DISEASE, UNSPECIFIED COPD TYPE (HCC): ICD-10-CM

## 2020-02-12 DIAGNOSIS — I10 ESSENTIAL HYPERTENSION: ICD-10-CM

## 2020-02-12 DIAGNOSIS — I47.1 PAT (PAROXYSMAL ATRIAL TACHYCARDIA) (HCC): ICD-10-CM

## 2020-02-12 DIAGNOSIS — I42.8 NICM (NONISCHEMIC CARDIOMYOPATHY) (HCC): Primary | ICD-10-CM

## 2020-02-12 RX ORDER — PREDNISONE 10 MG/1
10 TABLET ORAL DAILY
COMMUNITY
Start: 2020-01-29

## 2020-02-12 NOTE — PROGRESS NOTES
1. Have you been to the ER, urgent care clinic since your last visit? Hospitalized since your last visit? No.    2. Have you seen or consulted any other health care providers outside of the [address] [place] [address] since your last visit? Include any pap smears or colon screening.    No.      Chief Complaint   Patient presents with    Annual Exam     soboe and chest discomfort

## 2020-02-12 NOTE — PROGRESS NOTES
[address] [place] [address], [address] [facility]  [phone]     Subjective:      Manpreet Nunez is a 81 y.o. male is here for routine f/u. Last seen by us in 1/19. Continues to do stretches , mild exercise, short walks d/t limiting lilly. He did really well after his stem cell therapy 3/19 and 7/19 but has steadily getting more short winded in the last month  He lost almost 20 lbs by eating healthier. The patient denies chest pain, orthopnea, PND, LE edema, palpitations, syncope, or presyncope.        Patient Active Problem List    Diagnosis Date Noted    Hyponatremia 10/13/2017    Anemia 10/13/2017    PAT (paroxysmal atrial tachycardia) (HCC) 10/12/2017    Pulmonary edema 10/11/2017    Accelerated hypertension 10/11/2017    Elevated troponin 10/11/2017    Acute respiratory failure with hypoxia ([address] 10/11/2017    COPD (chronic obstructive pulmonary disease) ([address] 10/11/2017    NICM (nonischemic cardiomyopathy) ([address] 21/22/4126    Acute systolic congestive heart failure, NYHA class 4 ([address] 10/11/2017    Hip arthritis 09/21/2017    Degenerative joint disease of right hip 02/29/2016      Antolin Andrade MD  Past Medical History:   Diagnosis Date    Anemia 10/13/2017    Arthritis     Chronic obstructive pulmonary disease (HCC)     Chronic pain     back    GERD (gastroesophageal reflux disease)     Hypertension     Hyponatremia 10/13/2017    Ill-defined condition     Gout    Psychiatric disorder     Generalized Anxiety Disorder    Psychiatric disorder     ETOH abuse      Past Surgical History:   Procedure Laterality Date    HX COLECTOMY  2000    diverticulitis    HX OTHER SURGICAL Left     mastoidectomy and inner ear surgery- age  18   [place] OTHER SURGICAL      [facility]    HX TONSILLECTOMY      UT KNEE SCOPE,MED OR LAT MENIS REPAIR Left      Allergies   Allergen Reactions    Adhesive Tape-Silicones Other (comments) Pulls skin off    Sulfa (Sulfonamide Antibiotics) Shortness of Breath      Family History   Problem Relation Age of Onset    Cancer Mother         lung    Arthritis-osteo Mother     Cancer Father         throat    Cancer Brother         prostate      Social History     Socioeconomic History    Marital status:      Spouse name: Not on file    Number of children: Not on file    Years of education: Not on file    Highest education level: Not on file   Occupational History    Not on file   Social Needs    Financial resource strain: Not on file    Food insecurity:     Worry: Not on file     Inability: Not on file    Transportation needs:     Medical: Not on file     Non-medical: Not on file   Tobacco Use    Smoking status: Former Smoker     Packs/day: 2.50     Years: 29.00     Pack years: 72.50     Types: Cigarettes     Last attempt to quit: 1993     Years since quittin.0    Smokeless tobacco: Never Used   Substance and Sexual Activity    Alcohol use: No     Alcohol/week: 21.0 standard drinks     Types: 21 Cans of beer per week     Comment: quit march 15 2017 reports was more than 21 beers  per week     Drug use: No    Sexual activity: Not on file   Lifestyle    Physical activity:     Days per week: Not on file     Minutes per session: Not on file    Stress: Not on file   Relationships    Social connections:     Talks on phone: Not on file     Gets together: Not on file     Attends [employer] service: Not on file     Active member of club or organization: Not on file     Attends meetings of clubs or organizations: Not on file     Relationship status: Not on file    Intimate partner violence:     Fear of current or ex partner: Not on file     Emotionally abused: Not on file     Physically abused: Not on file     Forced sexual activity: Not on file   Other Topics Concern    Not on file   Social History Narrative    Not on file      Current Outpatient Medications   Medication Sig  predniSONE (DELTASONE) 10 mg tablet Take 10 mg by mouth daily.  carvedilol (COREG) 6.25 mg tablet TAKE 1 TABLET BY MOUTH TWICE A DAY WITH MEALS    losartan (COZAAR) 25 mg tablet TAKE 1 TAB BY MOUTH TWO (2) TIMES A DAY. REPLACES VALSARTAN 80 MG    furosemide (LASIX) 40 mg tablet TAKE 1 TABLET BY MOUTH DAILY. TAKE 1/2 TABLET DAILY AS NEEDED FOR INCREASE EDEMA    pravastatin (PRAVACHOL) 10 mg tablet TAKE 1 TABLET BY MOUTH NIGHTLY    ketoconazole (NIZORAL) 2 % shampoo Apply  to affected area daily as needed for Itching. Apply to scalp and face up to three times a week as needed for flaky skin    oxyCODONE-acetaminophen (PERCOCET 10)  mg per tablet Take 1 Tab by mouth three (3) times daily as needed for Pain (Back pain).  loratadine (CLARITIN) 10 mg tablet Take 10 mg by mouth.  diclofenac EC (VOLTAREN) 75 mg EC tablet Take 75 mg by mouth two (2) times a day.  allopurinol (ZYLOPRIM) 300 mg tablet Take 300 mg by mouth daily.  omeprazole (PRILOSEC) 20 mg capsule Take 20 mg by mouth daily.  busPIRone (BUSPAR) 15 mg tablet Take 15 mg by mouth three (3) times daily.  citalopram (CELEXA) 40 mg tablet Take 40 mg by mouth daily.  budesonide-formoterol (SYMBICORT) 160-4.5 mcg/actuation HFA inhaler Take 2 Puffs by inhalation two (2) times daily as needed (Shortness of Breath).  tiotropium (SPIRIVA) 18 mcg inhalation capsule Take 1 Cap by inhalation daily.  albuterol (PROVENTIL HFA, VENTOLIN HFA, PROAIR HFA) 90 mcg/actuation inhaler Take 2 Puffs by inhalation daily as needed for Wheezing or Shortness of Breath.  KLOR-CON 10 10 mEq tablet TAKE 2 TABLETS BY MOUTH EVERYDAY     No current facility-administered medications for this visit.           Review of Symptoms:  11 systems reviewed, negative other than as stated in the HPI    Physical ExamPhysical Exam:    Vitals:    02/12/20 1557 02/12/20 1608   BP: 134/86 120/88   Pulse: 88    Resp: 18    SpO2: 95%    Weight: 183 lb 4.8 oz (83.1 kg) Height: 5' 7\" (1.702 m)      Body mass index is 28.71 kg/m². General PE  Gen:  NAD  Mental Status - Alert. General Appearance - Not in acute distress. HEENT:  PERRL, no carotid bruits or JVD  Chest and Lung Exam   Inspection: Accessory muscles - No use of accessory muscles in breathing. Auscultation:   Breath sounds: - Normal.   Cardiovascular   Inspection: Jugular vein - Bilateral - Inspection Normal.   Palpation/Percussion:   Apical Impulse: - Normal.   Auscultation: Rhythm - Regular. Heart Sounds - S1 WNL and S2 WNL. No S3 or S4. Murmurs & Other Heart Sounds: Auscultation of the heart reveals - No Murmurs. Peripheral Vascular   Upper Extremity: Inspection - Bilateral - No Cyanotic nailbeds or Digital clubbing. Lower Extremity:   Palpation: Edema - Bilateral -trace to 1+ edema up to 6 inches above the ankles. Abdomen:   Soft, non-tender, bowel sounds are active. Neuro: A&O times 3, CN and motor grossly WNL    Labs:   Lab Results   Component Value Date/Time    Cholesterol, total 133 10/12/2017 03:04 AM    HDL Cholesterol 57 10/12/2017 03:04 AM    LDL, calculated 64.2 10/12/2017 03:04 AM    Triglyceride 59 10/12/2017 03:04 AM    CHOL/HDL Ratio 2.3 10/12/2017 03:04 AM     Lab Results   Component Value Date/Time    CK 83 10/11/2017 11:57 AM     Lab Results   Component Value Date/Time    Sodium 129 (L) 10/13/2017 03:25 AM    Potassium 3.3 (L) 10/13/2017 03:25 AM    Chloride 92 (L) 10/13/2017 03:25 AM    CO2 29 10/13/2017 03:25 AM    Anion gap 8 10/13/2017 03:25 AM    Glucose 112 (H) 10/13/2017 03:25 AM    BUN 19 10/13/2017 03:25 AM    Creatinine 0.61 (L) 10/13/2017 03:25 AM    BUN/Creatinine ratio 31 (H) 10/13/2017 03:25 AM    GFR est AA >60 10/13/2017 03:25 AM    GFR est non-AA >60 10/13/2017 03:25 AM    Calcium 8.5 10/13/2017 03:25 AM    Bilirubin, total 0.3 10/11/2017 07:43 AM    AST (SGOT) 18 10/11/2017 07:43 AM    Alk.  phosphatase 115 10/11/2017 07:43 AM    Protein, total 7.3 10/11/2017 07:43 AM Albumin 3.2 (L) 10/11/2017 07:43 AM    Globulin 4.1 (H) 10/11/2017 07:43 AM    A-G Ratio 0.8 (L) 10/11/2017 07:43 AM    ALT (SGPT) 25 10/11/2017 07:43 AM       EKG:  NSR      Assessment:     Assessment:      1. NICM (nonischemic cardiomyopathy) ([facility]    2. Chronic obstructive pulmonary disease, unspecified COPD type ([facility]    3. Mixed hyperlipidemia    4. PAT (paroxysmal atrial tachycardia) ([facility]    5. Essential hypertension        Orders Placed This Encounter    AMB POC EKG ROUTINE W/ 12 LEADS, INTER & REP     Order Specific Question:   Reason for Exam:     Answer:   routine    predniSONE (DELTASONE) 10 mg tablet     Sig: Take 10 mg by mouth daily. Plan:      Patient presents for f/u, doing well and stable from cardiac standpoint. Last seen by us in 1/19. Continues to do stretches , mild exercise, short walks d/t limiting lilly. He did really well after his stem cell therapy 3/19 and 7/19 but has steadily getting more short winded in the last month  He lost almost 20 lbs by eating healthier. HFrEF, NICM  Improved systolic function, 17-06% per limited echo in 01/18   Hx EF 15-30%, mod-severe MR, G 2 diastolic dysfunction per echo in 10/17  Wt down 20 lbs. Continue current dosing of Diovan, carvedilol. Wants to try lasix every other day since leg swelling has resolved-I pointed out that he still has trace to 1+ edema and continue same dose  Recommend repeat echo to rule out recurrent cardiomyopathy and/or cor pulmonale due to COPD       Hypertension:   Mildly up---normally 120s/70s-low 80s. Mild, nonobstructive CAD per cardiac cath 10/17     HLD  10/17 LDL at 64. On statin.   Further refill per dr Lamont Parisi       COPD  On inhalers, no followed by any pulmonary docs in [facility] stem cell therapy with [facility] [address] in [facility]. last year, in 2018 he had it in [place] to establish with a pulmonologist or possibly touch base with the lung [place] [facility]. since his shortness of breath is getting worse and pulmonary medications are constantly changing      Counseled on diet and exercise- eventual goal of 30-60 minutes 5-7 times a week as per AHA guidelines.          Continue current care and f/u in 1 year, sooner if worsening symptoms or new concerns on echocardiogram.         Can Redmond MD

## 2020-02-12 NOTE — LETTER
2/12/20 Patient: Suellen Barlow YOB: 1950 Date of Visit: 2/12/2020 Gianna Kim MD 
[address] [address] [address] VIA Facsimile: [phone] Dear Gianna Kim MD, Thank you for referring Mr. Tonio Gonzalez to [address] [place] [address] for evaluation. My notes for this consultation are attached. If you have questions, please do not hesitate to call me. I look forward to following your patient along with you.  
 
 
Sincerely, 
 
Nury Worthy MD

## 2020-02-27 ENCOUNTER — TELEPHONE (OUTPATIENT)
Dept: CARDIOLOGY CLINIC | Age: 70
End: 2020-02-27

## 2020-02-27 NOTE — TELEPHONE ENCOUNTER
----- Message from Otf Lemons NP sent at 2/27/2020  9:09 AM EST -----  Nicolette,    Please call the patient and inform that echocardiogram is normal, ejection fraction 55-60%. No concern for heart failure at this time. However, his right ventricle is dilated, which is a new finding from last visit. It appears his COPD is starting to affect his heart function and is likely the cause of his shortness of breath. I would strongly encourage him to find a pulmonologist in the area, as Dr. Eliane Macias already advised. We can refer him if needed. He will need further management of COPD.     Thanks,  Jackquelyn Blizzard

## 2020-08-10 RX ORDER — LOSARTAN POTASSIUM 25 MG/1
TABLET ORAL
Qty: 180 TAB | Refills: 3 | Status: SHIPPED | OUTPATIENT
Start: 2020-08-10 | End: 2021-07-30

## 2020-11-24 ENCOUNTER — TRANSCRIBE ORDER (OUTPATIENT)
Dept: SCHEDULING | Age: 70
End: 2020-11-24

## 2020-11-24 DIAGNOSIS — D47.2 MONOCLONAL GAMMOPATHY: ICD-10-CM

## 2020-11-24 DIAGNOSIS — C90.00 MULTIPLE MYELOMA NOT HAVING ACHIEVED REMISSION (HCC): Primary | ICD-10-CM

## 2021-01-18 RX ORDER — CARVEDILOL 6.25 MG/1
6.25 TABLET ORAL 2 TIMES DAILY
Qty: 180 TAB | Refills: 0 | Status: SHIPPED | OUTPATIENT
Start: 2021-01-18 | End: 2021-05-17

## 2021-03-03 ENCOUNTER — OFFICE VISIT (OUTPATIENT)
Dept: CARDIOLOGY CLINIC | Age: 71
End: 2021-03-03
Payer: COMMERCIAL

## 2021-03-03 VITALS
HEIGHT: 67 IN | HEART RATE: 75 BPM | DIASTOLIC BLOOD PRESSURE: 86 MMHG | RESPIRATION RATE: 18 BRPM | SYSTOLIC BLOOD PRESSURE: 120 MMHG | BODY MASS INDEX: 31.86 KG/M2 | OXYGEN SATURATION: 95 % | WEIGHT: 203 LBS

## 2021-03-03 DIAGNOSIS — E78.2 MIXED HYPERLIPIDEMIA: ICD-10-CM

## 2021-03-03 DIAGNOSIS — I47.1 PAT (PAROXYSMAL ATRIAL TACHYCARDIA) (HCC): ICD-10-CM

## 2021-03-03 DIAGNOSIS — I10 ESSENTIAL HYPERTENSION: ICD-10-CM

## 2021-03-03 DIAGNOSIS — I42.8 NICM (NONISCHEMIC CARDIOMYOPATHY) (HCC): Primary | ICD-10-CM

## 2021-03-03 DIAGNOSIS — J44.9 CHRONIC OBSTRUCTIVE PULMONARY DISEASE, UNSPECIFIED COPD TYPE (HCC): ICD-10-CM

## 2021-03-03 PROCEDURE — 99214 OFFICE O/P EST MOD 30 MIN: CPT | Performed by: INTERNAL MEDICINE

## 2021-03-03 PROCEDURE — 93000 ELECTROCARDIOGRAM COMPLETE: CPT | Performed by: INTERNAL MEDICINE

## 2021-03-03 NOTE — LETTER
3/6/2021 Patient: Pat Ross YOB: 1950 Date of Visit: 3/3/2021 Mylene Pierson MD 
[address] [address] [address] Via Fax: [phone] Dear Mylene Pierson MD, Thank you for referring Mr. Jonathan Dior to [address] [place] [address] for evaluation. My notes for this consultation are attached. If you have questions, please do not hesitate to call me. I look forward to following your patient along with you.  
 
 
Sincerely, 
 
Ginette Bhatti MD

## 2021-03-03 NOTE — PROGRESS NOTES
[address] [facility]  [phone]     Subjective:      Belkys Kingston is a 78 y.o. male is here for routine f/u. He has pmhx NICM, nonobstructive CAD, HTN, HLD and COPD hx stem cell therapy. Last seen us in 2/2020. The patient denies chest pain, orthopnea, PND, LE edema, palpitations, syncope, or presyncope. His SOB/COWART is at his baseline. Overall doing well. He is still getting stem cell treatment, now in [place]. Last treatment in November 2020, going again in April. Echocardiogram  2/2020    · Normal cavity size, wall thickness and systolic function (ejection fraction normal). Estimated left ventricular ejection fraction is 55 - 60%. No regional wall motion abnormality noted. Age-appropriate left ventricular diastolic function. · Mildly dilated right ventricle. · Mild aortic valve sclerosis.          Patient Active Problem List    Diagnosis Date Noted    Hyponatremia 10/13/2017    Anemia 10/13/2017    PAT (paroxysmal atrial tachycardia) (HCC) 10/12/2017    Pulmonary edema 10/11/2017    Accelerated hypertension 10/11/2017    Elevated troponin 10/11/2017    Acute respiratory failure with hypoxia ([address] 10/11/2017    COPD (chronic obstructive pulmonary disease) ([address] 10/11/2017    NICM (nonischemic cardiomyopathy) ([address] 58/51/5755    Acute systolic congestive heart failure, NYHA class 4 ([address] 10/11/2017    Hip arthritis 09/21/2017    Degenerative joint disease of right hip 02/29/2016      Ramon Dominguez MD  Past Medical History:   Diagnosis Date    Anemia 10/13/2017    Arthritis     Chronic obstructive pulmonary disease (HCC)     Chronic pain     back    GERD (gastroesophageal reflux disease)     Hypertension     Hyponatremia 10/13/2017    Ill-defined condition     Gout    Psychiatric disorder     Generalized Anxiety Disorder    Psychiatric disorder     ETOH abuse      Past Surgical History:   Procedure Laterality Date    HX OTHER SURGICAL Left     mastoidectomy and inner ear surgery- age  18    HX OTHER SURGICAL      [facility]    HX TONSILLECTOMY      HX TOTAL COLECTOMY      diverticulitis    MI KNEE SCOPE,MED OR LAT MENIS REPAIR Left      Allergies   Allergen Reactions    Adhesive Tape-Silicones Other (comments)     Pulls skin off    Sulfa (Sulfonamide Antibiotics) Shortness of Breath      Family History   Problem Relation Age of Onset    Cancer Mother         lung    Arthritis-osteo Mother     Cancer Father         throat    Cancer Brother         prostate      Social History     Socioeconomic History    Marital status:      Spouse name: Not on file    Number of children: Not on file    Years of education: Not on file    Highest education level: Not on file   Occupational History    Not on file   Social Needs    Financial resource strain: Not on file    Food insecurity     Worry: Not on file     Inability: Not on file    Transportation needs     Medical: Not on file     Non-medical: Not on file   Tobacco Use    Smoking status: Former Smoker     Packs/day: 2.50     Years: 29.00     Pack years: 72.50     Types: Cigarettes     Quit date: 1993     Years since quittin.0    Smokeless tobacco: Never Used   Substance and Sexual Activity    Alcohol use: No     Alcohol/week: 21.0 standard drinks     Types: 21 Cans of beer per week     Comment: quit march 15 2017 reports was more than 21 beers  per week     Drug use: No    Sexual activity: Not on file   Lifestyle    Physical activity     Days per week: Not on file     Minutes per session: Not on file    Stress: Not on file   Relationships    Social connections     Talks on phone: Not on file     Gets together: Not on file     Attends Caodaism service: Not on file     Active member of club or organization: Not on file     Attends meetings of clubs or organizations: Not on file     Relationship status: Not on file    Intimate partner violence     Fear of current or ex partner: Not on file     Emotionally abused: Not on file     Physically abused: Not on file     Forced sexual activity: Not on file   Other Topics Concern    Not on file   Social History Narrative    Not on file      Current Outpatient Medications   Medication Sig    carvediloL (COREG) 6.25 mg tablet Take 1 Tab by mouth two (2) times a day.  furosemide (LASIX) 40 mg tablet TAKE 1 TABLET BY MOUTH DAILY. TAKE 1/2 TABLET DAILY AS NEEDED FOR INCREASE EDEMA    losartan (COZAAR) 25 mg tablet TAKE 1 TABLET BY MOUTH TWICE DAILY    predniSONE (DELTASONE) 10 mg tablet Take 10 mg by mouth daily.  pravastatin (PRAVACHOL) 10 mg tablet TAKE 1 TABLET BY MOUTH NIGHTLY    ketoconazole (NIZORAL) 2 % shampoo Apply  to affected area daily as needed for Itching. Apply to scalp and face up to three times a week as needed for flaky skin    oxyCODONE-acetaminophen (PERCOCET 10)  mg per tablet Take 1 Tab by mouth three (3) times daily as needed for Pain (Back pain).  loratadine (CLARITIN) 10 mg tablet Take 10 mg by mouth.  diclofenac EC (VOLTAREN) 75 mg EC tablet Take 75 mg by mouth two (2) times a day.  allopurinol (ZYLOPRIM) 300 mg tablet Take 300 mg by mouth daily.  omeprazole (PRILOSEC) 20 mg capsule Take 20 mg by mouth daily.  busPIRone (BUSPAR) 15 mg tablet Take 15 mg by mouth three (3) times daily.  citalopram (CELEXA) 40 mg tablet Take 40 mg by mouth daily.  budesonide-formoterol (SYMBICORT) 160-4.5 mcg/actuation HFA inhaler Take 2 Puffs by inhalation two (2) times a day.  tiotropium (SPIRIVA) 18 mcg inhalation capsule Take 1 Cap by inhalation daily.  albuterol (PROVENTIL HFA, VENTOLIN HFA, PROAIR HFA) 90 mcg/actuation inhaler Take 2 Puffs by inhalation daily as needed for Wheezing or Shortness of Breath.     KLOR-CON 10 10 mEq tablet TAKE 2 TABLETS BY MOUTH EVERYDAY     No current facility-administered medications for this visit. Review of Symptoms:  11 systems reviewed, negative other than as stated in the HPI    Physical ExamPhysical Exam:    Vitals:    03/03/21 1543   BP: 120/86   Pulse: 75   Resp: 18   SpO2: 95%   Weight: 203 lb (92.1 kg)   Height: 5' 7\" (1.702 m)     Body mass index is 31.79 kg/m². General PE  Gen:  NAD  Mental Status - Alert. General Appearance - Not in acute distress. HEENT:  PERRL, no carotid bruits or JVD  Chest and Lung Exam   Inspection: Accessory muscles - No use of accessory muscles in breathing. Auscultation:   Breath sounds: - Normal.   Cardiovascular   Inspection: Jugular vein - Bilateral - Inspection Normal.   Palpation/Percussion:   Apical Impulse: - Normal.   Auscultation: Rhythm - Regular. Heart Sounds - S1 WNL and S2 WNL. No S3 or S4. Murmurs & Other Heart Sounds: Auscultation of the heart reveals - No Murmurs. Peripheral Vascular   Upper Extremity: Inspection - Bilateral - No Cyanotic nailbeds or Digital clubbing. Lower Extremity:   Palpation: Edema - Bilateral -trace to 1+ edema up to 6 inches above the ankles. Abdomen:   Soft, non-tender, bowel sounds are active.   Neuro: A&O times 3, CN and motor grossly WNL    Labs:   Lab Results   Component Value Date/Time    Cholesterol, total 133 10/12/2017 03:04 AM    HDL Cholesterol 57 10/12/2017 03:04 AM    LDL, calculated 64.2 10/12/2017 03:04 AM    Triglyceride 59 10/12/2017 03:04 AM    CHOL/HDL Ratio 2.3 10/12/2017 03:04 AM     Lab Results   Component Value Date/Time    CK 83 10/11/2017 11:57 AM     Lab Results   Component Value Date/Time    Sodium 129 (L) 10/13/2017 03:25 AM    Potassium 3.3 (L) 10/13/2017 03:25 AM    Chloride 92 (L) 10/13/2017 03:25 AM    CO2 29 10/13/2017 03:25 AM    Anion gap 8 10/13/2017 03:25 AM    Glucose 112 (H) 10/13/2017 03:25 AM    BUN 19 10/13/2017 03:25 AM    Creatinine 0.61 (L) 10/13/2017 03:25 AM    BUN/Creatinine ratio 31 (H) 10/13/2017 03:25 AM    GFR est AA >60 10/13/2017 03:25 AM    GFR est non-AA >60 10/13/2017 03:25 AM    Calcium 8.5 10/13/2017 03:25 AM    Bilirubin, total 0.3 10/11/2017 07:43 AM    Alk. phosphatase 115 10/11/2017 07:43 AM    Protein, total 7.3 10/11/2017 07:43 AM    Albumin 3.2 (L) 10/11/2017 07:43 AM    Globulin 4.1 (H) 10/11/2017 07:43 AM    A-G Ratio 0.8 (L) 10/11/2017 07:43 AM    ALT (SGPT) 25 10/11/2017 07:43 AM       EKG:  NSR      Assessment:     Assessment:      1. NICM (nonischemic cardiomyopathy) ([facility]    2. PAT (paroxysmal atrial tachycardia) ([facility]    3. Essential hypertension    4. Mixed hyperlipidemia    5. Chronic obstructive pulmonary disease, unspecified COPD type ([facility]        Orders Placed This Encounter    AMB POC EKG ROUTINE W/ 12 LEADS, INTER & REP     Order Specific Question:   Reason for Exam:     Answer:   routine        Plan:     HFrEF, NICM  EF 55-60% with no significant valve issues per echo in 8/5897  Improved systolic function, 13-05% per limited echo in 01/18   Hx EF 15-30%, mod-severe MR, G 2 diastolic dysfunction per echo in 10/17  Denies symptoms today. Appears compensated. Continue current dosing of Losartan, carvedilol. Hypertension:   Controlled with current therapy     Mild, nonobstructive CAD per cardiac cath 10/17     HLD  10/17 LDL at 64. On statin. Lipids and labs managed by PCP. Further refill per dr Jacey Noonan       COPD  On inhalers, not followed by any pulmonary docs in [place]- encouraged to establish local pulmonary MD  Underwent stem cell therapy since 2018. Last time completed in 11/2020 in [place]. Returns in April    Counseled on diet and exercise- eventual goal of 30-60 minutes 5-7 times a week as per AHA guidelines.          Continue current care and f/u in 1 year         Antoni Bañuelos MD

## 2021-03-03 NOTE — PROGRESS NOTES
1. Have you been to the ER, urgent care clinic since your last visit? Hospitalized since your last visit? No.    2. Have you seen or consulted any other health care providers outside of the [address] [place] [address] since your last visit? Include any pap smears or colon screening.    No.      Chief Complaint   Patient presents with    Irregular Heart Beat    Hypertension    Annual Exam     pt denies any cardiac symptoms 98.6

## 2021-03-29 ENCOUNTER — TELEPHONE (OUTPATIENT)
Dept: CARDIOLOGY CLINIC | Age: 71
End: 2021-03-29

## 2021-03-29 NOTE — TELEPHONE ENCOUNTER
[facility] concerned with use of furosemide & Diclofenac due to CAD and anti-inflammatory medication

## 2021-03-29 NOTE — TELEPHONE ENCOUNTER
Patient unable to get by without the diclofenac due to his knee condition questions if can change the furosemide if needed he has been on this regimen for years.

## 2021-07-30 RX ORDER — LOSARTAN POTASSIUM 25 MG/1
TABLET ORAL
Qty: 180 TABLET | Refills: 3 | Status: SHIPPED | OUTPATIENT
Start: 2021-07-30 | End: 2022-03-09 | Stop reason: SDUPTHER

## 2021-08-23 ENCOUNTER — TRANSCRIBE ORDER (OUTPATIENT)
Dept: SCHEDULING | Age: 71
End: 2021-08-23

## 2021-08-23 DIAGNOSIS — C90.00 MULTIPLE MYELOMA NOT HAVING ACHIEVED REMISSION (HCC): ICD-10-CM

## 2021-08-23 DIAGNOSIS — D47.2 MONOCLONAL GAMMOPATHIES: Primary | ICD-10-CM

## 2021-08-24 NOTE — ED NOTES
Patient provided meal tray at this time; patient states he is not hungry at this time. I have seen and examined the patient on the patient´s visit date. I have reviewed the note written by Moses MEREDITH, on that visit day. I have supervised and participated as necessary in the performance of procedures indicated for patient management and was available at all phases of the patient´s visit when needed. We discussed the history, physical exam findings, management plan, and  medical decision making. I have made my additions, exceptions, and revisions within the chart and I agree with H and P as documented in its entirety. The data and my interpretation of any data collected from labs, interventions and imaging appear below as well as my independent medical decision making and considerations

## 2021-09-21 ENCOUNTER — TRANSCRIBE ORDER (OUTPATIENT)
Dept: GENERAL RADIOLOGY | Age: 71
End: 2021-09-21

## 2021-09-21 ENCOUNTER — HOSPITAL ENCOUNTER (OUTPATIENT)
Dept: GENERAL RADIOLOGY | Age: 71
Discharge: HOME | End: 2021-09-21
Attending: FAMILY MEDICINE
Payer: COMMERCIAL

## 2021-09-21 DIAGNOSIS — M25.552 BILATERAL HIP PAIN: Primary | ICD-10-CM

## 2021-09-21 DIAGNOSIS — M25.551 BILATERAL HIP PAIN: ICD-10-CM

## 2021-09-21 DIAGNOSIS — M25.551 BILATERAL HIP PAIN: Primary | ICD-10-CM

## 2021-09-21 DIAGNOSIS — M25.552 BILATERAL HIP PAIN: ICD-10-CM

## 2021-09-21 PROCEDURE — 72100 X-RAY EXAM L-S SPINE 2/3 VWS: CPT

## 2021-09-21 PROCEDURE — 73521 X-RAY EXAM HIPS BI 2 VIEWS: CPT

## 2022-03-09 ENCOUNTER — OFFICE VISIT (OUTPATIENT)
Dept: CARDIOLOGY CLINIC | Age: 72
End: 2022-03-09
Payer: MEDICARE

## 2022-03-09 VITALS
OXYGEN SATURATION: 97 % | RESPIRATION RATE: 18 BRPM | DIASTOLIC BLOOD PRESSURE: 86 MMHG | SYSTOLIC BLOOD PRESSURE: 150 MMHG | WEIGHT: 194.25 LBS | HEART RATE: 81 BPM | HEIGHT: 67 IN | BODY MASS INDEX: 30.49 KG/M2

## 2022-03-09 DIAGNOSIS — I25.10 CORONARY ARTERY DISEASE INVOLVING NATIVE CORONARY ARTERY OF NATIVE HEART WITHOUT ANGINA PECTORIS: ICD-10-CM

## 2022-03-09 DIAGNOSIS — I42.8 NICM (NONISCHEMIC CARDIOMYOPATHY) (HCC): Primary | ICD-10-CM

## 2022-03-09 DIAGNOSIS — I10 PRIMARY HYPERTENSION: ICD-10-CM

## 2022-03-09 PROBLEM — I50.21 ACUTE SYSTOLIC CONGESTIVE HEART FAILURE, NYHA CLASS 4 (HCC): Status: RESOLVED | Noted: 2017-10-11 | Resolved: 2022-03-09

## 2022-03-09 PROCEDURE — G8754 DIAS BP LESS 90: HCPCS | Performed by: NURSE PRACTITIONER

## 2022-03-09 PROCEDURE — G8432 DEP SCR NOT DOC, RNG: HCPCS | Performed by: NURSE PRACTITIONER

## 2022-03-09 PROCEDURE — G8536 NO DOC ELDER MAL SCRN: HCPCS | Performed by: NURSE PRACTITIONER

## 2022-03-09 PROCEDURE — 93010 ELECTROCARDIOGRAM REPORT: CPT | Performed by: NURSE PRACTITIONER

## 2022-03-09 PROCEDURE — G0463 HOSPITAL OUTPT CLINIC VISIT: HCPCS | Performed by: NURSE PRACTITIONER

## 2022-03-09 PROCEDURE — G8753 SYS BP > OR = 140: HCPCS | Performed by: NURSE PRACTITIONER

## 2022-03-09 PROCEDURE — 93005 ELECTROCARDIOGRAM TRACING: CPT | Performed by: NURSE PRACTITIONER

## 2022-03-09 PROCEDURE — G8427 DOCREV CUR MEDS BY ELIG CLIN: HCPCS | Performed by: NURSE PRACTITIONER

## 2022-03-09 PROCEDURE — 99214 OFFICE O/P EST MOD 30 MIN: CPT | Performed by: NURSE PRACTITIONER

## 2022-03-09 PROCEDURE — 1101F PT FALLS ASSESS-DOCD LE1/YR: CPT | Performed by: NURSE PRACTITIONER

## 2022-03-09 PROCEDURE — G8417 CALC BMI ABV UP PARAM F/U: HCPCS | Performed by: NURSE PRACTITIONER

## 2022-03-09 PROCEDURE — G9711 PT HX TOT COL OR COLON CA: HCPCS | Performed by: NURSE PRACTITIONER

## 2022-03-09 RX ORDER — LOSARTAN POTASSIUM 50 MG/1
50 TABLET ORAL 2 TIMES DAILY
Qty: 90 TABLET | Refills: 3
Start: 2022-03-09

## 2022-03-09 RX ORDER — CLOTRIMAZOLE AND BETAMETHASONE DIPROPIONATE 10; .5 MG/ML; MG/ML
30 LOTION TOPICAL DAILY
COMMUNITY
Start: 2021-12-28

## 2022-03-09 NOTE — PROGRESS NOTES
Chief Complaint   Patient presents with    Cardiomyopathy     Annual follow up- Denies cardiac sx. Patient did mentioned his BP readings are high. 1. Have you been to the ER, urgent care clinic since your last visit? Hospitalized since your last visit? No    2. Have you seen or consulted any other health care providers outside of the [address] [place] [address] since your last visit?  No

## 2022-03-09 NOTE — PROGRESS NOTES
Neelam Garcia, [facility]    Subjective/HPI:     Sourav Shields is a 73 y.o. male is here for routine f/u. He has a PMHx of non-ischemic cardiomyopathy, non-obstructive CAD, HTN, HLD and COPD. He is doing well. Was getting stem cell therapy for COPD in [place], the the location closed and started going to [place]. Now that location has closed and the only location open is now in [place], [place]. Not planning on traveling there right now. Otherwise denies complaints of chest pains, dizziness, orthopnea, PND or edema. Denies palpitation symptoms, shortness of breath. Has noticed BP up at home. Not feeling symptomatic, but is concerned about higher readings. Current Outpatient Medications on File Prior to Visit   Medication Sig Dispense Refill    clotrimazole-betamethasone (LOTRISONE) 1-0.05 % lotion Apply 30 mL to affected area daily.  carvediloL (COREG) 6.25 mg tablet TAKE 1 TABLET BY MOUTH TWICE A  Tab 3    predniSONE (DELTASONE) 10 mg tablet Take 10 mg by mouth daily.  pravastatin (PRAVACHOL) 10 mg tablet TAKE 1 TABLET BY MOUTH NIGHTLY 90 Tab 3    ketoconazole (NIZORAL) 2 % shampoo Apply  to affected area daily as needed for Itching. Apply to scalp and face up to three times a week as needed for flaky skin      oxyCODONE-acetaminophen (PERCOCET 10)  mg per tablet Take 1 Tab by mouth three (3) times daily as needed for Pain (Back pain).  loratadine (CLARITIN) 10 mg tablet Take 10 mg by mouth.  diclofenac EC (VOLTAREN) 75 mg EC tablet Take 75 mg by mouth two (2) times a day.  allopurinol (ZYLOPRIM) 300 mg tablet Take 300 mg by mouth daily.  omeprazole (PRILOSEC) 20 mg capsule Take 20 mg by mouth daily.  busPIRone (BUSPAR) 15 mg tablet Take 15 mg by mouth three (3) times daily.  citalopram (CELEXA) 40 mg tablet Take 40 mg by mouth daily.       budesonide-formoterol (SYMBICORT) 160-4.5 mcg/actuation HFA inhaler Take 2 Puffs by inhalation two (2) times a day.  tiotropium (SPIRIVA) 18 mcg inhalation capsule Take 1 Cap by inhalation daily.  albuterol (PROVENTIL HFA, VENTOLIN HFA, PROAIR HFA) 90 mcg/actuation inhaler Take 2 Puffs by inhalation daily as needed for Wheezing or Shortness of Breath.  [DISCONTINUED] losartan (COZAAR) 25 mg tablet TAKE 1 TABLET BY MOUTH TWICE A  Tablet 3    [DISCONTINUED] furosemide (LASIX) 40 mg tablet TAKE 1 TABLET BY MOUTH DAILY. TAKE 1/2 TABLET DAILY AS NEEDED FOR INCREASE EDEMA (Patient not taking: Reported on 3/9/2022) 90 Tab 3    [DISCONTINUED] KLOR-CON 10 10 mEq tablet TAKE 2 TABLETS BY MOUTH EVERYDAY (Patient not taking: Reported on 3/9/2022) 180 Tab 0     No current facility-administered medications on file prior to visit. Review of Symptoms:    Review of Systems   Constitutional: Negative for chills, fever and weight loss. HENT: Negative for nosebleeds. Eyes: Negative for blurred vision and double vision. Respiratory: Negative for cough, shortness of breath and wheezing. Cardiovascular: Negative for chest pain, palpitations, orthopnea, leg swelling and PND. Skin: Negative for rash. Neurological: Negative for dizziness and loss of consciousness. Physical Exam:      General: Well developed, in no acute distress, cooperative and alert  Heart:  reg rate and rhythm; normal S1/S2; no murmurs, no gallops or rubs. Respiratory: Clear bilaterally x 4, no wheezing or rales  Extremities:  Normal cap refill, no cyanosis, atraumatic. No edema. Vascular: 2+ pulses symmetric in all extremities    Vitals:    03/09/22 1325 03/09/22 1336   BP: (!) 156/92 (!) 150/86   BP 1 Location: Left arm Left arm   BP Patient Position: Sitting Supine   BP Cuff Size: Large adult Large adult   Pulse: 81    Resp: 18    Height: 5' 7\" (1.702 m)    Weight: 194 lb 4 oz (88.1 kg)    SpO2: 97%        ECG done today shows sinus rhythm     Assessment:       ICD-10-CM ICD-9-CM    1.  NICM (nonischemic cardiomyopathy) ([facility])  I42.8 425.4 AMB POC EKG ROUTINE W/ 12 LEADS, INTER & REP   2. Coronary artery disease involving native coronary artery of native heart without angina pectoris  I25.10 414.01    3. Primary hypertension  I10 401.9         Plan:     1. NICM (nonischemic cardiomyopathy) ([facility] [address]  Hx of non-ischemic cardiomyopathy, now resolved  Ech done 2/2020 with preserved LVEF 55-60% without significant valvular pathology  Well compensated on exam today  Continue losartan, carvedilol    2. Coronary artery disease involving native coronary artery of native heart without angina pectoris  Mild, non-obstructive CAD per cath in 10/2017  Without anginal or anginal equivalent symptoms  Continue ASA, BB, and statin therapy    3. Primary hypertension  Increase losartan 100 mg daily  Will see PCP next week for follow up    4.   Mixed hyperlipidemia  Continue statin therapy and low fat, low cholesterol diet  Lipids managed by PCP -- will get records for review    F/u with Dr. Levi Cesar in 1 year      Gino Joiner NP

## 2022-03-18 PROBLEM — M16.10 HIP ARTHRITIS: Status: ACTIVE | Noted: 2017-09-21

## 2022-03-18 PROBLEM — R77.8 ELEVATED TROPONIN: Status: ACTIVE | Noted: 2017-10-11

## 2022-03-18 PROBLEM — R79.89 ELEVATED TROPONIN: Status: ACTIVE | Noted: 2017-10-11

## 2022-03-18 PROBLEM — D64.9 ANEMIA: Status: ACTIVE | Noted: 2017-10-13

## 2022-03-18 PROBLEM — I25.10 CORONARY ARTERY DISEASE INVOLVING NATIVE CORONARY ARTERY OF NATIVE HEART WITHOUT ANGINA PECTORIS: Status: ACTIVE | Noted: 2022-03-09

## 2022-03-19 PROBLEM — J81.1 PULMONARY EDEMA: Status: ACTIVE | Noted: 2017-10-11

## 2022-03-19 PROBLEM — I47.1 PAT (PAROXYSMAL ATRIAL TACHYCARDIA) (HCC): Status: ACTIVE | Noted: 2017-10-12

## 2022-03-19 PROBLEM — J44.9 COPD (CHRONIC OBSTRUCTIVE PULMONARY DISEASE) (HCC): Status: ACTIVE | Noted: 2017-10-11

## 2022-03-19 PROBLEM — J96.01 ACUTE RESPIRATORY FAILURE WITH HYPOXIA (HCC): Status: ACTIVE | Noted: 2017-10-11

## 2022-03-19 PROBLEM — I47.19 PAT (PAROXYSMAL ATRIAL TACHYCARDIA): Status: ACTIVE | Noted: 2017-10-12

## 2022-03-19 PROBLEM — E87.1 HYPONATREMIA: Status: ACTIVE | Noted: 2017-10-13

## 2022-03-20 PROBLEM — I42.8 NICM (NONISCHEMIC CARDIOMYOPATHY) (HCC): Status: ACTIVE | Noted: 2017-10-11

## 2022-03-20 PROBLEM — I10 ACCELERATED HYPERTENSION: Status: ACTIVE | Noted: 2017-10-11

## 2022-05-16 RX ORDER — CARVEDILOL 6.25 MG/1
TABLET ORAL
Qty: 180 TABLET | Refills: 3 | Status: SHIPPED | OUTPATIENT
Start: 2022-05-16

## 2022-06-06 ENCOUNTER — TRANSCRIBE ORDER (OUTPATIENT)
Dept: SCHEDULING | Age: 72
End: 2022-06-06

## 2022-06-06 DIAGNOSIS — M51.36 DDD (DEGENERATIVE DISC DISEASE), LUMBAR: ICD-10-CM

## 2022-06-06 DIAGNOSIS — M47.816 OSTEOARTHRITIS OF LUMBAR SPINE, UNSPECIFIED SPINAL OSTEOARTHRITIS COMPLICATION STATUS: ICD-10-CM

## 2022-06-06 DIAGNOSIS — M47.816 SPONDYLOSIS OF LUMBAR REGION WITHOUT MYELOPATHY OR RADICULOPATHY: Primary | ICD-10-CM

## 2022-06-06 DIAGNOSIS — M54.16 LUMBAR RADICULOPATHY: ICD-10-CM

## 2022-06-17 ENCOUNTER — HOSPITAL ENCOUNTER (OUTPATIENT)
Dept: MRI IMAGING | Age: 72
Discharge: HOME | End: 2022-06-17
Attending: NURSE PRACTITIONER
Payer: MEDICARE

## 2022-06-17 DIAGNOSIS — M47.816 OSTEOARTHRITIS OF LUMBAR SPINE, UNSPECIFIED SPINAL OSTEOARTHRITIS COMPLICATION STATUS: ICD-10-CM

## 2022-06-17 DIAGNOSIS — M54.16 LUMBAR RADICULOPATHY: ICD-10-CM

## 2022-06-17 DIAGNOSIS — M51.36 DDD (DEGENERATIVE DISC DISEASE), LUMBAR: ICD-10-CM

## 2022-06-17 DIAGNOSIS — M47.816 SPONDYLOSIS OF LUMBAR REGION WITHOUT MYELOPATHY OR RADICULOPATHY: ICD-10-CM

## 2022-06-17 PROCEDURE — 72148 MRI LUMBAR SPINE W/O DYE: CPT

## 2022-08-15 ENCOUNTER — OFFICE VISIT (OUTPATIENT)
Dept: SURGERY | Age: 72
End: 2022-08-15
Payer: MEDICARE

## 2022-08-15 VITALS
RESPIRATION RATE: 20 BRPM | OXYGEN SATURATION: 91 % | HEIGHT: 67 IN | WEIGHT: 201 LBS | TEMPERATURE: 98.1 F | HEART RATE: 73 BPM | BODY MASS INDEX: 31.55 KG/M2

## 2022-08-15 DIAGNOSIS — K40.90 RIGHT INGUINAL HERNIA: Primary | ICD-10-CM

## 2022-08-15 PROCEDURE — G8756 NO BP MEASURE DOC: HCPCS | Performed by: SURGERY

## 2022-08-15 PROCEDURE — G8417 CALC BMI ABV UP PARAM F/U: HCPCS | Performed by: SURGERY

## 2022-08-15 PROCEDURE — 1123F ACP DISCUSS/DSCN MKR DOCD: CPT | Performed by: SURGERY

## 2022-08-15 PROCEDURE — G8427 DOCREV CUR MEDS BY ELIG CLIN: HCPCS | Performed by: SURGERY

## 2022-08-15 PROCEDURE — G8432 DEP SCR NOT DOC, RNG: HCPCS | Performed by: SURGERY

## 2022-08-15 PROCEDURE — G9711 PT HX TOT COL OR COLON CA: HCPCS | Performed by: SURGERY

## 2022-08-15 PROCEDURE — 99203 OFFICE O/P NEW LOW 30 MIN: CPT | Performed by: SURGERY

## 2022-08-15 PROCEDURE — 1101F PT FALLS ASSESS-DOCD LE1/YR: CPT | Performed by: SURGERY

## 2022-08-15 PROCEDURE — G8536 NO DOC ELDER MAL SCRN: HCPCS | Performed by: SURGERY

## 2022-08-15 NOTE — PROGRESS NOTES
To: Sridhar Phelps MD, Carlene Valdivia MD    From: Kalyn Kinney MD    Thank you for sending Corin Dwyer to see us. Please note that this dictation was completed with LiveRe, the computer voice recognition software. Quite often unanticipated grammatical, syntax, homophones, and other interpretive errors are inadvertently transcribed by the software. Please disregard these errors. Please excuse any errors that have escaped final proofreading. Encounter Date: 8/15/2022  History and Physical    Assessment:   Symptomatic right inguinal hernia. It actually is not reproducible in the office today but history is quite convincing  Longstanding COPD. He says he has been quite stable for at least 5 years. He no longer sees a pulmonologist.  He says he might be able to walk 100 yards but would be very out of breath and would have to stop at that point. He sees Dr. Indigo Reed annually for a history of CHF but says he has not had any exacerbations since 2016. Chronic back pain on opioids. Body mass index is 31.48 kg/m². S/p bilateral hip replacements, one in 2015 and 1 in 2016. Plan:   While we are not able to reproduce the hernia today in the office, history certainly sounds convincing for right inguinal hernia. He does have significant perioperative risk factors. His COPD would be concerning for general anesthetic, although he says he had no problems with that when he had his hips replaced. I did point out to him however that this was 6 to 7 years ago. We could fix the hernia with conscious sedation but this would require an open repair and given his obesity, there would be increased risk of wound complications. All things considered, however, this may be our best bet. Mesh discussed -- patient consents to its use and acknowledges its risks.       Discussed possibility of incarceration, strangulation, increase in size of the hernia over time, and the risk of emergency surgery in the face of strangulation. Discussed techniques for reducing the hernia should it not reduce spontaneously. Knows to call immediately for incarceration. Also discussed alternatives to and risks and benefits of surgery. Risks include recurrence, bleeding, hematoma, infection, difficulty voiding, chronic nerve pain, and the risks of general anesthetic. The patient expressed understanding of the risks and all questions were answered to the patient's satisfaction. We will see what Dr. Xavier Khan and Dr. Carlos Salazar think of his anesthetic risks and then decide on the best approach. He wants to try to wait until after he gets back from the [facility] at the beginning of October. I told him to call immediately or go to the emergency department if he developed a bulge with pain that could not be reduced. HPI:   Simone Lynn is a 73 y.o. male who is seen in consultation at the request of Romelia Dawkins MD for right inguinal hernia. He tells me he has noticed it for about a month or 2. He says it was out on Friday all day and was fairly miserable. He was able to reduce it by lying down in bed. Presently he says its not bad. He has been able to push it back in every other time. He has severe back pain issues. He has a history of COPD for which she had a stem cell transplant and he says it has remained quite stable ever since. He does tell me that he would be very out of breath if he walked 100 yards. He notes that he has had both hips replaced under general anesthesia and did fine. These were done in 2015 and 2016. Patient has urinary symptoms. Frequent urination. Patient does not have difficulty with bowel movements. Patient does not have nausea or vomiting. Patient does not have history of previous hernia surgery. Patient does not have history of prior abdominal operations. Patient does not have history of prostate disease.       Please see additional history provided in the \"assessment\" section above. Past Medical History:   Diagnosis Date    Anemia 10/13/2017    Arthritis     CAD (coronary artery disease)     Chronic obstructive pulmonary disease (HCC)     Chronic pain     back    Congestive heart failure (HCC)     GERD (gastroesophageal reflux disease)     Hypertension     Hyponatremia 10/13/2017    Ill-defined condition     Gout    Psychiatric disorder     Generalized Anxiety Disorder    Psychiatric disorder     ETOH abuse     Past Surgical History:   Procedure Laterality Date    HX OTHER SURGICAL Left     mastoidectomy and inner ear surgery- age  18    HX OTHER SURGICAL      [facility]    HX TONSILLECTOMY      HX TOTAL COLECTOMY      diverticulitis    IA KNEE SCOPE,MED OR LAT MENIS REPAIR Left       Family History   Problem Relation Age of Onset    Cancer Mother         lung    OSTEOARTHRITIS Mother     Cancer Father         throat    Cancer Brother         prostate      Social History     Tobacco Use    Smoking status: Former     Packs/day: 2.50     Years: 29.00     Pack years: 72.50     Types: Cigarettes     Quit date: 1993     Years since quittin.5    Smokeless tobacco: Never   Substance Use Topics    Alcohol use: No     Alcohol/week: 21.0 standard drinks     Types: 21 Cans of beer per week     Comment: quit march 15 2017 reports was more than 21 beers  per week       Current Outpatient Medications   Medication Sig    carvediloL (COREG) 6.25 mg tablet TAKE 1 TABLET BY MOUTH TWICE A DAY    clotrimazole-betamethasone (LOTRISONE) 1-0.05 % lotion Apply 30 mL to affected area daily. losartan (COZAAR) 50 mg tablet Take 1 Tablet by mouth two (2) times a day. predniSONE (DELTASONE) 10 mg tablet Take 10 mg by mouth daily. pravastatin (PRAVACHOL) 10 mg tablet TAKE 1 TABLET BY MOUTH NIGHTLY    ketoconazole (NIZORAL) 2 % shampoo Apply  to affected area daily as needed for Itching.  Apply to scalp and face up to three times a week as needed for flaky skin    oxyCODONE-acetaminophen (PERCOCET 10)  mg per tablet Take 1 Tab by mouth three (3) times daily as needed for Pain (Back pain). loratadine (CLARITIN) 10 mg tablet Take 10 mg by mouth. diclofenac EC (VOLTAREN) 75 mg EC tablet Take 75 mg by mouth two (2) times a day. allopurinol (ZYLOPRIM) 300 mg tablet Take 300 mg by mouth daily. omeprazole (PRILOSEC) 20 mg capsule Take 20 mg by mouth daily. busPIRone (BUSPAR) 15 mg tablet Take 15 mg by mouth three (3) times daily. citalopram (CELEXA) 40 mg tablet Take 40 mg by mouth daily. budesonide-formoterol (SYMBICORT) 160-4.5 mcg/actuation HFA inhaler Take 2 Puffs by inhalation two (2) times a day. tiotropium (SPIRIVA) 18 mcg inhalation capsule Take 1 Cap by inhalation daily. albuterol (PROVENTIL HFA, VENTOLIN HFA, PROAIR HFA) 90 mcg/actuation inhaler Take 2 Puffs by inhalation daily as needed for Wheezing or Shortness of Breath. No current facility-administered medications for this visit. Allergies: Allergies   Allergen Reactions    Adhesive Tape-Silicones Other (comments)     Pulls skin off    Sulfa (Sulfonamide Antibiotics) Shortness of Breath        Review of Systems:  10 systems reviewed. See scanned sheet in \"Media\" section. See HPI for pertinent positives and negatives. Objective:   Visit Vitals  Pulse 73   Temp 98.1 °F (36.7 °C) (Oral)   Resp 20   Ht 5' 7\" (1.702 m)   Wt 91.2 kg (201 lb)   SpO2 91%   BMI 31.48 kg/m²       Physical Exam:  General appearance  Alert, cooperative, no distress, appears stated age   [de-identified] Anicteric   Neck Supple       Lungs   Clear to auscultation bilaterally   Heart  Regular rate and rhythm. Abdomen   Soft. Bowel sounds normal.  Right inguinal hernia is not reproducible at this visit. Obese.    Extremities no cyanosis or edema   Pulses 2+ right radial   Skin Skin color, texture, turgor normal.   Lymph nodes Inguinal nodes normal.   Neurologic Without overt sensory or motor deficit     Signed By: Dianna Hi MD     August 15, 2022

## 2022-08-15 NOTE — Clinical Note
8/15/2022    Patient: Jonathan Saldaña   YOB: 1950   Date of Visit: 8/15/2022     Usman Reyna MD  [address] [place] [address]  Via Fax: [phone]    Dear Usman Reyna MD,      Thank you for referring Mr. Nena Littlejohn to  [facility]  for evaluation. My notes for this consultation are attached. If you have questions, please do not hesitate to call me. I look forward to following your patient along with you.       Sincerely,    Coty Galdamez MD

## 2022-08-15 NOTE — PROGRESS NOTES
Identified pt with two pt identifiers(name and ). Reviewed record in preparation for visit and have obtained necessary documentation. All patient medications has been reviewed. Chief Complaint   Patient presents with    Inguinal Hernia     Seen at the request of Dr. Diomedes pereira right inguinal hernia        Health Maintenance Due   Topic    Hepatitis C Screening     COVID-19 Vaccine (1)    Pneumococcal 65+ years (1 - PCV)    Shingrix Vaccine Age 50> (1 of 2)    DTaP/Tdap/Td series (1 - Tdap)    AAA Screening 73-69 YO Male Smoking Patients     Lipid Screen     Medicare Yearly Exam        Vitals:    08/15/22 1618   Pulse: 73   Resp: 20   Temp: 98.1 °F (36.7 °C)   TempSrc: Oral   SpO2: 91%   Weight: 91.2 kg (201 lb)   Height: 5' 7\" (1.702 m)   PainSc:   0 - No pain       4. Have you been to the ER, urgent care clinic since your last visit? Hospitalized since your last visit? No    5. Have you seen or consulted any other health care providers outside of the [address] [place] [address] since your last visit? Include any pap smears or colon screening. Yes When: 2022 Dr. Dahlia Alan hernia      Patient is accompanied by self I have received verbal consent from Ori Velasquez to discuss any/all medical information while they are present in the room.

## 2022-08-22 ENCOUNTER — HOSPITAL ENCOUNTER (INPATIENT)
Age: 72
LOS: 4 days | Discharge: HOME | DRG: 872 | End: 2022-08-27
Attending: STUDENT IN AN ORGANIZED HEALTH CARE EDUCATION/TRAINING PROGRAM | Admitting: INTERNAL MEDICINE
Payer: MEDICARE

## 2022-08-22 ENCOUNTER — APPOINTMENT (OUTPATIENT)
Dept: GENERAL RADIOLOGY | Age: 72
DRG: 872 | End: 2022-08-22
Attending: EMERGENCY MEDICINE
Payer: MEDICARE

## 2022-08-22 ENCOUNTER — APPOINTMENT (OUTPATIENT)
Dept: CT IMAGING | Age: 72
DRG: 872 | End: 2022-08-22
Attending: STUDENT IN AN ORGANIZED HEALTH CARE EDUCATION/TRAINING PROGRAM
Payer: MEDICARE

## 2022-08-22 DIAGNOSIS — A41.9 SEPSIS WITH ACUTE RESPIRATORY FAILURE WITHOUT SEPTIC SHOCK, DUE TO UNSPECIFIED ORGANISM, UNSPECIFIED WHETHER HYPOXIA OR HYPERCAPNIA PRESENT (HCC): Primary | ICD-10-CM

## 2022-08-22 DIAGNOSIS — I21.4 NSTEMI (NON-ST ELEVATED MYOCARDIAL INFARCTION) (HCC): ICD-10-CM

## 2022-08-22 DIAGNOSIS — I50.9 CONGESTIVE HEART FAILURE, UNSPECIFIED HF CHRONICITY, UNSPECIFIED HEART FAILURE TYPE (HCC): ICD-10-CM

## 2022-08-22 DIAGNOSIS — J96.00 SEPSIS WITH ACUTE RESPIRATORY FAILURE WITHOUT SEPTIC SHOCK, DUE TO UNSPECIFIED ORGANISM, UNSPECIFIED WHETHER HYPOXIA OR HYPERCAPNIA PRESENT (HCC): Primary | ICD-10-CM

## 2022-08-22 DIAGNOSIS — R65.20 SEPSIS WITH ACUTE RESPIRATORY FAILURE WITHOUT SEPTIC SHOCK, DUE TO UNSPECIFIED ORGANISM, UNSPECIFIED WHETHER HYPOXIA OR HYPERCAPNIA PRESENT (HCC): Primary | ICD-10-CM

## 2022-08-22 DIAGNOSIS — R06.02 SOB (SHORTNESS OF BREATH): ICD-10-CM

## 2022-08-22 LAB
ALBUMIN SERPL-MCNC: 3.5 G/DL (ref 3.5–5)
ALBUMIN/GLOB SERPL: 0.9 {RATIO} (ref 1.1–2.2)
ALP SERPL-CCNC: 69 U/L (ref 45–117)
ALT SERPL-CCNC: 31 U/L (ref 12–78)
ANION GAP SERPL CALC-SCNC: 10 MMOL/L (ref 5–15)
AST SERPL-CCNC: 24 U/L (ref 15–37)
BASOPHILS # BLD: 0 K/UL (ref 0–0.1)
BASOPHILS NFR BLD: 0 % (ref 0–1)
BILIRUB SERPL-MCNC: 1.3 MG/DL (ref 0.2–1)
BNP SERPL-MCNC: 1711 PG/ML
BUN SERPL-MCNC: 16 MG/DL (ref 6–20)
BUN/CREAT SERPL: 24 (ref 12–20)
CALCIUM SERPL-MCNC: 9.2 MG/DL (ref 8.5–10.1)
CHLORIDE SERPL-SCNC: 95 MMOL/L (ref 97–108)
CO2 SERPL-SCNC: 25 MMOL/L (ref 21–32)
COMMENT, HOLDF: NORMAL
CREAT SERPL-MCNC: 0.66 MG/DL (ref 0.7–1.3)
DIFFERENTIAL METHOD BLD: ABNORMAL
EOSINOPHIL # BLD: 0 K/UL (ref 0–0.4)
EOSINOPHIL NFR BLD: 0 % (ref 0–7)
ERYTHROCYTE [DISTWIDTH] IN BLOOD BY AUTOMATED COUNT: 13 % (ref 11.5–14.5)
GLOBULIN SER CALC-MCNC: 3.7 G/DL (ref 2–4)
GLUCOSE SERPL-MCNC: 116 MG/DL (ref 65–100)
HCT VFR BLD AUTO: 39.1 % (ref 36.6–50.3)
HGB BLD-MCNC: 13.3 G/DL (ref 12.1–17)
IMM GRANULOCYTES # BLD AUTO: 0 K/UL (ref 0–0.04)
IMM GRANULOCYTES NFR BLD AUTO: 0 % (ref 0–0.5)
LYMPHOCYTES # BLD: 0.2 K/UL (ref 0.8–3.5)
LYMPHOCYTES NFR BLD: 2 % (ref 12–49)
MCH RBC QN AUTO: 33.7 PG (ref 26–34)
MCHC RBC AUTO-ENTMCNC: 34 G/DL (ref 30–36.5)
MCV RBC AUTO: 99 FL (ref 80–99)
MONOCYTES # BLD: 0.1 K/UL (ref 0–1)
MONOCYTES NFR BLD: 1 % (ref 5–13)
NEUTS SEG # BLD: 10.4 K/UL (ref 1.8–8)
NEUTS SEG NFR BLD: 97 % (ref 32–75)
NRBC # BLD: 0 K/UL (ref 0–0.01)
NRBC BLD-RTO: 0 PER 100 WBC
PLATELET # BLD AUTO: 180 K/UL (ref 150–400)
PMV BLD AUTO: 8.2 FL (ref 8.9–12.9)
POTASSIUM SERPL-SCNC: 3.6 MMOL/L (ref 3.5–5.1)
PROT SERPL-MCNC: 7.2 G/DL (ref 6.4–8.2)
RBC # BLD AUTO: 3.95 M/UL (ref 4.1–5.7)
RBC MORPH BLD: ABNORMAL
SAMPLES BEING HELD,HOLD: NORMAL
SARS-COV-2, COV2: NORMAL
SODIUM SERPL-SCNC: 130 MMOL/L (ref 136–145)
WBC # BLD AUTO: 10.7 K/UL (ref 4.1–11.1)

## 2022-08-22 PROCEDURE — 87804 INFLUENZA ASSAY W/OPTIC: CPT

## 2022-08-22 PROCEDURE — 99285 EMERGENCY DEPT VISIT HI MDM: CPT

## 2022-08-22 PROCEDURE — 87150 DNA/RNA AMPLIFIED PROBE: CPT

## 2022-08-22 PROCEDURE — 87185 SC STD ENZYME DETCJ PER NZM: CPT

## 2022-08-22 PROCEDURE — 71045 X-RAY EXAM CHEST 1 VIEW: CPT

## 2022-08-22 PROCEDURE — 96367 TX/PROPH/DG ADDL SEQ IV INF: CPT

## 2022-08-22 PROCEDURE — 87635 SARS-COV-2 COVID-19 AMP PRB: CPT

## 2022-08-22 PROCEDURE — 85025 COMPLETE CBC W/AUTO DIFF WBC: CPT

## 2022-08-22 PROCEDURE — 83036 HEMOGLOBIN GLYCOSYLATED A1C: CPT

## 2022-08-22 PROCEDURE — 93005 ELECTROCARDIOGRAM TRACING: CPT

## 2022-08-22 PROCEDURE — 94762 N-INVAS EAR/PLS OXIMTRY CONT: CPT

## 2022-08-22 PROCEDURE — 87040 BLOOD CULTURE FOR BACTERIA: CPT

## 2022-08-22 PROCEDURE — 96361 HYDRATE IV INFUSION ADD-ON: CPT

## 2022-08-22 PROCEDURE — 96365 THER/PROPH/DIAG IV INF INIT: CPT

## 2022-08-22 PROCEDURE — 36415 COLL VENOUS BLD VENIPUNCTURE: CPT

## 2022-08-22 PROCEDURE — 83880 ASSAY OF NATRIURETIC PEPTIDE: CPT

## 2022-08-22 PROCEDURE — 87077 CULTURE AEROBIC IDENTIFY: CPT

## 2022-08-22 PROCEDURE — 80053 COMPREHEN METABOLIC PANEL: CPT

## 2022-08-22 PROCEDURE — 96366 THER/PROPH/DIAG IV INF ADDON: CPT

## 2022-08-22 RX ORDER — SODIUM CHLORIDE 0.9 % (FLUSH) 0.9 %
5-40 SYRINGE (ML) INJECTION AS NEEDED
Status: DISCONTINUED | OUTPATIENT
Start: 2022-08-22 | End: 2022-08-27 | Stop reason: HOSPADM

## 2022-08-23 ENCOUNTER — APPOINTMENT (OUTPATIENT)
Dept: NON INVASIVE DIAGNOSTICS | Age: 72
DRG: 872 | End: 2022-08-23
Attending: HOSPITALIST
Payer: MEDICARE

## 2022-08-23 ENCOUNTER — APPOINTMENT (OUTPATIENT)
Dept: ULTRASOUND IMAGING | Age: 72
DRG: 872 | End: 2022-08-23
Attending: HOSPITALIST
Payer: MEDICARE

## 2022-08-23 ENCOUNTER — APPOINTMENT (OUTPATIENT)
Dept: CT IMAGING | Age: 72
DRG: 872 | End: 2022-08-23
Attending: STUDENT IN AN ORGANIZED HEALTH CARE EDUCATION/TRAINING PROGRAM
Payer: MEDICARE

## 2022-08-23 PROBLEM — L03.90 CELLULITIS: Status: ACTIVE | Noted: 2022-08-23

## 2022-08-23 LAB
ACC. NO. FROM MICRO ORDER, ACCP: NORMAL
ACINETOBACTER CALCOACETICUS-BAUMANII COMPLEX, ACBCX: NOT DETECTED
APPEARANCE UR: CLEAR
ATRIAL RATE: 111 BPM
BACTERIA URNS QL MICRO: ABNORMAL /HPF
BACTEROIDES FRAGILIS, BFRA: NOT DETECTED
BASE DEFICIT BLD-SCNC: 1.8 MMOL/L
BILIRUB UR QL: NEGATIVE
BIOFIRE COMMENT, BCIDPF: NORMAL
C GLABRATA DNA VAG QL NAA+PROBE: NOT DETECTED
CA-I BLD-MCNC: 1.14 MMOL/L (ref 1.12–1.32)
CALCULATED R AXIS, ECG10: 55 DEGREES
CALCULATED T AXIS, ECG11: 58 DEGREES
CANDIDA ALBICANS: NOT DETECTED
CANDIDA AURIS, CAAU: NOT DETECTED
CANDIDA KRUSEI, CKRP: NOT DETECTED
CANDIDA PARAPSILOSIS, CPAUP: NOT DETECTED
CANDIDA TROPICALIS, CTROP: NOT DETECTED
CHLORIDE BLD-SCNC: 96 MMOL/L (ref 100–108)
CO2 BLD-SCNC: 25 MMOL/L (ref 19–24)
COLOR UR: ABNORMAL
COVID-19 RAPID TEST, COVR: NOT DETECTED
CREAT UR-MCNC: 1 MG/DL (ref 0.6–1.3)
CRYPTO NEOFORMANS/GATTII, CRYNEG: NOT DETECTED
DIAGNOSIS, 93000: NORMAL
ENTEROBACTER CLOACAE COMPLEX, ECCP: NOT DETECTED
ENTEROBACTERALES SP. , ENBLS: NOT DETECTED
ENTEROCOCCUS FAECALIS, ENFA: NOT DETECTED
ENTEROCOCCUS FAECIUM, ENFAM: NOT DETECTED
EPITH CASTS URNS QL MICRO: ABNORMAL /LPF
ESCHERICHIA COLI: NOT DETECTED
EST. AVERAGE GLUCOSE BLD GHB EST-MCNC: 120 MG/DL
FLUAV AG NPH QL IA: NEGATIVE
FLUBV AG NOSE QL IA: NEGATIVE
GLUCOSE BLD STRIP.AUTO-MCNC: 82 MG/DL (ref 74–106)
GLUCOSE UR STRIP.AUTO-MCNC: NEGATIVE MG/DL
GRAN CASTS URNS QL MICRO: ABNORMAL /LPF
HAEMOPHILUS INFLUENZAE, HMI: NOT DETECTED
HBA1C MFR BLD: 5.8 % (ref 4–5.6)
HCO3 BLDA-SCNC: 25 MMOL/L
HGB UR QL STRIP: NEGATIVE
KETONES UR QL STRIP.AUTO: 40 MG/DL
KLEBSIELLA AEROGENES, KLAE: NOT DETECTED
KLEBSIELLA OXYTOCA: NOT DETECTED
KLEBSIELLA PNEUMONIAE GROUP, KPPG: NOT DETECTED
LACTATE BLD-SCNC: 1.23 MMOL/L (ref 0.4–2)
LEUKOCYTE ESTERASE UR QL STRIP.AUTO: NEGATIVE
LISTERIA MONOCYTOGENES, LMONP: NOT DETECTED
NEISSERIA MENINGITIDIS, NMNI: NOT DETECTED
NITRITE UR QL STRIP.AUTO: NEGATIVE
OTHER,OTHU: ABNORMAL
PCO2 BLDV: 48 MMHG (ref 41–51)
PH BLDV: 7.32 [PH] (ref 7.32–7.42)
PH UR STRIP: 6 [PH] (ref 5–8)
PO2 BLDV: 18 MMHG (ref 25–40)
POTASSIUM BLD-SCNC: 3.7 MMOL/L (ref 3.5–5.5)
PROCALCITONIN SERPL-MCNC: 11.12 NG/ML
PROT UR STRIP-MCNC: 100 MG/DL
PROTEUS, PRP: NOT DETECTED
PSEUDOMONAS AERUGINOSA: NOT DETECTED
Q-T INTERVAL, ECG07: 318 MS
QRS DURATION, ECG06: 96 MS
QTC CALCULATION (BEZET), ECG08: 438 MS
RBC #/AREA URNS HPF: ABNORMAL /HPF (ref 0–5)
RESISTANT GENE SPACE, REGENE: NORMAL
SALMONELLA, SALMO: NOT DETECTED
SERRATIA MARCESCENS: NOT DETECTED
SODIUM BLD-SCNC: 133 MMOL/L (ref 136–145)
SOURCE, COVRS: NORMAL
SP GR UR REFRACTOMETRY: 1.01 (ref 1–1.03)
SPECIMEN SITE: ABNORMAL
STAPH EPIDERMIDIS, STEP: NOT DETECTED
STAPH LUGDUNENSIS, STALUG: NOT DETECTED
STAPHYLOCOCCUS AUREUS: NOT DETECTED
STAPHYLOCOCCUS, STAPP: NOT DETECTED
STENO MALTOPHILIA, STMA: NOT DETECTED
STREPTOCOCCUS , STPSP: NOT DETECTED
STREPTOCOCCUS AGALACTIAE (GROUP B): NOT DETECTED
STREPTOCOCCUS PNEUMONIAE , SPNP: NOT DETECTED
STREPTOCOCCUS PYOGENES (GROUP A), SPYOP: NOT DETECTED
TROPONIN-HIGH SENSITIVITY: 511 NG/L (ref 0–76)
TROPONIN-HIGH SENSITIVITY: 541 NG/L (ref 0–76)
UROBILINOGEN UR QL STRIP.AUTO: 1 EU/DL (ref 0.2–1)
VENTRICULAR RATE, ECG03: 114 BPM
WBC URNS QL MICRO: ABNORMAL /HPF (ref 0–4)

## 2022-08-23 PROCEDURE — 36415 COLL VENOUS BLD VENIPUNCTURE: CPT

## 2022-08-23 PROCEDURE — 93306 TTE W/DOPPLER COMPLETE: CPT

## 2022-08-23 PROCEDURE — 74011000258 HC RX REV CODE- 258: Performed by: INTERNAL MEDICINE

## 2022-08-23 PROCEDURE — 74011636637 HC RX REV CODE- 636/637: Performed by: STUDENT IN AN ORGANIZED HEALTH CARE EDUCATION/TRAINING PROGRAM

## 2022-08-23 PROCEDURE — 82947 ASSAY GLUCOSE BLOOD QUANT: CPT

## 2022-08-23 PROCEDURE — 74011250636 HC RX REV CODE- 250/636: Performed by: STUDENT IN AN ORGANIZED HEALTH CARE EDUCATION/TRAINING PROGRAM

## 2022-08-23 PROCEDURE — 81001 URINALYSIS AUTO W/SCOPE: CPT

## 2022-08-23 PROCEDURE — 94640 AIRWAY INHALATION TREATMENT: CPT

## 2022-08-23 PROCEDURE — 74011250637 HC RX REV CODE- 250/637: Performed by: HOSPITALIST

## 2022-08-23 PROCEDURE — 74011250636 HC RX REV CODE- 250/636: Performed by: INTERNAL MEDICINE

## 2022-08-23 PROCEDURE — 74011000250 HC RX REV CODE- 250: Performed by: HOSPITALIST

## 2022-08-23 PROCEDURE — 93971 EXTREMITY STUDY: CPT

## 2022-08-23 PROCEDURE — 65270000046 HC RM TELEMETRY

## 2022-08-23 PROCEDURE — 84484 ASSAY OF TROPONIN QUANT: CPT

## 2022-08-23 PROCEDURE — A9270 NON-COVERED ITEM OR SERVICE: HCPCS | Performed by: STUDENT IN AN ORGANIZED HEALTH CARE EDUCATION/TRAINING PROGRAM

## 2022-08-23 PROCEDURE — 71275 CT ANGIOGRAPHY CHEST: CPT

## 2022-08-23 PROCEDURE — 84145 PROCALCITONIN (PCT): CPT

## 2022-08-23 PROCEDURE — 74011000258 HC RX REV CODE- 258: Performed by: STUDENT IN AN ORGANIZED HEALTH CARE EDUCATION/TRAINING PROGRAM

## 2022-08-23 PROCEDURE — 74011000636 HC RX REV CODE- 636: Performed by: STUDENT IN AN ORGANIZED HEALTH CARE EDUCATION/TRAINING PROGRAM

## 2022-08-23 RX ORDER — PREDNISONE 20 MG/1
40 TABLET ORAL
Status: DISCONTINUED | OUTPATIENT
Start: 2022-08-23 | End: 2022-08-27 | Stop reason: HOSPADM

## 2022-08-23 RX ORDER — VANCOMYCIN 1.75 GRAM/500 ML IN 0.9 % SODIUM CHLORIDE INTRAVENOUS
1750 ONCE
Status: DISCONTINUED | OUTPATIENT
Start: 2022-08-23 | End: 2022-08-23

## 2022-08-23 RX ORDER — PANTOPRAZOLE SODIUM 40 MG/1
40 TABLET, DELAYED RELEASE ORAL
Status: DISCONTINUED | OUTPATIENT
Start: 2022-08-23 | End: 2022-08-27 | Stop reason: HOSPADM

## 2022-08-23 RX ORDER — VANCOMYCIN 2 GRAM/500 ML IN 0.9 % SODIUM CHLORIDE INTRAVENOUS
2000 ONCE
Status: COMPLETED | OUTPATIENT
Start: 2022-08-23 | End: 2022-08-23

## 2022-08-23 RX ORDER — ALLOPURINOL 100 MG/1
300 TABLET ORAL DAILY
Status: DISCONTINUED | OUTPATIENT
Start: 2022-08-23 | End: 2022-08-27 | Stop reason: HOSPADM

## 2022-08-23 RX ORDER — PRAVASTATIN SODIUM 10 MG/1
10 TABLET ORAL
Status: DISCONTINUED | OUTPATIENT
Start: 2022-08-23 | End: 2022-08-27 | Stop reason: HOSPADM

## 2022-08-23 RX ORDER — PREDNISONE 20 MG/1
40 TABLET ORAL
Status: DISCONTINUED | OUTPATIENT
Start: 2022-08-23 | End: 2022-08-23

## 2022-08-23 RX ORDER — CITALOPRAM 20 MG/1
40 TABLET, FILM COATED ORAL DAILY
Status: DISCONTINUED | OUTPATIENT
Start: 2022-08-23 | End: 2022-08-27 | Stop reason: HOSPADM

## 2022-08-23 RX ORDER — CARVEDILOL 3.12 MG/1
6.25 TABLET ORAL 2 TIMES DAILY
Status: DISCONTINUED | OUTPATIENT
Start: 2022-08-23 | End: 2022-08-27 | Stop reason: HOSPADM

## 2022-08-23 RX ORDER — ALPRAZOLAM 1 MG/1
TABLET ORAL
COMMUNITY
Start: 2022-06-15

## 2022-08-23 RX ORDER — ONDANSETRON 2 MG/ML
4 INJECTION INTRAMUSCULAR; INTRAVENOUS
Status: DISCONTINUED | OUTPATIENT
Start: 2022-08-23 | End: 2022-08-27 | Stop reason: HOSPADM

## 2022-08-23 RX ORDER — VALSARTAN 40 MG/1
80 TABLET ORAL DAILY
Status: DISCONTINUED | OUTPATIENT
Start: 2022-08-23 | End: 2022-08-27 | Stop reason: HOSPADM

## 2022-08-23 RX ORDER — ALPRAZOLAM 0.5 MG/1
0.25 TABLET ORAL
Status: DISCONTINUED | OUTPATIENT
Start: 2022-08-23 | End: 2022-08-27 | Stop reason: HOSPADM

## 2022-08-23 RX ORDER — VANCOMYCIN HYDROCHLORIDE
1250 EVERY 12 HOURS
Status: DISCONTINUED | OUTPATIENT
Start: 2022-08-23 | End: 2022-08-24

## 2022-08-23 RX ORDER — OXYCODONE AND ACETAMINOPHEN 10; 325 MG/1; MG/1
1 TABLET ORAL
Status: DISCONTINUED | OUTPATIENT
Start: 2022-08-23 | End: 2022-08-27 | Stop reason: HOSPADM

## 2022-08-23 RX ORDER — VANCOMYCIN HYDROCHLORIDE
1250 EVERY 12 HOURS
Status: DISCONTINUED | OUTPATIENT
Start: 2022-08-23 | End: 2022-08-23

## 2022-08-23 RX ADMIN — SODIUM CHLORIDE 500 ML: 9 INJECTION, SOLUTION INTRAVENOUS at 00:00

## 2022-08-23 RX ADMIN — ARFORMOTEROL TARTRATE: 15 SOLUTION RESPIRATORY (INHALATION) at 21:01

## 2022-08-23 RX ADMIN — BUSPIRONE HYDROCHLORIDE 15 MG: 5 TABLET ORAL at 10:49

## 2022-08-23 RX ADMIN — ONDANSETRON 4 MG: 2 INJECTION INTRAMUSCULAR; INTRAVENOUS at 09:21

## 2022-08-23 RX ADMIN — CITALOPRAM HYDROBROMIDE 40 MG: 20 TABLET ORAL at 09:22

## 2022-08-23 RX ADMIN — VANCOMYCIN HYDROCHLORIDE 2000 MG: 10 INJECTION, POWDER, LYOPHILIZED, FOR SOLUTION INTRAVENOUS at 01:43

## 2022-08-23 RX ADMIN — PIPERACILLIN AND TAZOBACTAM 3.38 G: 3; .375 INJECTION, POWDER, FOR SOLUTION INTRAVENOUS at 07:37

## 2022-08-23 RX ADMIN — IOPAMIDOL 80 ML: 755 INJECTION, SOLUTION INTRAVENOUS at 00:52

## 2022-08-23 RX ADMIN — ARFORMOTEROL TARTRATE: 15 SOLUTION RESPIRATORY (INHALATION) at 08:49

## 2022-08-23 RX ADMIN — ALLOPURINOL 300 MG: 100 TABLET ORAL at 09:22

## 2022-08-23 RX ADMIN — SODIUM CHLORIDE 1000 ML: 9 INJECTION, SOLUTION INTRAVENOUS at 01:18

## 2022-08-23 RX ADMIN — PANTOPRAZOLE SODIUM 40 MG: 40 TABLET, DELAYED RELEASE ORAL at 07:37

## 2022-08-23 RX ADMIN — DOXYCYCLINE 100 MG: 100 INJECTION, POWDER, LYOPHILIZED, FOR SOLUTION INTRAVENOUS at 13:18

## 2022-08-23 RX ADMIN — OXYCODONE AND ACETAMINOPHEN 1 TABLET: 10; 325 TABLET ORAL at 15:01

## 2022-08-23 RX ADMIN — PREDNISONE 40 MG: 20 TABLET ORAL at 07:37

## 2022-08-23 RX ADMIN — PIPERACILLIN AND TAZOBACTAM 3.38 G: 3; .375 INJECTION, POWDER, FOR SOLUTION INTRAVENOUS at 15:59

## 2022-08-23 RX ADMIN — PIPERACILLIN AND TAZOBACTAM 3.38 G: 3; .375 INJECTION, POWDER, FOR SOLUTION INTRAVENOUS at 01:18

## 2022-08-23 NOTE — ED TRIAGE NOTES
Pt in to ED via EMS from home with report of difficulty breathing that started yesterday and got worse 30 minutes prior to arrival, also reports possible infection to RLE from a cat scratch on 8/20/22, RLE swollen/red/painful

## 2022-08-23 NOTE — PROGRESS NOTES
Pharmacy Automatic Renal Dosing Protocol - Antimicrobials    Indication for Antimicrobials: Sepsis of Unknown Etiology     Current Regimen of Each Antimicrobial:  Vancomycin 2000 mg/VANCOMYCIN 1250 mg every 12 hrs (Start Date ; Day # 1`)  Zosyn 3.375g IV q 8h (start: , day 1)    Goal Level: VANCOMYCIN TROUGH GOAL RANGE    Vancomycin Trough: 15 - 20 mcg/mL  (AUC: 400 - 600 mg/hr/Liter/day)     Date Dose & Interval Measured (mcg/mL) Extrapolated (mcg/mL)                       Date & time of next level: --due to retiming of maintenance dose, not entered at this time    Significant Cultures:  no results yet      Radiology / Imaging results: (X-ray, CT scan or MRI):     Paralysis, amputations, malnutrition:     Labs:  Recent Labs     22  2149   CREA 0.66*   BUN 16   WBC 10.7     Temp (24hrs), Av.7 °F (37.1 °C), Min:98.6 °F (37 °C), Max:98.7 °F (37.1 °C)      Is the Patient on Dialysis? Creatinine Clearance (mL/min):   CrCl (Actual Body Weight): 126.5  CrCl (Adjusted Body Weight): 107.4  CrCl (Ideal Body Weight): 94.7    Impression/Plan:   Sepsis of Unknown Etiology/ Vancomycin 2000 mg/VANCOMYCIN 1250 mg every 12 hrs  Bmp per protocol  Antimicrobial stop date      Pharmacy will follow daily and adjust medications as appropriate for renal function and/or serum levels. Thank you,  JEOVANNY Sandhu    Recommended duration of therapy  [email or web address]/Pharmacy/Clinical%20Companion/[email or web address]. docx    Renal Dosing  [email or web address]/Pharmacy/Clinical%20Companion/Renal%[email or web address]. pdf

## 2022-08-23 NOTE — ED PROVIDER NOTES
EMERGENCY DEPARTMENT HISTORY AND PHYSICAL EXAM      Date: 8/22/2022  Patient Name: Arlene Issa    History of Presenting Illness     Chief Complaint   Patient presents with    Shortness of Breath    Cat scratch     Pt in to ED via EMS from home with report of difficulty breathing that started yesterday and got worse 30 minutes prior to arrival, also reports possible infection to RLE from a cat scratch on 8/20/22, RLE swollen/red/painful       History Provided By: Patient    HPI: Arlene Issa, 73 y.o. male with a past medical history significant for gerd, cad, chf, monoclonal gammopathy presents to the ED with cc of SOB and wound. He notes he became woreningly SOB over the past 24 hours, with associated diaphoresis. Additionally, he got scratched by his cat a few days ago and notes he may have an infection there. He denies pain over the leg where he got scratched but notes it is red. He does have leg swelling but ntoes that is chronic and stable. He denies any CP, nausea, vomitting, ha,, LH. Denies sick exposures. He has not taken anything for his SOB. Notes compliance with his medications. There are no other complaints, changes, or physical findings at this time. PCP: Hitesh Carlson MD    No current facility-administered medications on file prior to encounter. Current Outpatient Medications on File Prior to Encounter   Medication Sig Dispense Refill    ALPRAZolam (XANAX) 1 mg tablet TAKE 1-2 TABS BY MOUTH AS NEEDED 30 MIN PRIOR TO MRI      carvediloL (COREG) 6.25 mg tablet TAKE 1 TABLET BY MOUTH TWICE A  Tablet 3    clotrimazole-betamethasone (LOTRISONE) 1-0.05 % lotion Apply 30 mL to affected area daily. losartan (COZAAR) 50 mg tablet Take 1 Tablet by mouth two (2) times a day. 90 Tablet 3    predniSONE (DELTASONE) 10 mg tablet Take 10 mg by mouth daily.       pravastatin (PRAVACHOL) 10 mg tablet TAKE 1 TABLET BY MOUTH NIGHTLY 90 Tab 3    ketoconazole (NIZORAL) 2 % shampoo Apply  to affected area daily as needed for Itching. Apply to scalp and face up to three times a week as needed for flaky skin      oxyCODONE-acetaminophen (PERCOCET 10)  mg per tablet Take 1 Tab by mouth three (3) times daily as needed for Pain (Back pain). loratadine (CLARITIN) 10 mg tablet Take 10 mg by mouth. diclofenac EC (VOLTAREN) 75 mg EC tablet Take 75 mg by mouth two (2) times a day. allopurinol (ZYLOPRIM) 300 mg tablet Take 300 mg by mouth daily. omeprazole (PRILOSEC) 20 mg capsule Take 20 mg by mouth daily. busPIRone (BUSPAR) 15 mg tablet Take 15 mg by mouth three (3) times daily. citalopram (CELEXA) 40 mg tablet Take 40 mg by mouth daily. budesonide-formoterol (SYMBICORT) 160-4.5 mcg/actuation HFA inhaler Take 2 Puffs by inhalation two (2) times a day. tiotropium (SPIRIVA) 18 mcg inhalation capsule Take 1 Cap by inhalation daily. albuterol (PROVENTIL HFA, VENTOLIN HFA, PROAIR HFA) 90 mcg/actuation inhaler Take 2 Puffs by inhalation daily as needed for Wheezing or Shortness of Breath.          Past History     Past Medical History:  Past Medical History:   Diagnosis Date    Anemia 10/13/2017    Arthritis     CAD (coronary artery disease)     Chronic obstructive pulmonary disease (HCC)     Chronic pain     back    Congestive heart failure (HCC)     GERD (gastroesophageal reflux disease)     Hypertension     Hyponatremia 10/13/2017    Ill-defined condition     Gout    Psychiatric disorder     Generalized Anxiety Disorder    Psychiatric disorder     ETOH abuse       Past Surgical History:  Past Surgical History:   Procedure Laterality Date    HX OTHER SURGICAL Left     mastoidectomy and inner ear surgery- age  18    HX OTHER SURGICAL      investigational stem cell therapy with [facility]    HX TONSILLECTOMY      HX TOTAL COLECTOMY  2000    diverticulitis    MO KNEE SCOPE,MED OR LAT MENIS REPAIR Left        Family History:  Family History Problem Relation Age of Onset    Cancer Mother         lung    OSTEOARTHRITIS Mother     Cancer Father         throat    Cancer Brother         prostate       Social History:  Social History     Tobacco Use    Smoking status: Former     Packs/day: 2.50     Years: 29.00     Pack years: 72.50     Types: Cigarettes     Quit date: 1993     Years since quittin.5    Smokeless tobacco: Never   Vaping Use    Vaping Use: Never used   Substance Use Topics    Alcohol use: No     Alcohol/week: 21.0 standard drinks     Types: 21 Cans of beer per week     Comment: quit march 15 2017 reports was more than 21 beers  per week     Drug use: No       Allergies: Allergies   Allergen Reactions    Adhesive Tape-Silicones Other (comments)     Pulls skin off    Sulfa (Sulfonamide Antibiotics) Shortness of Breath         Review of Systems   Review of Systems   Constitutional:  Positive for chills and fatigue. Negative for fever. HENT:  Negative for congestion. Respiratory:  Positive for shortness of breath. Negative for wheezing. Cardiovascular:  Positive for leg swelling. Negative for chest pain. Gastrointestinal:  Negative for abdominal pain, diarrhea, nausea and vomiting. Genitourinary:  Negative for dysuria. Musculoskeletal:  Negative for back pain. Skin:  Positive for rash and wound. Allergic/Immunologic: Positive for immunocompromised state. Neurological:  Negative for dizziness and headaches. Hematological:  Does not bruise/bleed easily. Physical Exam   Physical Exam  Vitals and nursing note reviewed. Constitutional:       Appearance: He is well-developed. He is diaphoretic. HENT:      Head: Normocephalic and atraumatic. Eyes:      Extraocular Movements: Extraocular movements intact. Pupils: Pupils are equal, round, and reactive to light. Cardiovascular:      Rate and Rhythm: Regular rhythm. Tachycardia present.    Pulmonary:      Effort: Pulmonary effort is normal. Tachypnea present. Breath sounds: Normal breath sounds. No wheezing or rhonchi. Chest:      Chest wall: No mass or deformity. Abdominal:      Palpations: Abdomen is soft. There is no mass. Tenderness: There is no abdominal tenderness. There is no guarding. Musculoskeletal:      Cervical back: Normal range of motion. Comments: 2+ BL LE edema, 3cm healing laceration superfically to lateral right leg   Skin:     Capillary Refill: Capillary refill takes less than 2 seconds. Comments: Scattered macular regions to anterior right shin and around ankle   Neurological:      General: No focal deficit present. Mental Status: He is alert and oriented to person, place, and time. Psychiatric:         Mood and Affect: Mood normal.         Behavior: Behavior normal.       Diagnostic Study Results     Labs -     Recent Results (from the past 24 hour(s))   METABOLIC PANEL, COMPREHENSIVE    Collection Time: 08/22/22  9:49 PM   Result Value Ref Range    Sodium 130 (L) 136 - 145 mmol/L    Potassium 3.6 3.5 - 5.1 mmol/L    Chloride 95 (L) 97 - 108 mmol/L    CO2 25 21 - 32 mmol/L    Anion gap 10 5 - 15 mmol/L    Glucose 116 (H) 65 - 100 mg/dL    BUN 16 6 - 20 MG/DL    Creatinine 0.66 (L) 0.70 - 1.30 MG/DL    BUN/Creatinine ratio 24 (H) 12 - 20      GFR est AA >60 >60 ml/min/1.73m2    GFR est non-AA >60 >60 ml/min/1.73m2    Calcium 9.2 8.5 - 10.1 MG/DL    Bilirubin, total 1.3 (H) 0.2 - 1.0 MG/DL    ALT (SGPT) 31 12 - 78 U/L    AST (SGOT) 24 15 - 37 U/L    Alk.  phosphatase 69 45 - 117 U/L    Protein, total 7.2 6.4 - 8.2 g/dL    Albumin 3.5 3.5 - 5.0 g/dL    Globulin 3.7 2.0 - 4.0 g/dL    A-G Ratio 0.9 (L) 1.1 - 2.2     CBC WITH AUTOMATED DIFF    Collection Time: 08/22/22  9:49 PM   Result Value Ref Range    WBC 10.7 4.1 - 11.1 K/uL    RBC 3.95 (L) 4.10 - 5.70 M/uL    HGB 13.3 12.1 - 17.0 g/dL    HCT 39.1 36.6 - 50.3 %    MCV 99.0 80.0 - 99.0 FL    MCH 33.7 26.0 - 34.0 PG    MCHC 34.0 30.0 - 36.5 g/dL    RDW 13.0 11.5 - 14.5 %    PLATELET 992 308 - 826 K/uL    MPV 8.2 (L) 8.9 - 12.9 FL    NRBC 0.0 0  WBC    ABSOLUTE NRBC 0.00 0.00 - 0.01 K/uL    NEUTROPHILS 97 (H) 32 - 75 %    LYMPHOCYTES 2 (L) 12 - 49 %    MONOCYTES 1 (L) 5 - 13 %    EOSINOPHILS 0 0 - 7 %    BASOPHILS 0 0 - 1 %    IMMATURE GRANULOCYTES 0 0.0 - 0.5 %    ABS. NEUTROPHILS 10.4 (H) 1.8 - 8.0 K/UL    ABS. LYMPHOCYTES 0.2 (L) 0.8 - 3.5 K/UL    ABS. MONOCYTES 0.1 0.0 - 1.0 K/UL    ABS. EOSINOPHILS 0.0 0.0 - 0.4 K/UL    ABS. BASOPHILS 0.0 0.0 - 0.1 K/UL    ABS. IMM. GRANS. 0.0 0.00 - 0.04 K/UL    DF SMEAR SCANNED      RBC COMMENTS MACROCYTOSIS  1+       SAMPLES BEING HELD    Collection Time: 08/22/22  9:49 PM   Result Value Ref Range    SAMPLES BEING HELD RD SST PST BL     COMMENT        Add-on orders for these samples will be processed based on acceptable specimen integrity and analyte stability, which may vary by analyte.    NT-PRO BNP    Collection Time: 08/22/22  9:49 PM   Result Value Ref Range    NT pro-BNP 1,711 (H) <125 PG/ML   SARS-COV-2    Collection Time: 08/22/22 11:28 PM   Result Value Ref Range    SARS-CoV-2 by PCR Please find results under separate order     INFLUENZA A+B VIRAL AGS    Collection Time: 08/22/22 11:28 PM   Result Value Ref Range    Influenza A Antigen Negative NEG      Influenza B Antigen Negative NEG     COVID-19 RAPID TEST    Collection Time: 08/22/22 11:28 PM   Result Value Ref Range    Specimen source Nasopharyngeal      COVID-19 rapid test Not detected NOTD     BLOOD GAS,CHEM8,LACTIC ACID POC    Collection Time: 08/23/22  1:38 AM   Result Value Ref Range    Calcium, ionized (POC) 1.14 1.12 - 1.32 mmol/L    BICARBONATE 25 mmol/L    Base deficit (POC) 1.8 mmol/L    Sample source VENOUS BLOOD      CO2, POC 25 (H) 19 - 24 MMOL/L    Sodium,  (L) 136 - 145 MMOL/L    Potassium, POC 3.7 3.5 - 5.5 MMOL/L    Chloride, POC 96 (L) 100 - 108 MMOL/L    Glucose, POC 82 74 - 106 MG/DL    Creatinine, POC 1.0 0.6 - 1.3 MG/DL    Lactic Acid (POC) 1.23 0.40 - 2.00 mmol/L    pH, venous (POC) 7.32 7.32 - 7.42      pCO2, venous (POC) 48.0 41 - 51 MMHG    pO2, venous (POC) 18 (L) 25 - 40 mmHg   TROPONIN-HIGH SENSITIVITY    Collection Time: 08/23/22  2:25 AM   Result Value Ref Range    Troponin-High Sensitivity 541 (HH) 0 - 76 ng/L       Radiologic Studies -   CTA CHEST W OR W WO CONT   Final Result   No evidence of acute pulmonary embolus. Mild centrilobular emphysema. New   indeterminate 6 mm left upper lobe pulmonary nodule; follow-up chest CT in 6   months is recommended to assure stability or resolution. XR CHEST PORT    (Results Pending)   DUPLEX LOWER EXT VENOUS RIGHT    (Results Pending)     CT Results  (Last 48 hours)                 08/23/22 0052  CTA CHEST W OR W WO CONT Final result    Impression:  No evidence of acute pulmonary embolus. Mild centrilobular emphysema. New   indeterminate 6 mm left upper lobe pulmonary nodule; follow-up chest CT in 6   months is recommended to assure stability or resolution. Narrative:  INDICATION: Shortness of breath and fever       COMPARISON:October 11, 2017       TECHNIQUE:     Routine noncontrast imaging the chest was performed for localization purposes. Then, following the uneventful intravenous administration of 80 cc [id number],   thin helical axial images were obtained through the chest. 3D image   postprocessing was performed. CT dose reduction was achieved through use of a   standardized protocol tailored for this examination and automatic exposure   control for dose modulation. FINDINGS:       CHEST WALL: No chest wall mass or axillary lymphadenopathy. THYROID: No nodule. MEDIASTINUM: No mass or lymphadenopathy. KENAN: No mass or lymphadenopathy. THORACIC AORTA: No dissection or aneurysm. PULMONARY ARTERIES: Main pulmonary artery is normal in caliber. No evidence of   acute pulmonary emboli. TRACHEA/BRONCHI: Patent. ESOPHAGUS: No wall thickening or dilatation.    HEART: Normal in size. Mild coronary artery calcifications. PLEURA: No effusion or pneumothorax. LUNGS: New 6 mm left upper lobe pulmonary nodule (series 2, image 102). Unchanged 3 mm left upper lobe pulmonary nodule (2/91). Mild centrilobular   emphysema. INCIDENTALLY IMAGED UPPER ABDOMEN: No focal abnormality. BONES: No destructive bone lesion. ADDITIONAL COMMENTS: N/A                 CXR Results  (Last 48 hours)      None              Medical Decision Making   I am the first provider for this patient. I reviewed the vital signs, available nursing notes, past medical history, past surgical history, family history and social history. Vital Signs-Reviewed the patient's vital signs.   Patient Vitals for the past 12 hrs:   Temp Pulse Resp BP SpO2   08/23/22 0358 -- 83 19 111/75 100 %   08/23/22 0328 -- 82 20 98/69 97 %   08/23/22 0315 -- 81 19 102/68 97 %   08/23/22 0258 -- 80 20 103/75 93 %   08/23/22 0236 -- 89 27 (!) 111/97 98 %   08/23/22 0221 -- 82 20 107/72 96 %   08/23/22 0206 -- 79 20 96/85 98 %   08/23/22 0151 -- 78 17 103/77 97 %   08/23/22 0136 -- 81 18 91/69 95 %   08/23/22 0121 -- 84 13 99/60 97 %   08/23/22 0051 -- -- -- (!) 74/55 --   08/23/22 0036 -- 89 17 (!) 81/64 94 %   08/23/22 0006 -- (!) 103 12 (!) 85/69 95 %   08/22/22 2357 98.7 °F (37.1 °C) 70 20 (!) 81/66 --   08/22/22 2323 -- (!) 113 10 (!) 85/70 94 %   08/22/22 2308 -- (!) 117 13 99/77 94 %   08/22/22 2253 -- (!) 114 19 96/82 94 %   08/22/22 2238 -- (!) 112 16 122/85 96 %   08/22/22 2223 -- (!) 115 16 (!) 160/96 100 %   08/22/22 2208 -- (!) 112 18 (!) 142/89 97 %   08/22/22 2153 -- (!) 109 17 (!) 150/90 93 %   08/22/22 2144 -- -- -- -- 96 %   08/22/22 2136 98.6 °F (37 °C) (!) 114 20 122/85 96 %       Records Reviewed: Nursing records and medical records reviewed    MDM:  DDx includes ACS, MI, pulmonary edema, pneumonia, gastroenteritis, cellulitis, bacteremia, cat scratch fever, UTI    Provider Notes (Medical Decision Making): 70-year-old male with history of CHF, hypertension, CAD, COPD presents with shortness of breath. He is notably diaphoretic but his only complaint is shortness of breath. He denies any chest pain. He does have a wound on his right lateral leg from his cat. He does not have any adenopathy to suggest Bartonella but other feline bacteria are concerning for possible bacteremia from that source. He is tachycardic which draws attention to possible PE. Obtained a CTA which was negative for pneumonia or any pulmonary emboli. His lab work had a normal white blood cell count. His vital signs on arrival showed tachycardia and a normal blood pressure. Shortly after arrival his blood pressure began to decrease even after a gentle 500 cc bolus was given in spite of his CHF history. Given his diaphoresis and worsening hypotension there is a high suspicion for a sepsis picture developing. As such a sepsis alert was called and more aggressive fluid resuscitation was begun while also keeping in mind his heart failure. He remains on room air and his oxygen saturation was stable. He was very fluid responsive and after 1.5 L of normal saline his blood pressure normalized and his tachycardia has resolved into the 80s. Still unclear source of his sepsis though will need blood cultures to culture and be read. He is COVID and flu negative. Still awaiting urinalysis. Will admit to hospitalist for following of cultures and continue antibiotic therapy. ED Course:   Initial assessment performed. The patients presenting problems have been discussed, and they are in agreement with the care plan formulated and outlined with them. I have encouraged them to ask questions as they arise throughout their visit.     ED Course as of 08/23/22 0511   e Aug 23, 2022   0111 [facility] ED SEPSIS NOTE:     1:11 AM The patient now meets criteria for: Severe Sepsis    Fluid resuscitation with: 30 mL/kg crystalloid bolus  Due to concern for rapidly advancing infection and deterioration of patient's condition, antibiotics are started STAT and cultures ordered. [JS]   2113 Of note patient was not given a full 30 cc/kg bolus given his history of heart failure. Reassessed the patient after giving 1.5 cc of fluids he has no complaints. Only some mild shortness of breath. He continues to deny any chest pain. Troponin is notably elevated at 541 but again no chest pain and EKG which shows tachycardia to 114 and a QTC of 438 and QRS of 96. He has no ST elevation he has a normal axis and no arrhythmias. This is likely demand in the setting of his sepsis. We will continue to trend though. His BNP is elevated at 1700 which can be expected in the setting of heart failure as well. He currently has good perfusion no mottling of the skin and good distal pulses.  [JS]      ED Course User Index  [JS] Patti Hoffman MD       Medications Administered       iopamidoL (ISOVUE-370) 76 % injection 100 mL       Admin Date  08/23/2022 Action  Given Dose  80 mL Route  IntraVENous Administered By  Madelyn Borrego              piperacillin-tazobactam (ZOSYN) 3.375 g in 0.9% sodium chloride (MBP/ADV) 100 mL MBP       Admin Date  08/23/2022 Action  New Bag Dose  3.375 g Rate  200 mL/hr Route  IntraVENous Administered By  Clemente Solis RN              sodium chloride 0.9 % bolus infusion 1,000 mL       Admin Date  08/23/2022 Action  New Bag Dose  1,000 mL Rate  1,000 mL/hr Route  IntraVENous Administered By  Clemente Solis RN              sodium chloride 0.9 % bolus infusion 500 mL       Admin Date  08/23/2022 Action  New Bag Dose  500 mL Rate  500 mL/hr Route  IntraVENous Administered By  Clemente Solis RN              vancomycin (VANCOCIN) 2000 mg in  ml infusion       Admin Date  08/23/2022 Action  New Bag Dose  2,000 mg Rate  250 mL/hr Route  IntraVENous Administered By  Clemente Solis RN                        Critical Care:  I have spent 34 minutes of critical care time in evaluating and treating this patient. This includes time spent at bedside, time with family and decision makers, documentation, review of labs and imaging, and/or consultation with specialists. It does not include time spent on separately billed procedures. This patient presents with a critical illness or injury that acutely impairs one or more vital organ systems such that there is a high probability of imminent or life threatening deterioration in the patient's condition. This case involved decision making of high complexity to assess, manipulate, and support vital organ system failure and/or to prevent further life threatening deterioration of the patient's condition. Failure to initiate these interventions on an urgent basis would likely result in sudden, clinically significant or life threatening deterioration in the patient's condition. Abnormal findings supporting critical care: elevated troponin, hypotension  Interventions to support critical care: fluid resuscitation, antibiotics, lab analysis, consultation with hospitalist  Failure to intervene may result in: septic shock, hypotension, cardiac arrest, death       Disposition:    1. Admit to Hospitalist    Admission Note:  5:11 AM  Patient is being admitted to the hospital by dr. Candace Harding. The results of their tests and reasons for their admission have been discussed with them and available family. They convey agreement and understanding for the need to be admitted and for their admission diagnosis. DISCHARGE PLAN:  1. Current Discharge Medication List        2. Follow-up Information    None       3. Return to ED if worse     Diagnosis     Clinical Impression:   1. Sepsis with acute respiratory failure without septic shock, due to unspecified organism, unspecified whether hypoxia or hypercapnia present ([address]    2. SOB (shortness of breath)    3. NSTEMI (non-ST elevated myocardial infarction) ([address]    4. Congestive heart failure, unspecified HF chronicity, unspecified heart failure type [facility])        Attestations:    Jimmie Aviles MD    Please note that this dictation was completed with [employer], the computer voice recognition software. Quite often unanticipated grammatical, syntax, homophones, and other interpretive errors are inadvertently transcribed by the computer software. Please disregard these errors. Please excuse any errors that have escaped final proofreading. Thank you.

## 2022-08-23 NOTE — H&P
Hospitalist Admission Note    NAME: Anand Patel   :  1950   MRN:  [id number]     Date/Time:  2022 2:26 AM    Patient PCP: Dona Palma MD  _____________________________________________________________________  Given the patient's current clinical presentation, I have a high level of concern for decompensation if discharged from the emergency department. Complex decision making was performed, which includes reviewing the patient's available past medical records, laboratory results, and x-ray films. My assessment of this patient's clinical condition and my plan of care is as follows. Assessment / Plan:  SOB   Hypotension  Right leg cellulitis  Hx of NICM   Hx of COPD on chronic prednisone 10mg daily. Not on home oxygen  [id number]  Hyponatremia 130      Admit to TELE  IV ABX  Vanc/zosyn  HOLD Further IVF as pt. Has h/o CHF  Will give IVF PRN  Check serial trop and 2d echo  Check procalcitonin level  ua   Follow blood cx  Ordered right leg venous duplex  Bronchiodilator theraphy for possible copd flare  Pt. Will need repeat CT Chest in 6 months for  new 6mm left upper lobe pulmn nodule. Discussed with pt. And CT report was give. Pt. Understood and verbailized the understanding    Holding  Home bp meds. 2/ soft BP      CTA CHEST W OR W WO CONT:  No evidence of acute pulmonary embolus. Mild centrilobular emphysema. New indeterminate 6 mm left upper lobe pulmonary nodule; follow-up chest CT in 6 months is recommended to assure stability or resolution. H/o NICM Ech done 2020 with preserved LVEF 55-60% without significant valvular pathology     2D ECHO 2020  Normal cavity size, wall thickness and systolic function (ejection fraction normal). Estimated left ventricular ejection fraction is 55 - 60%. No regional wall motion abnormality noted. Age-appropriate left ventricular diastolic function. Mildly dilated right ventricle. Mild aortic valve sclerosis.     H/O Generalized anxiety disorder Resume home meds    Code Status: Full  Surrogate Decision Maker:    DVT Prophylaxis:   GI Prophylaxis: not indicated    Baseline: independent        Subjective:   CHIEF COMPLAINT:  SOB  and Right leg pain/swelling    HISTORY OF PRESENT ILLNESS:     Leonora Bear is a 73 y.o.   male who presents with  sob and right leg pain. Pt CAME in to ED via EMS from home with report of difficulty breathing that started yesterday and got worse 30 minutes prior to arrival, also reports possible infection to RLE from a cat scratch on 8/20/22, RLE swollen/red/painful. In ED initially was normotensive but the became Hypotensive which responded to IVF. Pt. was also given abx sepsis  2/2 right leg cellulitis. He has a PMHx of non-ischemic cardiomyopathy, non-obstructive CAD, HTN, HLD and COPD. Longstanding COPD. He says he has been quite stable for at least 5 years. He no longer sees a pulmonologist.He sees Dr. Loli Neumann annually for a history of CHF but says he has not had any exacerbations since 2016. Chronic back pain on opioids. We were asked to admit for work up and evaluation of the above problems. Old  record  review  Admit date: 10/11/2017  Discharge date and time: 10/13/2017    DISCHARGE DIAGNOSIS:     Acute on chronic respiratory failure with hypoxia, POA  Acute pulmonary edema with new onset chf, POA  Non Ischemic Cardiomyopathy  S/P Cardiac Cath EF is 25-30%  Accelerated hypertension,  SIRS due to above, meet severe sepsis criteria  COPD with chronic respiratory failure on 2L O2 at night only at baseline. Generalized anxiety disorder  S/p left LELO on 9/21/17.     Chronic marijuana abuse and ETOH USE     Past Medical History:   Diagnosis Date    Anemia 10/13/2017    Arthritis     CAD (coronary artery disease)     Chronic obstructive pulmonary disease (HCC)     Chronic pain     back    Congestive heart failure (HCC)     GERD (gastroesophageal reflux disease)     Hypertension     Hyponatremia 10/13/2017 Ill-defined condition     Gout    Psychiatric disorder     Generalized Anxiety Disorder    Psychiatric disorder     ETOH abuse        Past Surgical History:   Procedure Laterality Date    HX OTHER SURGICAL Left     mastoidectomy and inner ear surgery- age  18    HX OTHER SURGICAL      investigational stem cell therapy with [facility]d    HX TONSILLECTOMY      HX TOTAL COLECTOMY      diverticulitis    HI KNEE SCOPE,MED OR LAT MENIS REPAIR Left        Social History     Tobacco Use    Smoking status: Former     Packs/day: 2.50     Years: 29.00     Pack years: 72.50     Types: Cigarettes     Quit date: 1993     Years since quittin.5    Smokeless tobacco: Never   Substance Use Topics    Alcohol use: No     Alcohol/week: 21.0 standard drinks     Types: 21 Cans of beer per week     Comment: quit march 15 2017 reports was more than 21 beers  per week         Family History   Problem Relation Age of Onset    Cancer Mother         lung    OSTEOARTHRITIS Mother     Cancer Father         throat    Cancer Brother         prostate     Allergies   Allergen Reactions    Adhesive Tape-Silicones Other (comments)     Pulls skin off    Sulfa (Sulfonamide Antibiotics) Shortness of Breath        Prior to Admission medications    Medication Sig Start Date End Date Taking? Authorizing Provider   carvediloL (COREG) 6.25 mg tablet TAKE 1 TABLET BY MOUTH TWICE A DAY 22   Les Mast NP   clotrimazole-betamethasone (LOTRISONE) 1-0.05 % lotion Apply 30 mL to affected area daily. 21   Provider, Historical   losartan (COZAAR) 50 mg tablet Take 1 Tablet by mouth two (2) times a day. 3/9/22   Dariel Snyder NP   predniSONE (DELTASONE) 10 mg tablet Take 10 mg by mouth daily. 20   Provider, Historical   pravastatin (PRAVACHOL) 10 mg tablet TAKE 1 TABLET BY MOUTH NIGHTLY 18   Troy Maharaj NP   ketoconazole (NIZORAL) 2 % shampoo Apply  to affected area daily as needed for Itching.  Apply to scalp and face up to three times a week as needed for flaky skin    Provider, Historical   oxyCODONE-acetaminophen (PERCOCET 10)  mg per tablet Take 1 Tab by mouth three (3) times daily as needed for Pain (Back pain). Provider, Historical   loratadine (CLARITIN) 10 mg tablet Take 10 mg by mouth. Provider, Historical   diclofenac EC (VOLTAREN) 75 mg EC tablet Take 75 mg by mouth two (2) times a day. 9/21/17   Xiomara Jansen NP   allopurinol (ZYLOPRIM) 300 mg tablet Take 300 mg by mouth daily. Provider, Historical   omeprazole (PRILOSEC) 20 mg capsule Take 20 mg by mouth daily. Provider, Historical   busPIRone (BUSPAR) 15 mg tablet Take 15 mg by mouth three (3) times daily. Provider, Historical   citalopram (CELEXA) 40 mg tablet Take 40 mg by mouth daily. Provider, Historical   budesonide-formoterol (SYMBICORT) 160-4.5 mcg/actuation HFA inhaler Take 2 Puffs by inhalation two (2) times a day. Provider, Historical   tiotropium (SPIRIVA) 18 mcg inhalation capsule Take 1 Cap by inhalation daily. Provider, Historical   albuterol (PROVENTIL HFA, VENTOLIN HFA, PROAIR HFA) 90 mcg/actuation inhaler Take 2 Puffs by inhalation daily as needed for Wheezing or Shortness of Breath. Provider, Historical       REVIEW OF SYSTEMS:     I am not able to complete the review of systems because:    The patient is intubated and sedated    The patient has altered mental status due to his acute medical problems    The patient has baseline aphasia from prior stroke(s)    The patient has baseline dementia and is not reliable historian    The patient is in acute medical distress and unable to provide information           Total of 12 systems reviewed as follows:       POSITIVE= underlined text  Negative = text not underlined  General:  fever, chills, sweats, generalized weakness, weight loss/gain,      loss of appetite   Eyes:    blurred vision, eye pain, loss of vision, double vision  ENT:    rhinorrhea, pharyngitis   Respiratory:   cough, sputum production, SOB, COWART, wheezing, pleuritic pain   Cardiology:   chest pain, palpitations, orthopnea, PND, edema, syncope   Gastrointestinal:  abdominal pain , N/V, diarrhea, dysphagia, constipation, bleeding   Genitourinary:  frequency, urgency, dysuria, hematuria, incontinence   Muskuloskeletal :  arthralgia, myalgia, back pain  Hematology:  easy bruising, nose or gum bleeding, lymphadenopathy   Dermatological: rash, ulceration, pruritis, color change / jaundice  Endocrine:   hot flashes or polydipsia   Neurological:  headache, dizziness, confusion, focal weakness, paresthesia,     Speech difficulties, memory loss, gait difficulty  Psychological: Feelings of anxiety, depression, agitation    Objective:   VITALS:    Visit Vitals  BP (!) 81/66 (BP 1 Location: Right upper arm, BP Patient Position: At rest)   Pulse 70   Temp 98.7 °F (37.1 °C)   Resp 20   Ht 5' 7.5\" (1.715 m)   Wt 89.8 kg (198 lb)   SpO2 94%   BMI 30.55 kg/m²       PHYSICAL EXAM:    General:    Alert, cooperative, no distress, appears stated age. HEENT: Atraumatic, anicteric sclerae, pink conjunctivae     No oral ulcers, mucosa moist, throat clear, dentition fair  Neck:  Supple, symmetrical,  thyroid: non tender  Lungs:   Clear to auscultation bilaterally. No Wheezing or Rhonchi. No rales. Chest wall:  No tenderness  No Accessory muscle use. Heart:   Regular  rhythm,  No  murmur   No edema  Abdomen:   Soft, non-tender. Not distended. Bowel sounds normal  Extremities: No cyanosis. No clubbing,  Positive right leg cellulitis    Skin turgor normal, Capillary refill normal, Radial dial pulse 2+  Skin:     Not pale. Not Jaundiced  No rashes   Psych:  Good insight. Not depressed. Not anxious or agitated. Neurologic: EOMs intact. No facial asymmetry. No aphasia or slurred speech. Symmetrical strength, Sensation grossly intact. Alert and oriented X 4. _______________________________________________________________________  Care Plan discussed with:    Comments   Patient Y    Family      RN Y    Care Manager                    Consultant:  Y ED MD   _______________________________________________________________________  Expected  Disposition:   Home with Family TBD   HH/PT/OT/RN    SNF/LTC    MARIANNE    ________________________________________________________________________  TOTAL TIME:   54  Minutes    Critical Care Provided     Minutes non procedure based      Comments    X Reviewed previous records   >50% of visit spent in counseling and coordination of care X Discussion with patient and/or family and questions answered       Given the patient's current clinical presentation, I have a high level of concern for decompensation if discharged from the ED. Complex decision making was performed which includes reviewing the patient's available past medical records, laboratory results, and Xray films. I have also directly communicated my plan and discussed this case with the involved ED physician.     ____________________________________________________________________  Aicha Foster MD    Procedures: see electronic medical records for all procedures/Xrays and details which were not copied into this note but were reviewed prior to creation of Plan.     LAB DATA REVIEWED:    Recent Results (from the past 24 hour(s))   METABOLIC PANEL, COMPREHENSIVE    Collection Time: 08/22/22  9:49 PM   Result Value Ref Range    Sodium 130 (L) 136 - 145 mmol/L    Potassium 3.6 3.5 - 5.1 mmol/L    Chloride 95 (L) 97 - 108 mmol/L    CO2 25 21 - 32 mmol/L    Anion gap 10 5 - 15 mmol/L    Glucose 116 (H) 65 - 100 mg/dL    BUN 16 6 - 20 MG/DL    Creatinine 0.66 (L) 0.70 - 1.30 MG/DL    BUN/Creatinine ratio 24 (H) 12 - 20      GFR est AA >60 >60 ml/min/1.73m2    GFR est non-AA >60 >60 ml/min/1.73m2    Calcium 9.2 8.5 - 10.1 MG/DL    Bilirubin, total 1.3 (H) 0.2 - 1.0 MG/DL    ALT (SGPT) 31 12 - 78 U/L    AST (SGOT) 24 15 - 37 U/L    Alk. phosphatase 69 45 - 117 U/L    Protein, total 7.2 6.4 - 8.2 g/dL    Albumin 3.5 3.5 - 5.0 g/dL    Globulin 3.7 2.0 - 4.0 g/dL    A-G Ratio 0.9 (L) 1.1 - 2.2     CBC WITH AUTOMATED DIFF    Collection Time: 08/22/22  9:49 PM   Result Value Ref Range    WBC 10.7 4.1 - 11.1 K/uL    RBC 3.95 (L) 4.10 - 5.70 M/uL    HGB 13.3 12.1 - 17.0 g/dL    HCT 39.1 36.6 - 50.3 %    MCV 99.0 80.0 - 99.0 FL    MCH 33.7 26.0 - 34.0 PG    MCHC 34.0 30.0 - 36.5 g/dL    RDW 13.0 11.5 - 14.5 %    PLATELET 956 381 - 016 K/uL    MPV 8.2 (L) 8.9 - 12.9 FL    NRBC 0.0 0  WBC    ABSOLUTE NRBC 0.00 0.00 - 0.01 K/uL    NEUTROPHILS 97 (H) 32 - 75 %    LYMPHOCYTES 2 (L) 12 - 49 %    MONOCYTES 1 (L) 5 - 13 %    EOSINOPHILS 0 0 - 7 %    BASOPHILS 0 0 - 1 %    IMMATURE GRANULOCYTES 0 0.0 - 0.5 %    ABS. NEUTROPHILS 10.4 (H) 1.8 - 8.0 K/UL    ABS. LYMPHOCYTES 0.2 (L) 0.8 - 3.5 K/UL    ABS. MONOCYTES 0.1 0.0 - 1.0 K/UL    ABS. EOSINOPHILS 0.0 0.0 - 0.4 K/UL    ABS. BASOPHILS 0.0 0.0 - 0.1 K/UL    ABS. IMM. GRANS. 0.0 0.00 - 0.04 K/UL    DF SMEAR SCANNED      RBC COMMENTS MACROCYTOSIS  1+       SAMPLES BEING HELD    Collection Time: 08/22/22  9:49 PM   Result Value Ref Range    SAMPLES BEING HELD RD SST PST BL     COMMENT        Add-on orders for these samples will be processed based on acceptable specimen integrity and analyte stability, which may vary by analyte.    NT-PRO BNP    Collection Time: 08/22/22  9:49 PM   Result Value Ref Range    NT pro-BNP 1,711 (H) <125 PG/ML   SARS-COV-2    Collection Time: 08/22/22 11:28 PM   Result Value Ref Range    SARS-CoV-2 by PCR Please find results under separate order     INFLUENZA A+B VIRAL AGS    Collection Time: 08/22/22 11:28 PM   Result Value Ref Range    Influenza A Antigen Negative NEG      Influenza B Antigen Negative NEG     COVID-19 RAPID TEST    Collection Time: 08/22/22 11:28 PM   Result Value Ref Range    Specimen source Nasopharyngeal COVID-19 rapid test Not detected NOTD     BLOOD GAS,CHEM8,LACTIC ACID POC    Collection Time: 08/23/22  1:38 AM   Result Value Ref Range    Calcium, ionized (POC) 1.14 1.12 - 1.32 mmol/L    BICARBONATE 25 mmol/L    Base deficit (POC) 1.8 mmol/L    Sample source VENOUS BLOOD      CO2, POC 25 (H) 19 - 24 MMOL/L    Sodium,  (L) 136 - 145 MMOL/L    Potassium, POC 3.7 3.5 - 5.5 MMOL/L    Chloride, POC 96 (L) 100 - 108 MMOL/L    Glucose, POC 82 74 - 106 MG/DL    Creatinine, POC 1.0 0.6 - 1.3 MG/DL    Lactic Acid (POC) 1.23 0.40 - 2.00 mmol/L    pH, venous (POC) 7.32 7.32 - 7.42      pCO2, venous (POC) 48.0 41 - 51 MMHG    pO2, venous (POC) 18 (L) 25 - 40 mmHg

## 2022-08-23 NOTE — PROGRESS NOTES
Patient seen and examined. 73year old male with a history of CAD, CHF, COPD  Found to have cellulitis, on broad spectrum abx  Follow cultures, procalcitonin elevated 11.12  Hold IVF for hx of HF, cont.  Bronchodilators  DVT USG -ve     Non billable rounding

## 2022-08-24 LAB
ANION GAP SERPL CALC-SCNC: 11 MMOL/L (ref 5–15)
BUN SERPL-MCNC: 25 MG/DL (ref 6–20)
BUN/CREAT SERPL: 42 (ref 12–20)
CALCIUM SERPL-MCNC: 8.7 MG/DL (ref 8.5–10.1)
CHLORIDE SERPL-SCNC: 99 MMOL/L (ref 97–108)
CO2 SERPL-SCNC: 23 MMOL/L (ref 21–32)
CREAT SERPL-MCNC: 0.6 MG/DL (ref 0.7–1.3)
ECHO AO ASC DIAM: 3 CM
ECHO AO ASCENDING AORTA INDEX: 1.49 CM/M2
ECHO AO ROOT DIAM: 3.4 CM
ECHO AO ROOT INDEX: 1.69 CM/M2
ECHO AV AREA PEAK VELOCITY: 4.2 CM2
ECHO AV AREA VTI: 4.4 CM2
ECHO AV AREA/BSA PEAK VELOCITY: 2.1 CM2/M2
ECHO AV AREA/BSA VTI: 2.2 CM2/M2
ECHO AV MEAN GRADIENT: 5 MMHG
ECHO AV MEAN VELOCITY: 1.1 M/S
ECHO AV PEAK GRADIENT: 9 MMHG
ECHO AV PEAK VELOCITY: 1.5 M/S
ECHO AV VELOCITY RATIO: 0.87
ECHO AV VTI: 29.5 CM
ECHO LA DIAMETER INDEX: 1.64 CM/M2
ECHO LA DIAMETER: 3.3 CM
ECHO LA TO AORTIC ROOT RATIO: 0.97
ECHO LV FRACTIONAL SHORTENING: 18 % (ref 28–44)
ECHO LV INTERNAL DIMENSION DIASTOLE INDEX: 2.54 CM/M2
ECHO LV INTERNAL DIMENSION DIASTOLIC: 5.1 CM (ref 4.2–5.9)
ECHO LV INTERNAL DIMENSION SYSTOLIC INDEX: 2.09 CM/M2
ECHO LV INTERNAL DIMENSION SYSTOLIC: 4.2 CM
ECHO LV IVSD: 0.9 CM (ref 0.6–1)
ECHO LV MASS 2D: 188 G (ref 88–224)
ECHO LV MASS INDEX 2D: 93.5 G/M2 (ref 49–115)
ECHO LV POSTERIOR WALL DIASTOLIC: 1.1 CM (ref 0.6–1)
ECHO LV RELATIVE WALL THICKNESS RATIO: 0.43
ECHO LVOT AREA: 4.9 CM2
ECHO LVOT AV VTI INDEX: 0.91
ECHO LVOT DIAM: 2.5 CM
ECHO LVOT MEAN GRADIENT: 3 MMHG
ECHO LVOT PEAK GRADIENT: 7 MMHG
ECHO LVOT PEAK VELOCITY: 1.3 M/S
ECHO LVOT STROKE VOLUME INDEX: 65.4 ML/M2
ECHO LVOT SV: 131.5 ML
ECHO LVOT VTI: 26.8 CM
ECHO MV A VELOCITY: 1.19 M/S
ECHO MV AREA VTI: 6 CM2
ECHO MV E DECELERATION TIME (DT): 54.4 MS
ECHO MV E VELOCITY: 0.69 M/S
ECHO MV E/A RATIO: 0.58
ECHO MV LVOT VTI INDEX: 0.82
ECHO MV MAX VELOCITY: 1.4 M/S
ECHO MV MEAN GRADIENT: 4 MMHG
ECHO MV MEAN VELOCITY: 0.9 M/S
ECHO MV PEAK GRADIENT: 8 MMHG
ECHO MV VTI: 22 CM
ECHO RV TAPSE: 2.9 CM (ref 1.7–?)
ECHO TV REGURGITANT MAX VELOCITY: 2.89 M/S
ECHO TV REGURGITANT PEAK GRADIENT: 34 MMHG
ERYTHROCYTE [DISTWIDTH] IN BLOOD BY AUTOMATED COUNT: 13 % (ref 11.5–14.5)
GLUCOSE SERPL-MCNC: 136 MG/DL (ref 65–100)
HCT VFR BLD AUTO: 33.5 % (ref 36.6–50.3)
HGB BLD-MCNC: 11.5 G/DL (ref 12.1–17)
MCH RBC QN AUTO: 33.9 PG (ref 26–34)
MCHC RBC AUTO-ENTMCNC: 34.3 G/DL (ref 30–36.5)
MCV RBC AUTO: 98.8 FL (ref 80–99)
NRBC # BLD: 0 K/UL (ref 0–0.01)
NRBC BLD-RTO: 0 PER 100 WBC
PLATELET # BLD AUTO: 210 K/UL (ref 150–400)
PMV BLD AUTO: 9 FL (ref 8.9–12.9)
POTASSIUM SERPL-SCNC: 3.3 MMOL/L (ref 3.5–5.1)
RBC # BLD AUTO: 3.39 M/UL (ref 4.1–5.7)
SODIUM SERPL-SCNC: 133 MMOL/L (ref 136–145)
WBC # BLD AUTO: 16.8 K/UL (ref 4.1–11.1)

## 2022-08-24 PROCEDURE — 74011000250 HC RX REV CODE- 250: Performed by: HOSPITALIST

## 2022-08-24 PROCEDURE — 74011250636 HC RX REV CODE- 250/636: Performed by: INTERNAL MEDICINE

## 2022-08-24 PROCEDURE — 65270000046 HC RM TELEMETRY

## 2022-08-24 PROCEDURE — 74011000258 HC RX REV CODE- 258: Performed by: INTERNAL MEDICINE

## 2022-08-24 PROCEDURE — 74011000250 HC RX REV CODE- 250: Performed by: STUDENT IN AN ORGANIZED HEALTH CARE EDUCATION/TRAINING PROGRAM

## 2022-08-24 PROCEDURE — 80048 BASIC METABOLIC PNL TOTAL CA: CPT

## 2022-08-24 PROCEDURE — 94640 AIRWAY INHALATION TREATMENT: CPT

## 2022-08-24 PROCEDURE — 74011250637 HC RX REV CODE- 250/637: Performed by: HOSPITALIST

## 2022-08-24 PROCEDURE — 85027 COMPLETE CBC AUTOMATED: CPT

## 2022-08-24 PROCEDURE — 74011636637 HC RX REV CODE- 636/637: Performed by: STUDENT IN AN ORGANIZED HEALTH CARE EDUCATION/TRAINING PROGRAM

## 2022-08-24 PROCEDURE — 93306 TTE W/DOPPLER COMPLETE: CPT | Performed by: INTERNAL MEDICINE

## 2022-08-24 PROCEDURE — 36415 COLL VENOUS BLD VENIPUNCTURE: CPT

## 2022-08-24 RX ADMIN — OXYCODONE AND ACETAMINOPHEN 1 TABLET: 10; 325 TABLET ORAL at 17:11

## 2022-08-24 RX ADMIN — PIPERACILLIN AND TAZOBACTAM 3.38 G: 3; .375 INJECTION, POWDER, FOR SOLUTION INTRAVENOUS at 14:39

## 2022-08-24 RX ADMIN — CARVEDILOL 6.25 MG: 3.12 TABLET, FILM COATED ORAL at 17:11

## 2022-08-24 RX ADMIN — PIPERACILLIN AND TAZOBACTAM 3.38 G: 3; .375 INJECTION, POWDER, FOR SOLUTION INTRAVENOUS at 04:45

## 2022-08-24 RX ADMIN — BUSPIRONE HYDROCHLORIDE 15 MG: 5 TABLET ORAL at 17:11

## 2022-08-24 RX ADMIN — OXYCODONE AND ACETAMINOPHEN 1 TABLET: 10; 325 TABLET ORAL at 08:46

## 2022-08-24 RX ADMIN — BUSPIRONE HYDROCHLORIDE 15 MG: 5 TABLET ORAL at 21:52

## 2022-08-24 RX ADMIN — ONDANSETRON 4 MG: 2 INJECTION INTRAMUSCULAR; INTRAVENOUS at 00:05

## 2022-08-24 RX ADMIN — PIPERACILLIN AND TAZOBACTAM 3.38 G: 3; .375 INJECTION, POWDER, FOR SOLUTION INTRAVENOUS at 23:47

## 2022-08-24 RX ADMIN — PRAVASTATIN SODIUM 10 MG: 10 TABLET ORAL at 21:52

## 2022-08-24 RX ADMIN — BUSPIRONE HYDROCHLORIDE 15 MG: 5 TABLET ORAL at 08:48

## 2022-08-24 RX ADMIN — PRAVASTATIN SODIUM 10 MG: 10 TABLET ORAL at 00:05

## 2022-08-24 RX ADMIN — SODIUM CHLORIDE, PRESERVATIVE FREE 10 ML: 5 INJECTION INTRAVENOUS at 08:58

## 2022-08-24 RX ADMIN — OXYCODONE AND ACETAMINOPHEN 1 TABLET: 10; 325 TABLET ORAL at 00:04

## 2022-08-24 RX ADMIN — ONDANSETRON 4 MG: 2 INJECTION INTRAMUSCULAR; INTRAVENOUS at 00:01

## 2022-08-24 RX ADMIN — VANCOMYCIN HYDROCHLORIDE 1250 MG: 10 INJECTION, POWDER, LYOPHILIZED, FOR SOLUTION INTRAVENOUS at 04:55

## 2022-08-24 RX ADMIN — PANTOPRAZOLE SODIUM 40 MG: 40 TABLET, DELAYED RELEASE ORAL at 08:48

## 2022-08-24 RX ADMIN — ARFORMOTEROL TARTRATE: 15 SOLUTION RESPIRATORY (INHALATION) at 19:32

## 2022-08-24 RX ADMIN — VANCOMYCIN HYDROCHLORIDE 1250 MG: 10 INJECTION, POWDER, LYOPHILIZED, FOR SOLUTION INTRAVENOUS at 02:01

## 2022-08-24 RX ADMIN — DOXYCYCLINE 100 MG: 100 INJECTION, POWDER, LYOPHILIZED, FOR SOLUTION INTRAVENOUS at 00:24

## 2022-08-24 RX ADMIN — DOXYCYCLINE 100 MG: 100 INJECTION, POWDER, LYOPHILIZED, FOR SOLUTION INTRAVENOUS at 08:52

## 2022-08-24 RX ADMIN — PIPERACILLIN AND TAZOBACTAM 3.38 G: 3; .375 INJECTION, POWDER, FOR SOLUTION INTRAVENOUS at 10:12

## 2022-08-24 RX ADMIN — ALLOPURINOL 300 MG: 100 TABLET ORAL at 08:47

## 2022-08-24 RX ADMIN — BUSPIRONE HYDROCHLORIDE 15 MG: 5 TABLET ORAL at 00:04

## 2022-08-24 RX ADMIN — SODIUM CHLORIDE, PRESERVATIVE FREE 10 ML: 5 INJECTION INTRAVENOUS at 21:42

## 2022-08-24 RX ADMIN — PREDNISONE 40 MG: 20 TABLET ORAL at 08:47

## 2022-08-24 RX ADMIN — CITALOPRAM HYDROBROMIDE 40 MG: 20 TABLET ORAL at 08:47

## 2022-08-24 RX ADMIN — DOXYCYCLINE 100 MG: 100 INJECTION, POWDER, LYOPHILIZED, FOR SOLUTION INTRAVENOUS at 21:42

## 2022-08-24 RX ADMIN — ARFORMOTEROL TARTRATE: 15 SOLUTION RESPIRATORY (INHALATION) at 09:05

## 2022-08-24 NOTE — PROGRESS NOTES
Pharmacy Automatic Renal Dosing Protocol - Antimicrobials    Indication for Antimicrobials: Sepsis of Unknown Etiology     Current Regimen of Each Antimicrobial:  Zosyn 3.375g IV q 8h (start: , day 1)  Doxycycline 100mg IV q 12h (start: , day 2/5)    Other abx:  Vancomycin 2000 mg/VANCOMYCIN 1250 mg every 12 hrs (-)    Goal Level: VANCOMYCIN TROUGH GOAL RANGE    Vancomycin Trough: 15 - 20 mcg/mL  (AUC: 400 - 600 mg/hr/Liter/day)     Date Dose & Interval Measured (mcg/mL) Extrapolated (mcg/mL)                       Date & time of next level: none    Significant Cultures:    : blood: 4/4 gram neg rods    Radiology / Imaging results: (X-ray, CT scan or MRI):     Paralysis, amputations, malnutrition:     Labs:  Recent Labs     22  0132 22  2149   CREA 0.60* 0.66*   BUN 25* 16   WBC 16.8* 10.7     Temp (24hrs), Av °F (36.7 °C), Min:97.6 °F (36.4 °C), Max:98.5 °F (36.9 °C)    Is the Patient on Dialysis? Creatinine Clearance (mL/min):   CrCl (Actual Body Weight): 126.5  CrCl (Adjusted Body Weight): 106.5  CrCl (Ideal Body Weight): 93.1    Impression/Plan:   Zosyn + doxycycline (for bartonella species)  Vancomycin discontinued due to gram neg rods in blood  Bmp per protocol  Antimicrobial stop date: 5 days     Pharmacy will follow daily and adjust medications as appropriate for renal function and/or serum levels. Thank you,  JEOVANNY Celis    Recommended duration of therapy  http://[facility]/[place]/[facility]/Pharmacy/Clinical%20Companion/Duration%20of%20ABX%20therapy. docx    Renal Dosing  http://[facility]/[place]/[facility]/Pharmacy/Clinical%20Companion/Renal%[id number]. pdf

## 2022-08-24 NOTE — PROGRESS NOTES
End of Shift Note    Bedside shift change report given to Aidn Arroyo (oncoming nurse) by Maria Elena Frost RN (offgoing nurse).   Report included the following information SBAR, Kardex, MAR, and Recent Results    Shift worked:  Days    Shift summary and any significant changes:    No complaints of pain or significant changes to condition    IV abx continued        Concerns for physician to address:  None    Zone phone for oncoming shift:   [phone]     Patient Information  Jonathan Saldaña  73 y.o.  8/22/2022  9:26 PM by Erna Murray MD. Jonathan Saldaña was admitted from Home    Problem List  Patient Active Problem List    Diagnosis Date Noted    Cellulitis 08/23/2022    Coronary artery disease involving native coronary artery of native heart without angina pectoris 03/09/2022    Hyponatremia 10/13/2017    Anemia 10/13/2017    PAT (paroxysmal atrial tachycardia) ([facility] 10/12/2017    Pulmonary edema 10/11/2017    Accelerated hypertension 10/11/2017    Elevated troponin 10/11/2017    Acute respiratory failure with hypoxia ([address] 10/11/2017    COPD (chronic obstructive pulmonary disease) ([facility] 10/11/2017    NICM (nonischemic cardiomyopathy) ([facility] 10/11/2017    Hip arthritis 09/21/2017    Degenerative joint disease of right hip 02/29/2016     Past Medical History:   Diagnosis Date    Anemia 10/13/2017    Arthritis     CAD (coronary artery disease)     Chronic obstructive pulmonary disease (HCC)     Chronic pain     back    Congestive heart failure (HCC)     GERD (gastroesophageal reflux disease)     Hypertension     Hyponatremia 10/13/2017    Ill-defined condition     Gout    Psychiatric disorder     Generalized Anxiety Disorder    Psychiatric disorder     ETOH abuse       Core Measures:  CVA: No No  CHF:No No  PNA:No No    Activity:  Activity Level: Up ad dacia  Number times ambulated in hallways past shift: 0  Number of times OOB to chair past shift: 0    Cardiac:   Cardiac Monitoring: Yes      Cardiac Rhythm: Sinus Rhythm    Access:   Current line(s): PIV   Central Line? No   PICC LINE? No   Genitourinary:   Urinary status: voiding  Urinary Catheter? No       Respiratory:   O2 Device: None (Room air)  Chronic home O2 use?: NO  Incentive spirometer at bedside: NO       GI:     Current diet:  ADULT DIET Regular  Passing flatus: YES  Tolerating current diet: YES       Pain Management:   Patient states pain is manageable on current regimen: YES    Skin:  Demarcus Score: 22  Interventions: PT/OT consult    Patient Safety:  Fall Score:  Total Score: 1  Interventions: bed/chair alarm     @Rollbelt  @dexterity to release roll belt  Yes/No ( must document dexterity  here by stating Yes or No here, otherwise this is a restraint and must follow restraint documentation policy.)    DVT prophylaxis:  DVT prophylaxis Med- Yes  DVT prophylaxis SCD or ADRIANA- No     Wounds: (If Applicable)  Wounds- Yes  Location RLE    Active Consults:  IP CONSULT TO HOSPITALIST    Length of Stay:  Expected LOS: - - -  Actual LOS: 1  Discharge Plan: Yes Home       Deepak Koch RN

## 2022-08-24 NOTE — PROGRESS NOTES
CARE MANAGEMENT INITIAL ASSESSEMENT      NAME:   Juli Segura   :     1950   MRN:     [id number]       Emergency Contact:  Extended Emergency Contact Information  Primary Emergency Contact: [facility]  Address: [address] [place] [address] [address] , [address] [place] [address] Phone: [phone]  Mobile Phone: [phone]  Relation: Spouse    Advance Directive:  Full Code, has an advance directive. Copy on chart. .    Healthcare Decision Maker:   John Arnold- spouse- [phone]    Reason for Admission:  Mr. Sandi Berg is a 73 y.o. male with history that includes anemia, arthritis, COPD, CAD, and HTN  who was emergently admitted for:  SOB and cellulitis. Patient Active Problem List   Diagnosis Code    Degenerative joint disease of right hip M16.11    Hip arthritis M16.10    Pulmonary edema J81.1    Accelerated hypertension I10    Elevated troponin R77.8    Acute respiratory failure with hypoxia ([facility]) J96.01    COPD (chronic obstructive pulmonary disease) ([facility]) J44.9    NICM (nonischemic cardiomyopathy) ([facility]) I42.8    PAT (paroxysmal atrial tachycardia) ([facility]) I47.1    Hyponatremia E87.1    Anemia D64.9    Coronary artery disease involving native coronary artery of native heart without angina pectoris I25.10    Cellulitis L03.90       Assessment:  Via phone with patient. RUR:  12%  Risk Level:  Low  Value-based purchasing:   No  Bundle patient:  No    Residency:  Private residence  Exterior Steps:   3  Interior Steps:  None    Lives With:  Spouse/Significant Other    Prior functioning:  Independent.   Patient requires assistance with:  N/A    Prior DME required:  Cane and Rolling walker    DME available:  Same as above    Rehab history:  None    Discharge Concerns/Barriers Identified:  None identified      Insurer:  Payor: Sherry Mata / Plan: Sherry Mata PART A & B / Product Type: Medicare /     PCP: Edna Page MD   Name of Practice:   [facility]   Current patient: Yes   Approximate date of last visit: couple of weeks ago   Access to virtual PCP visits:  Unknown    Pharmacy:  [facility] [address]   Financial/Difficulty affording medications:  None identified    COVID-19 vaccination status:  Fully vaccinated. Pt/family unable to recall dates. DC Transport:  Family      Transition of care plan:  Home with outpatient follow-up     Comments:   Pt admitted on 8/23/22 for SOB and cellulitis. CM completed initial assessment via phone with Pt. Pt lives at home with his spouse, Chel Ramos. Pt has no hx of HH or home O2. Pt has a cane and walker that he uses PRN. Pt is independent with ADLs. Pt denies any problems with ADLs. Pt reports he has been using cane and walker lately due to difficulty with ambulation. Pt is retired. Pt is able to drive at baseline. Discharge plan is for Pt to return home. Family will transport Pt home.   _____________________________________  Page Kearney, [address] [facility]  Available via [employer]  8/24/2022   11:41 AM      Care Management Interventions  PCP Verified by CM: Yes (Dr. César Scales)  Mode of Transport at Discharge:  Other (see comment) (spouse)  Transition of Care Consult (CM Consult): Discharge Planning  MyChart Signup: No  Discharge Durable Medical Equipment: No  Physical Therapy Consult: No  Occupational Therapy Consult: No  Speech Therapy Consult: No  Support Systems: Spouse/Significant Other  Confirm Follow Up Transport: Self  Discharge Location  Patient Expects to be Discharged to[de-identified] Home

## 2022-08-24 NOTE — PROGRESS NOTES
Hospitalist Progress Note    NAME: Orlin Charles   :  1950   MRN:  [id number]       Assessment / Plan:    COPD with mild flare  Hypotension  Right leg cellulitis  Hx of NICM   Hx of COPD on chronic prednisone 10mg daily. Not on home oxygen  [id number]  Hyponatremia 130        -Cellulitis improving, continue Zosyn  -Continue doxycycline, for possible cat scratch disease  -Blood culture with gram-negative rods  -Improved shortness of breath, continue bronchodilator  -CTA negative for PE  -Echo negative  -Hypotension improved, resume beta-blocker  -WBC increased today, should improve tomorrow, recheck procalcitonin and CBC tomorrow          H/o NICM Ech done 2020 with preserved LVEF 55-60% without significant valvular pathology      2D ECHO 2020  Normal cavity size, wall thickness and systolic function (ejection fraction normal). Estimated left ventricular ejection fraction is 55 - 60%. No regional wall motion abnormality noted. Age-appropriate left ventricular diastolic function. Mildly dilated right ventricle. Mild aortic valve sclerosis. H/O Generalized anxiety disorder Resume home meds     Code Status: Full  Surrogate Decision Maker:     DVT Prophylaxis:   GI Prophylaxis: not indicated     Baseline: independent    Anticipate discharge tomorrow     Subjective:     Chief Complaint / Reason for Physician Visit  \"Doing well, his swelling and redness has improved\". Discussed with RN events overnight. Review of Systems:  Symptom Y/N Comments  Symptom Y/N Comments   Fever/Chills    Chest Pain     Poor Appetite    Edema     Cough    Abdominal Pain     Sputum    Joint Pain     SOB/COWART    Pruritis/Rash     Nausea/vomit    Tolerating PT/OT     Diarrhea    Tolerating Diet     Constipation    Other       Could NOT obtain due to:      Objective:     VITALS:   Last 24hrs VS reviewed since prior progress note.  Most recent are:  Patient Vitals for the past 24 hrs:   Temp Pulse Resp BP SpO2   22 1159 -- 84 -- -- --   08/24/22 0905 -- -- -- -- 95 %   08/24/22 0830 97.6 °F (36.4 °C) 82 18 (!) 136/95 95 %   08/24/22 0800 -- 90 -- -- --   08/23/22 2326 98.5 °F (36.9 °C) 91 18 (!) 181/109 98 %   08/23/22 2140 -- -- -- -- 96 %   08/23/22 2101 -- -- -- -- 95 %   08/23/22 1845 -- 91 19 116/77 96 %   08/23/22 1830 -- 85 13 93/66 96 %   08/23/22 1800 -- 83 10 97/77 96 %   08/23/22 1730 -- 82 15 105/70 96 %   08/23/22 1724 -- 88 16 (!) 114/90 95 %   08/23/22 1550 -- -- -- 136/85 --   08/23/22 1430 -- 85 12 136/85 100 %   08/23/22 1400 -- 83 13 (!) 141/85 100 %   08/23/22 1300 -- 78 22 100/80 100 %       Intake/Output Summary (Last 24 hours) at 8/24/2022 1200  Last data filed at 8/23/2022 1504  Gross per 24 hour   Intake --   Output 800 ml   Net -800 ml        I had a face to face encounter and independently examined this patient on 8/24/2022, as outlined below:  PHYSICAL EXAM:  General: WD, WN. Alert, cooperative, no acute distress    EENT:  EOMI. Anicteric sclerae. MMM  Resp:  CTA bilaterally, no wheezing or rales. No accessory muscle use  CV:  Regular  rhythm,  No edema  GI:  Soft, Non distended, Non tender. +Bowel sounds  Neurologic:  Alert and oriented X 3, normal speech,   Psych:   Good insight. Not anxious nor agitated  Skin:  No rashes. No jaundice  Improved erythema of right lower leg as well as swelling    Reviewed most current lab test results and cultures  YES  Reviewed most current radiology test results   YES  Review and summation of old records today    NO  Reviewed patient's current orders and MAR    YES  PMH/SH reviewed - no change compared to H&P  ________________________________________________________________________  Care Plan discussed with:    Comments   Patient     Family      RN     Care Manager     Consultant                        Multidiciplinary team rounds were held today with , nursing, pharmacist and clinical coordinator.   Patient's plan of care was discussed; medications were reviewed and discharge planning was addressed. ________________________________________________________________________  Total NON critical care TIME:  25   Minutes    Total CRITICAL CARE TIME Spent:   Minutes non procedure based      Comments   >50% of visit spent in counseling and coordination of care     ________________________________________________________________________  Gian Lloyd MD     Procedures: see electronic medical records for all procedures/Xrays and details which were not copied into this note but were reviewed prior to creation of Plan. LABS:  I reviewed today's most current labs and imaging studies.   Pertinent labs include:  Recent Labs     08/24/22  0132 08/22/22 2149   WBC 16.8* 10.7   HGB 11.5* 13.3   HCT 33.5* 39.1    180     Recent Labs     08/24/22  0132 08/22/22 2149   * 130*   K 3.3* 3.6   CL 99 95*   CO2 23 25   * 116*   BUN 25* 16   CREA 0.60* 0.66*   CA 8.7 9.2   ALB  --  3.5   TBILI  --  1.3*   ALT  --  31       Signed: Gian Lloyd MD

## 2022-08-25 LAB
ANION GAP SERPL CALC-SCNC: 6 MMOL/L (ref 5–15)
BACTERIA SPEC CULT: ABNORMAL
BACTERIA SPEC CULT: ABNORMAL
BUN SERPL-MCNC: 18 MG/DL (ref 6–20)
BUN/CREAT SERPL: 26 (ref 12–20)
CALCIUM SERPL-MCNC: 8.8 MG/DL (ref 8.5–10.1)
CHLORIDE SERPL-SCNC: 102 MMOL/L (ref 97–108)
CO2 SERPL-SCNC: 26 MMOL/L (ref 21–32)
COMMENT, HOLDF: NORMAL
CREAT SERPL-MCNC: 0.7 MG/DL (ref 0.7–1.3)
GLUCOSE SERPL-MCNC: 96 MG/DL (ref 65–100)
POTASSIUM SERPL-SCNC: 3.9 MMOL/L (ref 3.5–5.1)
SAMPLES BEING HELD,HOLD: NORMAL
SERVICE CMNT-IMP: ABNORMAL
SODIUM SERPL-SCNC: 134 MMOL/L (ref 136–145)

## 2022-08-25 PROCEDURE — 74011000258 HC RX REV CODE- 258: Performed by: INTERNAL MEDICINE

## 2022-08-25 PROCEDURE — 36415 COLL VENOUS BLD VENIPUNCTURE: CPT

## 2022-08-25 PROCEDURE — 74011250637 HC RX REV CODE- 250/637: Performed by: INTERNAL MEDICINE

## 2022-08-25 PROCEDURE — 74011250636 HC RX REV CODE- 250/636: Performed by: INTERNAL MEDICINE

## 2022-08-25 PROCEDURE — 65270000046 HC RM TELEMETRY

## 2022-08-25 PROCEDURE — 74011000250 HC RX REV CODE- 250: Performed by: HOSPITALIST

## 2022-08-25 PROCEDURE — 80048 BASIC METABOLIC PNL TOTAL CA: CPT

## 2022-08-25 PROCEDURE — 74011250637 HC RX REV CODE- 250/637: Performed by: HOSPITALIST

## 2022-08-25 PROCEDURE — 74011636637 HC RX REV CODE- 636/637: Performed by: STUDENT IN AN ORGANIZED HEALTH CARE EDUCATION/TRAINING PROGRAM

## 2022-08-25 PROCEDURE — 94640 AIRWAY INHALATION TREATMENT: CPT

## 2022-08-25 PROCEDURE — 87040 BLOOD CULTURE FOR BACTERIA: CPT

## 2022-08-25 RX ORDER — VALSARTAN 80 MG/1
80 TABLET ORAL ONCE
Status: COMPLETED | OUTPATIENT
Start: 2022-08-25 | End: 2022-08-25

## 2022-08-25 RX ADMIN — PREDNISONE 40 MG: 20 TABLET ORAL at 11:49

## 2022-08-25 RX ADMIN — ARFORMOTEROL TARTRATE: 15 SOLUTION RESPIRATORY (INHALATION) at 09:36

## 2022-08-25 RX ADMIN — ALLOPURINOL 300 MG: 100 TABLET ORAL at 09:45

## 2022-08-25 RX ADMIN — OXYCODONE AND ACETAMINOPHEN 1 TABLET: 10; 325 TABLET ORAL at 02:45

## 2022-08-25 RX ADMIN — CITALOPRAM HYDROBROMIDE 40 MG: 20 TABLET ORAL at 09:46

## 2022-08-25 RX ADMIN — PANTOPRAZOLE SODIUM 40 MG: 40 TABLET, DELAYED RELEASE ORAL at 09:45

## 2022-08-25 RX ADMIN — AMPICILLIN SODIUM AND SULBACTAM SODIUM 3 G: 2; 1 INJECTION, POWDER, FOR SOLUTION INTRAMUSCULAR; INTRAVENOUS at 11:49

## 2022-08-25 RX ADMIN — BUSPIRONE HYDROCHLORIDE 15 MG: 5 TABLET ORAL at 17:24

## 2022-08-25 RX ADMIN — AMPICILLIN SODIUM AND SULBACTAM SODIUM 3 G: 2; 1 INJECTION, POWDER, FOR SOLUTION INTRAMUSCULAR; INTRAVENOUS at 23:18

## 2022-08-25 RX ADMIN — VALSARTAN 80 MG: 40 TABLET, FILM COATED ORAL at 17:31

## 2022-08-25 RX ADMIN — PRAVASTATIN SODIUM 10 MG: 10 TABLET ORAL at 23:17

## 2022-08-25 RX ADMIN — BUSPIRONE HYDROCHLORIDE 15 MG: 5 TABLET ORAL at 23:17

## 2022-08-25 RX ADMIN — DOXYCYCLINE 100 MG: 100 INJECTION, POWDER, LYOPHILIZED, FOR SOLUTION INTRAVENOUS at 09:39

## 2022-08-25 RX ADMIN — AMPICILLIN SODIUM AND SULBACTAM SODIUM 3 G: 2; 1 INJECTION, POWDER, FOR SOLUTION INTRAMUSCULAR; INTRAVENOUS at 17:24

## 2022-08-25 RX ADMIN — CARVEDILOL 6.25 MG: 3.12 TABLET, FILM COATED ORAL at 09:45

## 2022-08-25 RX ADMIN — ARFORMOTEROL TARTRATE: 15 SOLUTION RESPIRATORY (INHALATION) at 19:39

## 2022-08-25 RX ADMIN — CARVEDILOL 6.25 MG: 3.12 TABLET, FILM COATED ORAL at 17:25

## 2022-08-25 RX ADMIN — OXYCODONE AND ACETAMINOPHEN 1 TABLET: 10; 325 TABLET ORAL at 11:18

## 2022-08-25 RX ADMIN — BUSPIRONE HYDROCHLORIDE 15 MG: 5 TABLET ORAL at 09:45

## 2022-08-25 RX ADMIN — OXYCODONE AND ACETAMINOPHEN 1 TABLET: 10; 325 TABLET ORAL at 19:29

## 2022-08-25 NOTE — PROGRESS NOTES
Problem: Impaired Skin Integrity/Pressure Injury Treatment  Goal: *Improvement of Existing Pressure Injury  Outcome: Progressing Towards Goal  Goal: *Prevention of pressure injury  Description: Document Demarcus Scale and appropriate interventions in the flowsheet.   Outcome: Progressing Towards Goal  Note: Pressure Injury Interventions:                 Mobility Interventions: HOB 30 degrees or less, Float heels    Nutrition Interventions: Document food/fluid/supplement intake

## 2022-08-25 NOTE — PROGRESS NOTES
Comprehensive Nutrition Assessment    Type and Reason for Visit: Initial, Positive nutrition screen    Nutrition Recommendations/Plan:   Continue regular diet     Nutrition Assessment:    Patient medically noted for cellulitis, bacteremia, and COPD. PMH HTN, CM, and CHF. Patient reports a good appetite and eating well. No significant weight changes recently. No nutrition questions or concerns reported. Malnutrition Assessment:  Malnutrition Status:  No malnutrition (08/25/22 1247)      Nutrition Related Findings:    Na 133, K+ 3.3, -  Buspar, Celexa, Protonix, Pravastatin, Prednisone     Current Nutrition Intake & Therapies:  Average Meal Intake: %     ADULT DIET Regular    Anthropometric Measures:  Height: 5' 7\" (170.2 cm)  Ideal Body Weight (IBW): 148 lbs (67 kg)     Current Body Wt:  90.1 kg (198 lb 10.2 oz), 134.2 % IBW. Current BMI (kg/m2): 31.1                          BMI Category: Obese class 1 (BMI 30.0-34. 9)    Estimated Daily Nutrient Needs:  Energy Requirements Based On: Formula  Weight Used for Energy Requirements: Current  Energy (kcal/day): 2111 kcals (BMR 1624 x 1. 3AF)  Weight Used for Protein Requirements: Current  Protein (g/day): 90g (1.0 g/kg bw)  Method Used for Fluid Requirements: 1 ml/kcal  Fluid (ml/day): 2100 mL    Nutrition Diagnosis:   No nutrition diagnosis at this time     Nutrition Interventions:   Food and/or Nutrient Delivery: Continue current diet  Nutrition Education/Counseling: No recommendations at this time  Coordination of Nutrition Care: Continue to monitor while inpatient       Goals:     Goals: PO intake 75% or greater, by next RD assessment       Nutrition Monitoring and Evaluation:   Behavioral-Environmental Outcomes: None identified  Food/Nutrient Intake Outcomes: Food and nutrient intake  Physical Signs/Symptoms Outcomes: Biochemical data, Weight    Discharge Planning:    Continue current diet    Lanell Goldmann, RD  Contact: ext [phone]

## 2022-08-25 NOTE — PROGRESS NOTES
End of Shift Note     Bedside shift change report given to Marialuisa Bray (oncoming nurse) by Amanda Beatty RN (offgoing nurse).   Report included the following information SBAR, Kardex, MAR, and Recent Results     Shift worked:  Days    Shift summary and any significant changes:     Blood cultures drawn    Lab drawn          Concerns for physician to address:  None    Zone phone for oncoming shift:   [phone]      Patient Information  Orlin Charles  73 y.o.  8/22/2022  9:26 PM by Yasmin Kingston MD. Orlin Charles was admitted from Home     Problem List       Patient Active Problem List     Diagnosis Date Noted    Cellulitis 08/23/2022    Coronary artery disease involving native coronary artery of native heart without angina pectoris 03/09/2022    Hyponatremia 10/13/2017    Anemia 10/13/2017    PAT (paroxysmal atrial tachycardia) ([facility] [address] 10/12/2017    Pulmonary edema 10/11/2017    Accelerated hypertension 10/11/2017    Elevated troponin 10/11/2017    Acute respiratory failure with hypoxia ([facility] [address] 10/11/2017    COPD (chronic obstructive pulmonary disease) ([facility] [address] 10/11/2017    NICM (nonischemic cardiomyopathy) ([facility] [address] 10/11/2017    Hip arthritis 09/21/2017    Degenerative joint disease of right hip 02/29/2016           Past Medical History:   Diagnosis Date    Anemia 10/13/2017    Arthritis      CAD (coronary artery disease)      Chronic obstructive pulmonary disease (HCC)      Chronic pain       back    Congestive heart failure (HCC)      GERD (gastroesophageal reflux disease)      Hypertension      Hyponatremia 10/13/2017    Ill-defined condition       Gout    Psychiatric disorder       Generalized Anxiety Disorder    Psychiatric disorder       ETOH abuse         Core Measures:  CVA: No No  CHF:No No  PNA:No No     Activity:  Activity Level: Up ad dacia  Number times ambulated in hallways past shift: 0  Number of times OOB to chair past shift: 0     Cardiac:   Cardiac Monitoring: Yes      Cardiac Rhythm: Sinus Rhythm     Access: Current line(s): PIV   Central Line? No   PICC LINE? No   Genitourinary:   Urinary status: voiding  Urinary Catheter? No         Respiratory:   O2 Device: None (Room air)  Chronic home O2 use?: NO  Incentive spirometer at bedside: NO     GI:  Current diet:  ADULT DIET Regular  Passing flatus: YES  Tolerating current diet: YES     Pain Management:   Patient states pain is manageable on current regimen: YES     Skin:  Demarcus Score: 22  Interventions: PT/OT consult    Patient Safety:  Fall Score:  Total Score: 1  Interventions: bed/chair alarm  @Rollbelt  @dexterity to release roll belt  Yes/No ( must document dexterity  here by stating Yes or No here, otherwise this is a restraint and must follow restraint documentation policy.)     DVT prophylaxis:  DVT prophylaxis Med- Yes  DVT prophylaxis SCD or ADRIANA- No      Wounds: (If Applicable)  Wounds- Yes  Location [facility]     Active Consults:  IP CONSULT TO HOSPITALIST     Length of Stay:  Expected LOS: - - -  Actual LOS: 2  Discharge Plan: Yes Home         Cecy Colindres RN

## 2022-08-25 NOTE — PROGRESS NOTES
End of Shift Note    Bedside shift change report given to Danial Booker RN (oncoming nurse) by David Cavazos RN (offgoing nurse). Report included the following information SBAR, Kardex, MAR, and Recent Results    Shift worked:  nights   Shift summary and any significant changes:    No changes overnight       Concerns for physician to address:  None    Zone phone for oncoming shift:   [phone]     Patient Information  Arlene Issa  73 y.o.  8/22/2022  9:26 PM by Kvng Rosales MD. Arlene Issa was admitted from Home    Problem List  Patient Active Problem List    Diagnosis Date Noted    Cellulitis 08/23/2022    Coronary artery disease involving native coronary artery of native heart without angina pectoris 03/09/2022    Hyponatremia 10/13/2017    Anemia 10/13/2017    PAT (paroxysmal atrial tachycardia) ([facility] [address] 10/12/2017    Pulmonary edema 10/11/2017    Accelerated hypertension 10/11/2017    Elevated troponin 10/11/2017    Acute respiratory failure with hypoxia ([facility] [address] 10/11/2017    COPD (chronic obstructive pulmonary disease) ([facility] [address] 10/11/2017    NICM (nonischemic cardiomyopathy) ([facility] [address] 10/11/2017    Hip arthritis 09/21/2017    Degenerative joint disease of right hip 02/29/2016     Past Medical History:   Diagnosis Date    Anemia 10/13/2017    Arthritis     CAD (coronary artery disease)     Chronic obstructive pulmonary disease (HCC)     Chronic pain     back    Congestive heart failure (HCC)     GERD (gastroesophageal reflux disease)     Hypertension     Hyponatremia 10/13/2017    Ill-defined condition     Gout    Psychiatric disorder     Generalized Anxiety Disorder    Psychiatric disorder     ETOH abuse       Core Measures:  CVA: No No  CHF:No No  PNA:No No    Activity:  Activity Level: Up ad dacia  Number times ambulated in hallways past shift: 0  Number of times OOB to chair past shift: 0    Cardiac:   Cardiac Monitoring: Yes      Cardiac Rhythm: Sinus Rhythm    Access:   Current line(s): PIV   Central Line?  No PICC LINE? No   Genitourinary:   Urinary status: voiding  Urinary Catheter? No       Respiratory:   O2 Device: None (Room air)  Chronic home O2 use?: NO  Incentive spirometer at bedside: NO       GI:     Current diet:  ADULT DIET Regular  Passing flatus: YES  Tolerating current diet: YES       Pain Management:   Patient states pain is manageable on current regimen: YES    Skin:  Demarcus Score: 21  Interventions: PT/OT consult    Patient Safety:  Fall Score:  Total Score: 1  Interventions: bed/chair alarm     @Rollbelt  @dexterity to release roll belt  Yes/No ( must document dexterity  here by stating Yes or No here, otherwise this is a restraint and must follow restraint documentation policy.)    DVT prophylaxis:  DVT prophylaxis Med- Yes  DVT prophylaxis SCD or ADRIANA- No     Wounds: (If Applicable)  Wounds- Yes  Location [facility]    Active Consults:  IP CONSULT TO HOSPITALIST    Length of Stay:  Expected LOS: 3d 14h  Actual LOS: 2  Discharge Plan: Yes Home       Evin Gomez RN

## 2022-08-25 NOTE — PROGRESS NOTES
Hospitalist Progress Note    NAME: Simone Lynn   :  1950   MRN:  [id number]       Assessment / Plan:    COPD with mild flare  Right leg cellulitis  Gram-negative bacteremia, Pasteurella multocida  Hx of NICM   Hx of COPD on chronic prednisone 10mg daily. Not on home oxygen  [id number]  Hyponatremia 130    -he got scratched by cat few days  -Cellulitis improving,  -Blood culture showed Pasteurella multocida, repeat blood culture today  -Was on Zosyn, Doxy, changed to Unasyn      -Improved shortness of breath, continue bronchodilator  -CTA negative for PE  -Echo negative  -Nursing able to get labs, can hold off on labs today , ordered for tomorrow     Hypertension  Elevated troponin  -On presentation he was hypotensive, now improved, resumed Coreg and ARB  -Elevated troponin likely from demand ischemia, no chest pain. Follow-up cardiology as outpatient    H/o NICM Ech done 2020 with preserved LVEF 55-60% without significant valvular pathology      2D ECHO 2020  Normal cavity size, wall thickness and systolic function (ejection fraction normal). Estimated left ventricular ejection fraction is 55 - 60%. No regional wall motion abnormality noted. Age-appropriate left ventricular diastolic function. Mildly dilated right ventricle. Mild aortic valve sclerosis. H/O Generalized anxiety disorder Resume home meds     Code Status: Full  Surrogate Decision Maker:     DVT Prophylaxis:   GI Prophylaxis: not indicated     Baseline: independent    Anticipate discharge  -/     Subjective:     Chief Complaint / Reason for Physician Visit  Swelling and erythema continues to improve. No fever overnight.   Review of Systems:  Symptom Y/N Comments  Symptom Y/N Comments   Fever/Chills    Chest Pain     Poor Appetite    Edema     Cough    Abdominal Pain     Sputum    Joint Pain     SOB/COWART    Pruritis/Rash     Nausea/vomit    Tolerating PT/OT     Diarrhea    Tolerating Diet     Constipation    Other Could NOT obtain due to:      Objective:     VITALS:   Last 24hrs VS reviewed since prior progress note. Most recent are:  Patient Vitals for the past 24 hrs:   Temp Pulse Resp BP SpO2   08/25/22 0937 -- -- -- -- 95 %   08/25/22 0858 97.8 °F (36.6 °C) 81 18 (!) 162/94 96 %   08/25/22 0747 -- 80 -- -- --   08/25/22 0245 98.6 °F (37 °C) 86 18 (!) 156/93 94 %   08/24/22 2336 98.2 °F (36.8 °C) 71 18 135/79 96 %   08/24/22 1933 -- -- -- -- 97 %   08/24/22 1931 98.6 °F (37 °C) 72 18 123/79 97 %   08/24/22 1709 98.1 °F (36.7 °C) 87 20 (!) 157/84 100 %   08/24/22 1600 -- 81 -- -- --   08/24/22 1230 98 °F (36.7 °C) 86 20 (!) 121/96 100 %   08/24/22 1159 -- 84 -- -- --       No intake or output data in the 24 hours ending 08/25/22 1157       I had a face to face encounter and independently examined this patient on 8/25/2022, as outlined below:  PHYSICAL EXAM:  General: WD, WN. Alert, cooperative, no acute distress    EENT:  EOMI. Anicteric sclerae. MMM  Resp:  CTA bilaterally, no wheezing or rales. No accessory muscle use  CV:  Regular  rhythm,  No edema  GI:  Soft, Non distended, Non tender. +Bowel sounds  Neurologic:  Alert and oriented X 3, normal speech,   Psych:   Good insight. Not anxious nor agitated  Skin:  No rashes. No jaundice  Improved erythema of right lower leg as well as swelling    Reviewed most current lab test results and cultures  YES  Reviewed most current radiology test results   YES  Review and summation of old records today    NO  Reviewed patient's current orders and MAR    YES  PMH/SH reviewed - no change compared to H&P  ________________________________________________________________________  Care Plan discussed with:    Comments   Patient     Family      RN     Care Manager     Consultant                        Multidiciplinary team rounds were held today with , nursing, pharmacist and clinical coordinator.   Patient's plan of care was discussed; medications were reviewed and discharge planning was addressed. ________________________________________________________________________  Total NON critical care TIME:  25   Minutes    Total CRITICAL CARE TIME Spent:   Minutes non procedure based      Comments   >50% of visit spent in counseling and coordination of care     ________________________________________________________________________  Lew Martinez MD     Procedures: see electronic medical records for all procedures/Xrays and details which were not copied into this note but were reviewed prior to creation of Plan. LABS:  I reviewed today's most current labs and imaging studies.   Pertinent labs include:  Recent Labs     08/24/22 0132 08/22/22 2149   WBC 16.8* 10.7   HGB 11.5* 13.3   HCT 33.5* 39.1    180       Recent Labs     08/24/22 0132 08/22/22 2149   * 130*   K 3.3* 3.6   CL 99 95*   CO2 23 25   * 116*   BUN 25* 16   CREA 0.60* 0.66*   CA 8.7 9.2   ALB  --  3.5   TBILI  --  1.3*   ALT  --  31         Signed: Lew Martinez MD

## 2022-08-26 LAB
ANION GAP SERPL CALC-SCNC: 6 MMOL/L (ref 5–15)
BUN SERPL-MCNC: 16 MG/DL (ref 6–20)
BUN/CREAT SERPL: 28 (ref 12–20)
CALCIUM SERPL-MCNC: 8.9 MG/DL (ref 8.5–10.1)
CHLORIDE SERPL-SCNC: 99 MMOL/L (ref 97–108)
CO2 SERPL-SCNC: 33 MMOL/L (ref 21–32)
CREAT SERPL-MCNC: 0.58 MG/DL (ref 0.7–1.3)
ERYTHROCYTE [DISTWIDTH] IN BLOOD BY AUTOMATED COUNT: 12.6 % (ref 11.5–14.5)
GLUCOSE SERPL-MCNC: 104 MG/DL (ref 65–100)
HCT VFR BLD AUTO: 35.8 % (ref 36.6–50.3)
HGB BLD-MCNC: 11.9 G/DL (ref 12.1–17)
MCH RBC QN AUTO: 34.2 PG (ref 26–34)
MCHC RBC AUTO-ENTMCNC: 33.2 G/DL (ref 30–36.5)
MCV RBC AUTO: 102.9 FL (ref 80–99)
NRBC # BLD: 0 K/UL (ref 0–0.01)
NRBC BLD-RTO: 0 PER 100 WBC
PLATELET # BLD AUTO: 228 K/UL (ref 150–400)
PMV BLD AUTO: 8.7 FL (ref 8.9–12.9)
POTASSIUM SERPL-SCNC: 3.7 MMOL/L (ref 3.5–5.1)
PROCALCITONIN SERPL-MCNC: 2.25 NG/ML
RBC # BLD AUTO: 3.48 M/UL (ref 4.1–5.7)
SODIUM SERPL-SCNC: 138 MMOL/L (ref 136–145)
WBC # BLD AUTO: 7.8 K/UL (ref 4.1–11.1)

## 2022-08-26 PROCEDURE — 94640 AIRWAY INHALATION TREATMENT: CPT

## 2022-08-26 PROCEDURE — 74011250637 HC RX REV CODE- 250/637: Performed by: INTERNAL MEDICINE

## 2022-08-26 PROCEDURE — 65270000046 HC RM TELEMETRY

## 2022-08-26 PROCEDURE — 85027 COMPLETE CBC AUTOMATED: CPT

## 2022-08-26 PROCEDURE — 74011636637 HC RX REV CODE- 636/637: Performed by: STUDENT IN AN ORGANIZED HEALTH CARE EDUCATION/TRAINING PROGRAM

## 2022-08-26 PROCEDURE — 36415 COLL VENOUS BLD VENIPUNCTURE: CPT

## 2022-08-26 PROCEDURE — 80048 BASIC METABOLIC PNL TOTAL CA: CPT

## 2022-08-26 PROCEDURE — 74011000258 HC RX REV CODE- 258: Performed by: INTERNAL MEDICINE

## 2022-08-26 PROCEDURE — 84145 PROCALCITONIN (PCT): CPT

## 2022-08-26 PROCEDURE — 74011250636 HC RX REV CODE- 250/636: Performed by: INTERNAL MEDICINE

## 2022-08-26 PROCEDURE — 74011000250 HC RX REV CODE- 250: Performed by: HOSPITALIST

## 2022-08-26 PROCEDURE — 74011250637 HC RX REV CODE- 250/637: Performed by: HOSPITALIST

## 2022-08-26 RX ORDER — METRONIDAZOLE 250 MG/1
500 TABLET ORAL EVERY 12 HOURS
Status: DISCONTINUED | OUTPATIENT
Start: 2022-08-26 | End: 2022-08-27 | Stop reason: HOSPADM

## 2022-08-26 RX ADMIN — CARVEDILOL 6.25 MG: 3.12 TABLET, FILM COATED ORAL at 17:06

## 2022-08-26 RX ADMIN — AMPICILLIN SODIUM AND SULBACTAM SODIUM 3 G: 2; 1 INJECTION, POWDER, FOR SOLUTION INTRAMUSCULAR; INTRAVENOUS at 17:06

## 2022-08-26 RX ADMIN — OXYCODONE AND ACETAMINOPHEN 1 TABLET: 10; 325 TABLET ORAL at 05:44

## 2022-08-26 RX ADMIN — VALSARTAN 80 MG: 40 TABLET, FILM COATED ORAL at 08:28

## 2022-08-26 RX ADMIN — BUSPIRONE HYDROCHLORIDE 15 MG: 5 TABLET ORAL at 15:55

## 2022-08-26 RX ADMIN — CARVEDILOL 6.25 MG: 3.12 TABLET, FILM COATED ORAL at 08:28

## 2022-08-26 RX ADMIN — ARFORMOTEROL TARTRATE: 15 SOLUTION RESPIRATORY (INHALATION) at 08:06

## 2022-08-26 RX ADMIN — CITALOPRAM HYDROBROMIDE 40 MG: 20 TABLET ORAL at 08:28

## 2022-08-26 RX ADMIN — METRONIDAZOLE 500 MG: 250 TABLET ORAL at 22:48

## 2022-08-26 RX ADMIN — PANTOPRAZOLE SODIUM 40 MG: 40 TABLET, DELAYED RELEASE ORAL at 08:28

## 2022-08-26 RX ADMIN — PRAVASTATIN SODIUM 10 MG: 10 TABLET ORAL at 22:49

## 2022-08-26 RX ADMIN — AMPICILLIN SODIUM AND SULBACTAM SODIUM 3 G: 2; 1 INJECTION, POWDER, FOR SOLUTION INTRAMUSCULAR; INTRAVENOUS at 11:25

## 2022-08-26 RX ADMIN — BUSPIRONE HYDROCHLORIDE 15 MG: 5 TABLET ORAL at 08:28

## 2022-08-26 RX ADMIN — BUSPIRONE HYDROCHLORIDE 15 MG: 5 TABLET ORAL at 22:48

## 2022-08-26 RX ADMIN — ARFORMOTEROL TARTRATE: 15 SOLUTION RESPIRATORY (INHALATION) at 19:51

## 2022-08-26 RX ADMIN — ALLOPURINOL 300 MG: 100 TABLET ORAL at 08:28

## 2022-08-26 RX ADMIN — AMPICILLIN SODIUM AND SULBACTAM SODIUM 3 G: 2; 1 INJECTION, POWDER, FOR SOLUTION INTRAMUSCULAR; INTRAVENOUS at 05:30

## 2022-08-26 RX ADMIN — PREDNISONE 40 MG: 20 TABLET ORAL at 08:27

## 2022-08-26 RX ADMIN — OXYCODONE AND ACETAMINOPHEN 1 TABLET: 10; 325 TABLET ORAL at 15:55

## 2022-08-26 NOTE — PROGRESS NOTES
End of Shift Note     Bedside shift change report given to Santhosher Solders (oncoming nurse) by Ronald Valencia RN (offgoing nurse).   Report included the following information SBAR, Kardex, MAR, and Recent Results     Shift worked:  Days    Shift summary and any significant changes:      IV abx continued      Concerns for physician to address:  None    Zone phone for oncoming shift:   [phone]      Patient Information  Henrry Delvalle  73 y.o.  8/22/2022  9:26 PM by Laurie Shell MD. Henrry Delvalle was admitted from Home     Problem List          Patient Active Problem List     Diagnosis Date Noted    Cellulitis 08/23/2022    Coronary artery disease involving native coronary artery of native heart without angina pectoris 03/09/2022    Hyponatremia 10/13/2017    Anemia 10/13/2017    PAT (paroxysmal atrial tachycardia) ([facility] [address] 10/12/2017    Pulmonary edema 10/11/2017    Accelerated hypertension 10/11/2017    Elevated troponin 10/11/2017    Acute respiratory failure with hypoxia ([address] 10/11/2017    COPD (chronic obstructive pulmonary disease) ([facility] [address] 10/11/2017    NICM (nonischemic cardiomyopathy) ([facility] [address] 10/11/2017    Hip arthritis 09/21/2017    Degenerative joint disease of right hip 02/29/2016              Past Medical History:   Diagnosis Date    Anemia 10/13/2017    Arthritis      CAD (coronary artery disease)      Chronic obstructive pulmonary disease (HCC)      Chronic pain       back    Congestive heart failure (HCC)      GERD (gastroesophageal reflux disease)      Hypertension      Hyponatremia 10/13/2017    Ill-defined condition       Gout    Psychiatric disorder       Generalized Anxiety Disorder    Psychiatric disorder       ETOH abuse         Core Measures:  CVA: No No  CHF:No No  PNA:No No     Activity:  Activity Level: Up ad dacia  Number times ambulated in hallways past shift: 0  Number of times OOB to chair past shift: 0     Cardiac:   Cardiac Monitoring: Yes      Cardiac Rhythm: Sinus Rhythm     Access:   Current line(s): PIV   Central Line? No   PICC LINE? No   Genitourinary:   Urinary status: voiding  Urinary Catheter? No         Respiratory:   O2 Device: None (Room air)  Chronic home O2 use?: NO  Incentive spirometer at bedside: NO     GI:  Current diet:  ADULT DIET Regular  Passing flatus: YES  Tolerating current diet: YES     Pain Management:   Patient states pain is manageable on current regimen: YES     Skin:  Demarcus Score: 22  Interventions: PT/OT consult    Patient Safety:  Fall Score:  Total Score: 1  Interventions: bed/chair alarm  @Rollbelt  @dexterity to release roll belt  Yes/No ( must document dexterity  here by stating Yes or No here, otherwise this is a restraint and must follow restraint documentation policy.)     DVT prophylaxis:  DVT prophylaxis Med- Yes  DVT prophylaxis SCD or ADRIANA- No      Wounds: (If Applicable)  Wounds- Yes  Location [facility]     Active Consults:  IP CONSULT TO HOSPITALIST     Length of Stay:  Expected LOS: - - -  Actual LOS: 4  Discharge Plan: Yes Home         Maria Elena Frost RN

## 2022-08-26 NOTE — PROGRESS NOTES
ADULT PROTOCOL: JET AEROSOL  REASSESSMENT    Patient  Belkys Kingston     73 y.o.   male     8/26/2022  10:13 AM    Breath Sounds Pre Procedure: Right Breath Sounds: Diminished                               Left Breath Sounds: Coarse    Breath Sounds Post Procedure: Right Breath Sounds: Diminished                                 Left Breath Sounds: Diminished    Breathing pattern: Pre procedure Breathing Pattern: Regular          Post procedure Breathing Pattern: Regular    Heart Rate: Pre procedure Pulse: 89           Post procedure Pulse: 91    Resp Rate: Pre procedure Respirations: 20           Post procedure Respirations: 20        Cough: Pre procedure Cough: Non-productive               Post procedure Cough: Non-productive    Oxygen: Room Air       Changed: NO    SpO2: Pre procedure SpO2: 95 %                 Post procedure SpO2: 97 %      Nebulizer Therapy: Current medications Aerosolized Medications: Brovana Pulmicort      Changed: NO        Problem List:   Patient Active Problem List   Diagnosis Code    Degenerative joint disease of right hip M16.11    Hip arthritis M16.10    Pulmonary edema J81.1    Accelerated hypertension I10    Elevated troponin R77.8    Acute respiratory failure with hypoxia ([facility]) J96.01    COPD (chronic obstructive pulmonary disease) ([facility]) J44.9    NICM (nonischemic cardiomyopathy) ([facility]) I42.8    PAT (paroxysmal atrial tachycardia) ([facility]) I47.1    Hyponatremia E87.1    Anemia D64.9    Coronary artery disease involving native coronary artery of native heart without angina pectoris I25.10    Cellulitis L03.90       Respiratory Therapist: Gustabo Flood RT

## 2022-08-26 NOTE — PROGRESS NOTES
Hospitalist Progress Note    NAME: Simone Lynn   :  1950   MRN:  [id number]       Assessment / Plan:    Right leg cellulitis s/pa cat scratch POA  Pasteurella multocida bacteremia POA  COPD with mild flare POA  COPD on chronic prednisone 10mg daily. Not on home oxygen  [id number]  Hyponatremia 130  Non ischemic cardiomyopathy  - Scratched by cat few days ago on right leg  - Bacteremic at admission with pasteurella  - Cellulitis improving on amp-sulbactam  - Repeat [facility]  negative so far  - change to ceftriaxone and flagyl  - Echo TTE LVEF 50-55%, normal wall motion, No AR or MR  - Improved shortness of breath, continue bronchodilator  - CTA negative for PE  - check additional blood culture  - Discharge in 1 to 2 days  - Discussed options with patient                1. PICC and IV ceftriaxone                 2. PO antibiotics     Essential hypertension POA  Elevated troponin POA  - On presentation, hypotensive, now improved  - Resumed Coreg and ARB  - Elevated troponin likely from demand ischemia, no chest pain. - Follow-up cardiology as outpatient    [facility] POA   - Echo done 2020 with preserved LVEF 55-60% without significant valvular pathology      Generalized anxiety disorder   -Resume home meds     Code Status: Full  Surrogate Decision Maker:     DVT Prophylaxis:   GI Prophylaxis: not indicated     Baseline: independent       Subjective:     Chief Complaint / Reason for Physician Visit  \"My leg feels better\"  Much less redness and pain in right calf   No fluctuance or tenderness  Swelling and erythema continues to improve. No fever overnight.     Review of Systems:  Symptom Y/N Comments  Symptom Y/N Comments   Fever/Chills n   Chest Pain n    Poor Appetite    Edema     Cough nn   Abdominal Pain n    Sputum    Joint Pain     SOB/COWART n   Pruritis/Rash     Nausea/vomit n   Tolerating PT/OT     Diarrhea n   Tolerating Diet y    Constipation    Other       Could NOT obtain due to:      Objective: VITALS:   Last 24hrs VS reviewed since prior progress note. Most recent are:  Patient Vitals for the past 24 hrs:   Temp Pulse Resp BP SpO2   08/26/22 1545 98.5 °F (36.9 °C) 85 18 (!) 156/86 98 %   08/26/22 0838 98.4 °F (36.9 °C) 84 18 (!) 165/97 97 %   08/26/22 0808 -- -- -- -- 95 %   08/25/22 2100 99 °F (37.2 °C) 84 20 (!) 149/90 --   08/25/22 2050 -- -- -- -- 96 %   08/25/22 1940 -- -- -- -- 95 %       No intake or output data in the 24 hours ending 08/26/22 1819       I had a face to face encounter and independently examined this patient on 8/26/2022, as outlined below:  PHYSICAL EXAM:  General: WD, WN. Alert, cooperative, no acute distress    EENT:  Anicteric sclerae. MMM  Resp:  CTA bilaterally, no wheezing or rales. No accessory muscle use  CV:  Regular  rhythm,  No edema  GI:  Soft, Non distended, Non tender. +Bowel sounds  Neurologic:  Alert and oriented X 3, normal speech,   Psych:   Good insight. Not anxious nor agitated  Skin:  No rashes. No jaundice          Reviewed most current lab test results and cultures  YES  Reviewed most current radiology test results   YES  Review and summation of old records today    NO  Reviewed patient's current orders and MAR    YES  PMH/SH reviewed - no change compared to H&P  ________________________________________________________________________  Care Plan discussed with:    Comments   Patient x    Family      RN x    Care Manager     Consultant                        Multidiciplinary team rounds were held today with , nursing, pharmacist and clinical coordinator. Patient's plan of care was discussed; medications were reviewed and discharge planning was addressed.      ________________________________________________________________________      Comments   >50% of visit spent in counseling and coordination of care     ________________________________________________________________________  Gordan Keen, MD     Procedures: see electronic medical records for all procedures/Xrays and details which were not copied into this note but were reviewed prior to creation of Plan. LABS:  I reviewed today's most current labs and imaging studies.   Pertinent labs include:  Recent Labs     08/26/22  0526 08/24/22  0132   WBC 7.8 16.8*   HGB 11.9* 11.5*   HCT 35.8* 33.5*    210       Recent Labs     08/26/22  0526 08/25/22  1211 08/24/22  0132    134* 133*   K 3.7 3.9 3.3*   CL 99 102 99   CO2 33* 26 23   * 96 136*   BUN 16 18 25*   CREA 0.58* 0.70 0.60*   CA 8.9 8.8 8.7         Signed: Boo Ambriz MD

## 2022-08-27 VITALS
OXYGEN SATURATION: 95 % | SYSTOLIC BLOOD PRESSURE: 137 MMHG | RESPIRATION RATE: 20 BRPM | DIASTOLIC BLOOD PRESSURE: 80 MMHG | HEART RATE: 88 BPM | TEMPERATURE: 97.9 F | HEIGHT: 67 IN | BODY MASS INDEX: 30.9 KG/M2 | WEIGHT: 196.87 LBS

## 2022-08-27 LAB
ANION GAP SERPL CALC-SCNC: 7 MMOL/L (ref 5–15)
BASOPHILS # BLD: 0 K/UL (ref 0–0.1)
BASOPHILS NFR BLD: 0 % (ref 0–1)
BUN SERPL-MCNC: 19 MG/DL (ref 6–20)
BUN/CREAT SERPL: 35 (ref 12–20)
CALCIUM SERPL-MCNC: 8.6 MG/DL (ref 8.5–10.1)
CHLORIDE SERPL-SCNC: 97 MMOL/L (ref 97–108)
CO2 SERPL-SCNC: 32 MMOL/L (ref 21–32)
CREAT SERPL-MCNC: 0.55 MG/DL (ref 0.7–1.3)
DIFFERENTIAL METHOD BLD: ABNORMAL
EOSINOPHIL # BLD: 0 K/UL (ref 0–0.4)
EOSINOPHIL NFR BLD: 0 % (ref 0–7)
ERYTHROCYTE [DISTWIDTH] IN BLOOD BY AUTOMATED COUNT: 12.5 % (ref 11.5–14.5)
GLUCOSE SERPL-MCNC: 98 MG/DL (ref 65–100)
HCT VFR BLD AUTO: 33.7 % (ref 36.6–50.3)
HGB BLD-MCNC: 11 G/DL (ref 12.1–17)
IMM GRANULOCYTES # BLD AUTO: 0.1 K/UL (ref 0–0.04)
IMM GRANULOCYTES NFR BLD AUTO: 1 % (ref 0–0.5)
LYMPHOCYTES # BLD: 0.9 K/UL (ref 0.8–3.5)
LYMPHOCYTES NFR BLD: 10 % (ref 12–49)
MCH RBC QN AUTO: 32.8 PG (ref 26–34)
MCHC RBC AUTO-ENTMCNC: 32.6 G/DL (ref 30–36.5)
MCV RBC AUTO: 100.6 FL (ref 80–99)
MONOCYTES # BLD: 0.9 K/UL (ref 0–1)
MONOCYTES NFR BLD: 11 % (ref 5–13)
NEUTS SEG # BLD: 6.9 K/UL (ref 1.8–8)
NEUTS SEG NFR BLD: 78 % (ref 32–75)
NRBC # BLD: 0 K/UL (ref 0–0.01)
NRBC BLD-RTO: 0 PER 100 WBC
PLATELET # BLD AUTO: 243 K/UL (ref 150–400)
PMV BLD AUTO: 8.5 FL (ref 8.9–12.9)
POTASSIUM SERPL-SCNC: 3.6 MMOL/L (ref 3.5–5.1)
RBC # BLD AUTO: 3.35 M/UL (ref 4.1–5.7)
SODIUM SERPL-SCNC: 136 MMOL/L (ref 136–145)
WBC # BLD AUTO: 8.8 K/UL (ref 4.1–11.1)

## 2022-08-27 PROCEDURE — 74011250637 HC RX REV CODE- 250/637: Performed by: INTERNAL MEDICINE

## 2022-08-27 PROCEDURE — 74011000258 HC RX REV CODE- 258: Performed by: INTERNAL MEDICINE

## 2022-08-27 PROCEDURE — 94640 AIRWAY INHALATION TREATMENT: CPT

## 2022-08-27 PROCEDURE — 85025 COMPLETE CBC W/AUTO DIFF WBC: CPT

## 2022-08-27 PROCEDURE — 80048 BASIC METABOLIC PNL TOTAL CA: CPT

## 2022-08-27 PROCEDURE — 36415 COLL VENOUS BLD VENIPUNCTURE: CPT

## 2022-08-27 PROCEDURE — 74011250637 HC RX REV CODE- 250/637: Performed by: HOSPITALIST

## 2022-08-27 PROCEDURE — 74011000250 HC RX REV CODE- 250: Performed by: HOSPITALIST

## 2022-08-27 PROCEDURE — 87040 BLOOD CULTURE FOR BACTERIA: CPT

## 2022-08-27 PROCEDURE — 74011250636 HC RX REV CODE- 250/636: Performed by: INTERNAL MEDICINE

## 2022-08-27 PROCEDURE — 74011636637 HC RX REV CODE- 636/637: Performed by: STUDENT IN AN ORGANIZED HEALTH CARE EDUCATION/TRAINING PROGRAM

## 2022-08-27 RX ORDER — CEFPODOXIME PROXETIL 200 MG/1
200 TABLET, FILM COATED ORAL 2 TIMES DAILY
Qty: 18 TABLET | Refills: 0 | Status: SHIPPED | OUTPATIENT
Start: 2022-08-28 | End: 2022-09-06

## 2022-08-27 RX ORDER — METRONIDAZOLE 500 MG/1
500 TABLET ORAL EVERY 12 HOURS
Qty: 14 TABLET | Refills: 0 | Status: SHIPPED | OUTPATIENT
Start: 2022-08-27 | End: 2022-09-03

## 2022-08-27 RX ORDER — GUAIFENESIN 100 MG/5ML
81 LIQUID (ML) ORAL DAILY
Qty: 30 TABLET | Refills: 5 | Status: SHIPPED
Start: 2022-08-27

## 2022-08-27 RX ADMIN — CITALOPRAM HYDROBROMIDE 40 MG: 20 TABLET ORAL at 10:43

## 2022-08-27 RX ADMIN — PANTOPRAZOLE SODIUM 40 MG: 40 TABLET, DELAYED RELEASE ORAL at 10:43

## 2022-08-27 RX ADMIN — CARVEDILOL 6.25 MG: 3.12 TABLET, FILM COATED ORAL at 10:43

## 2022-08-27 RX ADMIN — ARFORMOTEROL TARTRATE: 15 SOLUTION RESPIRATORY (INHALATION) at 07:42

## 2022-08-27 RX ADMIN — CEFTRIAXONE 2 G: 2 INJECTION, POWDER, FOR SOLUTION INTRAMUSCULAR; INTRAVENOUS at 10:41

## 2022-08-27 RX ADMIN — OXYCODONE AND ACETAMINOPHEN 1 TABLET: 10; 325 TABLET ORAL at 14:09

## 2022-08-27 RX ADMIN — VALSARTAN 80 MG: 40 TABLET, FILM COATED ORAL at 10:43

## 2022-08-27 RX ADMIN — ALLOPURINOL 300 MG: 100 TABLET ORAL at 10:43

## 2022-08-27 RX ADMIN — BUSPIRONE HYDROCHLORIDE 15 MG: 5 TABLET ORAL at 10:42

## 2022-08-27 RX ADMIN — OXYCODONE AND ACETAMINOPHEN 1 TABLET: 10; 325 TABLET ORAL at 06:08

## 2022-08-27 RX ADMIN — METRONIDAZOLE 500 MG: 250 TABLET ORAL at 10:42

## 2022-08-27 RX ADMIN — PREDNISONE 40 MG: 20 TABLET ORAL at 08:00

## 2022-08-27 RX ADMIN — AMPICILLIN SODIUM AND SULBACTAM SODIUM 3 G: 2; 1 INJECTION, POWDER, FOR SOLUTION INTRAMUSCULAR; INTRAVENOUS at 00:50

## 2022-08-27 NOTE — DISCHARGE SUMMARY
Hospitalist Discharge Note    NAME: Kyler Garcia   :  1950   MRN:  [id number]     Admit date: 2022    Discharge date: 22    PCP: Josefina Lee MD    Discharge Diagnoses:    Right calf cellulitis s/p cat scratch POA    Pasteurella multocida bacteremia POA    COPD with mild flare POA    COPD on chronic prednisone 10mg daily. Not on home oxygen    Hyponatremia 130    Non ischemic cardiomyopathy    Essential hypertension POA    Elevated troponin POA    NICM POA recent LVEF 55-60%      Generalized anxiety disorder     6 mm left upper lobe nodule POA needs repeat chest CT in 6 months     Code Status: Full    Discharge Medications:  Current Discharge Medication List        START taking these medications    Details   metroNIDAZOLE (FLAGYL) 500 mg tablet Take 1 Tablet by mouth every twelve (12) hours for 7 days. Qty: 14 Tablet, Refills: 0      cefpodoxime (VANTIN) 200 mg tablet Take 1 Tablet by mouth two (2) times a day for 9 days. Qty: 18 Tablet, Refills: 0           CONTINUE these medications which have NOT CHANGED    Details   ALPRAZolam (XANAX) 1 mg tablet TAKE 1-2 TABS BY MOUTH AS NEEDED 30 MIN PRIOR TO MRI      carvediloL (COREG) 6.25 mg tablet TAKE 1 TABLET BY MOUTH TWICE A DAY  Qty: 180 Tablet, Refills: 3      clotrimazole-betamethasone (LOTRISONE) 1-0.05 % lotion Apply 30 mL to affected area daily. losartan (COZAAR) 50 mg tablet Take 1 Tablet by mouth two (2) times a day. Qty: 90 Tablet, Refills: 3      predniSONE (DELTASONE) 10 mg tablet Take 10 mg by mouth daily. pravastatin (PRAVACHOL) 10 mg tablet TAKE 1 TABLET BY MOUTH NIGHTLY  Qty: 90 Tab, Refills: 3      oxyCODONE-acetaminophen (PERCOCET 10)  mg per tablet Take 1 Tab by mouth three (3) times daily as needed for Pain (Back pain). loratadine (CLARITIN) 10 mg tablet Take 10 mg by mouth. allopurinol (ZYLOPRIM) 300 mg tablet Take 300 mg by mouth daily.       omeprazole (PRILOSEC) 20 mg capsule Take 20 mg by mouth daily. busPIRone (BUSPAR) 15 mg tablet Take 15 mg by mouth three (3) times daily. citalopram (CELEXA) 40 mg tablet Take 40 mg by mouth daily. budesonide-formoterol (SYMBICORT) 160-4.5 mcg/actuation HFA inhaler Take 2 Puffs by inhalation two (2) times a day. tiotropium (SPIRIVA) 18 mcg inhalation capsule Take 1 Cap by inhalation daily. albuterol (PROVENTIL HFA, VENTOLIN HFA, PROAIR HFA) 90 mcg/actuation inhaler Take 2 Puffs by inhalation daily as needed for Wheezing or Shortness of Breath. STOP taking these medications       ketoconazole (NIZORAL) 2 % shampoo Comments:   Reason for Stopping:         diclofenac EC (VOLTAREN) 75 mg EC tablet Comments:   Reason for Stopping: Follow-up Information       Follow up With Specialties Details Why Contact Info    Hilario Lang MD Family Medicine Schedule an appointment as soon as possible for a visit in 1 week(s)  [address] [place] [address]  [phone]              Time spent on discharge:   I spent greater than 30 minutes on discharge, seeing and examining the patient, reconciling home meds and new meds, coordinating care with case management, doing the discharge papers and the D/C summary    Discharge disposition: home    Discharge Condition: Stable    Summary of admission H+P(copied from Dr Álvarez's Note):     CHIEF COMPLAINT:  SOB  and Right leg pain/swelling     HISTORY OF PRESENT ILLNESS:     Freddy Ramos is a 73 y.o.   male who presents with  sob and right leg pain. Pt CAME in to ED via EMS from home with report of difficulty breathing that started yesterday and got worse 30 minutes prior to arrival, also reports possible infection to RLE from a cat scratch on 8/20/22, RLE swollen/red/painful. In ED initially was normotensive but the became Hypotensive which responded to IVF. Pt. was also given abx sepsis  2/2 right leg cellulitis.      He has a PMHx of non-ischemic cardiomyopathy, non-obstructive CAD, HTN, HLD and COPD. Longstanding COPD. He says he has been quite stable for at least 5 years. He no longer sees a pulmonologist.He sees Dr. Carlos Salazar annually for a history of CHF but says he has not had any exacerbations since 2016. Chronic back pain on opioids. We were asked to admit for work up and evaluation of the above problems. Past Medical History:   Diagnosis Date    Anemia 10/13/2017    Arthritis      CAD (coronary artery disease)      Chronic obstructive pulmonary disease (HCC)      Chronic pain       back    Congestive heart failure (HCC)      GERD (gastroesophageal reflux disease)      Hypertension      Hyponatremia 10/13/2017    Ill-defined condition       Gout    Psychiatric disorder       Generalized Anxiety Disorder    Psychiatric disorder       ETOH abuse      CTA chest read by radiology FINDINGS:  CHEST WALL: No chest wall mass or axillary lymphadenopathy. THYROID: No nodule. MEDIASTINUM: No mass or lymphadenopathy. KENAN: No mass or lymphadenopathy. THORACIC AORTA: No dissection or aneurysm. PULMONARY ARTERIES: Main pulmonary artery is normal in caliber. No evidence of  acute pulmonary emboli. TRACHEA/BRONCHI: Patent. ESOPHAGUS: No wall thickening or dilatation. HEART: Normal in size. Mild coronary artery calcifications. PLEURA: No effusion or pneumothorax. LUNGS: New 6 mm left upper lobe pulmonary nodule (series 2, image 102). Unchanged 3 mm left upper lobe pulmonary nodule (2/91). Mild centrilobular  emphysema. INCIDENTALLY IMAGED UPPER ABDOMEN: No focal abnormality. BONES: No destructive bone lesion. ADDITIONAL COMMENTS: N/A  IMPRESSION  No evidence of acute pulmonary embolus. Mild centrilobular emphysema. New  indeterminate 6 mm left upper lobe pulmonary nodule; follow-up chest CT in 6  months is recommended to assure stability or resolution.     Echo TTE   Left Ventricle Low normal left ventricular systolic function with a visually estimated EF of 50 - 55%. Left ventricle size is normal. Normal wall thickness. Normal wall motion. Right Ventricle Right ventricle size is normal. Normal systolic function. Right Atrium Right atrium size is normal.   Aortic Valve Mild sclerosis of the aortic valve cusp. No regurgitation. No stenosis. Mitral Valve Thickened leaflet. No regurgitation. No stenosis noted. Tricuspid Valve Valve structure is normal. Mild regurgitation. No stenosis noted. Mildly elevated RVSP. Pulmonic Valve Valve structure is normal. No regurgitation. No stenosis noted. Aorta Normal sized aorta. Pericardium No pericardial effusion. Hospital course:        Right leg cellulitis s/p cat scratch POA  Pasteurella multocida bacteremia POA  COPD with mild flare POA  COPD on chronic prednisone 10mg daily. Not on home oxygen  [id number]  Hyponatremia 130  Non ischemic cardiomyopathy  - Scratched by cat few days ago on right leg  - Bacteremic at admission with pasteurella  - Cellulitis improving on amp-sulbactam  - Repeat [facility] 8/25 and 8/27 negative  - change to ceftriaxone and flagyl  - Echo TTE LVEF 50-55%, normal wall motion, No AR or MR  - Improved shortness of breath, continue bronchodilator  - CTA negative for PE  - Discussed options with patient                1. PICC and IV ceftriaxone = most cautious route                 2. PO antibiotics = he opted for this option  - D/W patient, reports cat is owned by [employer], vaccinated vs rabies         Asked patient to check with cat owner         If not vaccinated, follow up with Health department  - PO cefpodoxime and metronidazole at discharge        Complete 2 weeks of the cephalosporin     Essential hypertension POA  Elevated troponin POA  NICM POA   - On presentation, hypotensive, now improved  - Resumed Coreg and ARB and ASA  - Elevated troponin likely from demand ischemia, no chest pain.     - No ischemic EKG changes  - Follow-up cardiology as outpatient in 2 weeks  - ASA, coreg, losartan, statin  - Echo done 2/2020 with preserved LVEF 55-60% without significant valvular pathology, repeat as above     Generalized anxiety disorder   -Resume home meds    6 mm left upper lobe nodule POA needs repeat chest CT in 6 months     Code Status: Full  Surrogate Decision Maker:     DVT Prophylaxis:   GI Prophylaxis: not indicated     Baseline: independent            Subjective:     Chief Complaint / Reason for Physician Visit  \"My leg feels better\"  Much less redness and pain in right calf   No fluctuance or tenderness  Swelling and erythema continues to improve. No fever overnight. No CP or SOB    Review of Systems:  Symptom Y/N Comments  Symptom Y/N Comments   Fever/Chills n   Chest Pain n    Poor Appetite    Edema     Cough n   Abdominal Pain n    Sputum    Joint Pain     SOB/COWART n   Pruritis/Rash     Nausea/vomit n   Tolerating PT/OT     Diarrhea n   Tolerating Diet y    Constipation    Other       Could NOT obtain due to:      Objective:     VITALS:   Last 24hrs VS reviewed since prior progress note. Most recent are:  Patient Vitals for the past 24 hrs:   Temp Pulse Resp BP SpO2   08/27/22 1254 97.9 °F (36.6 °C) 88 20 137/80 95 %   08/27/22 0857 98.6 °F (37 °C) 84 18 (!) 141/83 97 %   08/26/22 2259 98.2 °F (36.8 °C) 75 18 (!) 152/83 99 %   08/26/22 2013 98.1 °F (36.7 °C) 82 20 (!) 153/102 94 %   08/26/22 1951 -- -- -- -- 97 %   08/26/22 1545 98.5 °F (36.9 °C) 85 18 (!) 156/86 98 %       No intake or output data in the 24 hours ending 08/27/22 1515       I had a face to face encounter and independently examined this patient on 8/27/2022, as outlined below:  PHYSICAL EXAM:  General: WD, WN. Alert, cooperative, no acute distress    EENT:  Anicteric sclerae. MMM  Resp:  CTA bilaterally, no wheezing or rales. No accessory muscle use  CV:  Regular  rhythm,  No edema  GI:  Soft, Non distended, Non tender. +Bowel sounds  Neurologic:  Alert and oriented X 3, normal speech,   Psych:   Good insight.  Not anxious nor agitated  Skin:  No rashes. No jaundice          Reviewed most current lab test results and cultures  YES  Reviewed most current radiology test results   YES  Review and summation of old records today    NO  Reviewed patient's current orders and MAR    YES  PMH/SH reviewed - no change compared to H&P  ________________________________________________________________________  Care Plan discussed with:    Comments   Patient x    Family      RN x    Care Manager     Consultant                        Multidiciplinary team rounds were held today with , nursing, pharmacist and clinical coordinator. Patient's plan of care was discussed; medications were reviewed and discharge planning was addressed. ________________________________________________________________________      Comments   >50% of visit spent in counseling and coordination of care     ________________________________________________________________________  Raina Hall MD     Procedures: see electronic medical records for all procedures/Xrays and details which were not copied into this note but were reviewed prior to creation of Plan. LABS:  I reviewed today's most current labs and imaging studies.   Pertinent labs include:  Recent Labs     08/27/22 0057 08/26/22  0526   WBC 8.8 7.8   HGB 11.0* 11.9*   HCT 33.7* 35.8*    228       Recent Labs     08/27/22 0057 08/26/22  0526 08/25/22  1211    138 134*   K 3.6 3.7 3.9   CL 97 99 102   CO2 32 33* 26   GLU 98 104* 96   BUN 19 16 18   CREA 0.55* 0.58* 0.70   CA 8.6 8.9 8.8         Signed: Raina Hall MD

## 2022-08-27 NOTE — PROGRESS NOTES
Problem: Impaired Skin Integrity/Pressure Injury Treatment  Goal: *Improvement of Existing Pressure Injury  Outcome: Progressing Towards Goal     Problem: Falls - Risk of  Goal: *Absence of Falls  Description: Document Harley Fall Risk and appropriate interventions in the flowsheet.   Outcome: Progressing Towards Goal  Note: Fall Risk Interventions:            Medication Interventions: Bed/chair exit alarm                   Problem: Patient Education: Go to Patient Education Activity  Goal: Patient/Family Education  Outcome: Progressing Towards Goal

## 2022-08-27 NOTE — DISCHARGE INSTRUCTIONS
Patient Discharge Instructions    Belkys Kingston / [id number] : 1950    Admitted 2022 Discharged: 2022         DISCHARGE DIAGNOSIS:      Right leg cellulitis from cat scratch    Pasteurella bacteremia due to the cellulitis      What to do at Home    1. Recommended diet: Cardiac Diet    2. Recommended activity: Activity as tolerated    3. If you experience any of the following symptoms then please call your primary care physician or return to the emergency room if you cannot get hold of your doctor:   Fevers > 100.5, chills   Nausea or vomiting, persistent diarrhea > 24 hours   Blood in stool or black stools   Chest pain or SOB        Follow-up Information       Follow up With Specialties Details Why Contact Info    Ramon Dominguez MD Family Medicine Schedule an appointment as soon as possible for a visit in 1 week(s)  [address] [place] [address]  [phone]       Start the antibiotics in AM 2022 and complete as directed    Information obtained by :  I understand that if any problems occur once I am at home I am to contact my physician. I understand and acknowledge receipt of the instructions indicated above.                                                                                                                                            Physician's or R.N.'s Signature                                                                  Date/Time                                                                                                                                              Patient or Representative Signature                                                          Date/Time

## 2022-08-27 NOTE — PROGRESS NOTES
Transition of Care Plan:    RUR: 11%  Disposition: Home with follow-up appts  Follow up appointments: To be scheduled by patient   DME needed: The patient has a cane and a walker he uses as needed  Transportation at Discharge: The patient's wife is at bedside and will be transporting him home   [place] or means to access home: Yes      IM Medicare Letter: Given & signed on 8/27/22  Is patient a  and connected with the [place]? N/A              If yes, was [employer] transfer form completed and [facility] notified? Caregiver Contact: Ela Myers, Wife, Phone: [phone]  Discharge Caregiver contacted prior to discharge? Yes, CM spoke with the patient's wife at [address] [place] [address] needed?: No    CM was alerted that the patient is being discharged home today, 8/27/22, on oral antibiotics. The patient has no questions/concerns for discharge. His wife is at bedside and will be transporting him home. 2nd IM letter was given & signed. From CM perspective, the patient can be discharged.      Serena Valerio 169, R Chayito Root [address]

## 2022-08-31 LAB
BACTERIA SPEC CULT: NORMAL
SERVICE CMNT-IMP: NORMAL

## 2022-09-02 ENCOUNTER — TELEPHONE (OUTPATIENT)
Dept: SURGERY | Age: 72
End: 2022-09-02

## 2022-09-02 LAB
BACTERIA SPEC CULT: NORMAL
SERVICE CMNT-IMP: NORMAL

## 2022-09-02 NOTE — TELEPHONE ENCOUNTER
Patient called and was seen in ED on 8/22/22, patient would like MD to review his chart and discuss when he can have surgery, please call    [phone]

## 2022-09-09 NOTE — TELEPHONE ENCOUNTER
Called patient to answer his questions. Got his voicemail. Told him we could fix his hernia after his leg wound is healed and no longer infected. We still need to get clearance from cardiology.

## 2022-09-26 ENCOUNTER — OFFICE VISIT (OUTPATIENT)
Dept: CARDIOLOGY CLINIC | Age: 72
End: 2022-09-26
Payer: MEDICARE

## 2022-09-26 VITALS
HEART RATE: 86 BPM | WEIGHT: 194 LBS | BODY MASS INDEX: 30.45 KG/M2 | RESPIRATION RATE: 18 BRPM | SYSTOLIC BLOOD PRESSURE: 138 MMHG | DIASTOLIC BLOOD PRESSURE: 88 MMHG | HEIGHT: 67 IN | OXYGEN SATURATION: 96 %

## 2022-09-26 DIAGNOSIS — I47.1 PAT (PAROXYSMAL ATRIAL TACHYCARDIA) (HCC): ICD-10-CM

## 2022-09-26 DIAGNOSIS — Z09 HOSPITAL DISCHARGE FOLLOW-UP: ICD-10-CM

## 2022-09-26 DIAGNOSIS — L03.90 CELLULITIS, UNSPECIFIED CELLULITIS SITE: ICD-10-CM

## 2022-09-26 DIAGNOSIS — I42.8 NICM (NONISCHEMIC CARDIOMYOPATHY) (HCC): Primary | ICD-10-CM

## 2022-09-26 DIAGNOSIS — I25.10 CORONARY ARTERY DISEASE INVOLVING NATIVE CORONARY ARTERY OF NATIVE HEART WITHOUT ANGINA PECTORIS: ICD-10-CM

## 2022-09-26 PROCEDURE — G9711 PT HX TOT COL OR COLON CA: HCPCS | Performed by: INTERNAL MEDICINE

## 2022-09-26 PROCEDURE — G8427 DOCREV CUR MEDS BY ELIG CLIN: HCPCS | Performed by: INTERNAL MEDICINE

## 2022-09-26 PROCEDURE — 99214 OFFICE O/P EST MOD 30 MIN: CPT | Performed by: INTERNAL MEDICINE

## 2022-09-26 PROCEDURE — G8510 SCR DEP NEG, NO PLAN REQD: HCPCS | Performed by: INTERNAL MEDICINE

## 2022-09-26 PROCEDURE — 93010 ELECTROCARDIOGRAM REPORT: CPT | Performed by: INTERNAL MEDICINE

## 2022-09-26 PROCEDURE — G8754 DIAS BP LESS 90: HCPCS | Performed by: INTERNAL MEDICINE

## 2022-09-26 PROCEDURE — G8417 CALC BMI ABV UP PARAM F/U: HCPCS | Performed by: INTERNAL MEDICINE

## 2022-09-26 PROCEDURE — G8536 NO DOC ELDER MAL SCRN: HCPCS | Performed by: INTERNAL MEDICINE

## 2022-09-26 PROCEDURE — 1101F PT FALLS ASSESS-DOCD LE1/YR: CPT | Performed by: INTERNAL MEDICINE

## 2022-09-26 PROCEDURE — 1111F DSCHRG MED/CURRENT MED MERGE: CPT | Performed by: INTERNAL MEDICINE

## 2022-09-26 PROCEDURE — 93005 ELECTROCARDIOGRAM TRACING: CPT | Performed by: INTERNAL MEDICINE

## 2022-09-26 PROCEDURE — G8752 SYS BP LESS 140: HCPCS | Performed by: INTERNAL MEDICINE

## 2022-09-26 PROCEDURE — G0463 HOSPITAL OUTPT CLINIC VISIT: HCPCS | Performed by: INTERNAL MEDICINE

## 2022-09-26 PROCEDURE — 1123F ACP DISCUSS/DSCN MKR DOCD: CPT | Performed by: INTERNAL MEDICINE

## 2022-09-26 NOTE — PROGRESS NOTES
[address], [place], [address]  [phone]     Subjective:      Simone Lynn is a 73 y.o. male is here for routine f/u. The patient was discharged from [facility] [address] in August 2022 after cellulitis resulting from a cat scratch, and bacteremia:    Right leg cellulitis s/p cat scratch POA  Pasteurella multocida bacteremia POA  COPD with mild flare POA  COPD on chronic prednisone 10mg daily. Not on home oxygen  [id number]  Hyponatremia 130  Non ischemic cardiomyopathy  - Scratched by cat few days ago on right leg  - Bacteremic at admission with pasteurella  - Cellulitis improving on amp-sulbactam  - Repeat BC 8/25 and 8/27 negative  - change to ceftriaxone and flagyl  - Echo TTE LVEF 50-55%, normal wall motion, No AR or MR  - Improved shortness of breath, continue bronchodilator  - CTA negative for PE  - Discussed options with patient                1. PICC and IV ceftriaxone = most cautious route                 2. PO antibiotics = he opted for this option  - D/W patient, reports cat is owned by [employer], vaccinated vs rabies         Asked patient to check with cat owner         If not vaccinated, follow up with Health department  - PO cefpodoxime and metronidazole at discharge        Complete 2 weeks of the cephalosporin     Essential hypertension POA  Elevated troponin POA  NICM POA   - On presentation, hypotensive, now improved  - Resumed Coreg and ARB and ASA  - Elevated troponin likely from demand ischemia, no chest pain. - No ischemic EKG changes  - Follow-up cardiology as outpatient in 2 weeks  - ASA, coreg, losartan, statin  - Echo done 2/2020 with preserved LVEF 55-60% without significant valvular pathology, repeat as above     Generalized anxiety disorder   -Resume home meds     6 mm left upper lobe nodule POA needs repeat chest CT in 6 months       Today, the patient denies chest pain/ shortness of breath, orthopnea, PND, LE edema, palpitations, syncope, or presyncope.        Patient Active Problem List    Diagnosis Date Noted    Cellulitis 08/23/2022    Coronary artery disease involving native coronary artery of native heart without angina pectoris 03/09/2022    Hyponatremia 10/13/2017    Anemia 10/13/2017    PAT (paroxysmal atrial tachycardia) ([facility] 10/12/2017    Pulmonary edema 10/11/2017    Accelerated hypertension 10/11/2017    Elevated troponin 10/11/2017    Acute respiratory failure with hypoxia ([facility] 10/11/2017    COPD (chronic obstructive pulmonary disease) ([facility] 10/11/2017    [facility]nonischemic cardiomyopathy[facility] 10/11/2017    Hip arthritis 09/21/2017    Degenerative joint disease of right hip 02/29/2016      Filiberto Salmeron MD  Past Medical History:   Diagnosis Date    Anemia 10/13/2017    Arthritis     CAD (coronary artery disease)     Chronic obstructive pulmonary disease (HCC)     Chronic pain     back    Congestive heart failure (HCC)     GERD (gastroesophageal reflux disease)     Hypertension     Hyponatremia 10/13/2017    Ill-defined condition     Gout    Psychiatric disorder     Generalized Anxiety Disorder    Psychiatric disorder     ETOH abuse      Past Surgical History:   Procedure Laterality Date    HX OTHER SURGICAL Left     mastoidectomy and inner ear surgery- age  18    HX OTHER SURGICAL      investigational stem cell [facility] with [facility]    HX TONSILLECTOMY      HX TOTAL COLECTOMY  2000    diverticulitis    KY KNEE SCOPE,MED OR LAT MENIS REPAIR Left      Allergies   Allergen Reactions    Adhesive Tape-Silicones Other (comments)     Pulls skin off    Sulfa (Sulfonamide Antibiotics) Shortness of Breath      Family History   Problem Relation Age of Onset    Cancer Mother         lung    OSTEOARTHRITIS Mother     Cancer Father         throat    Cancer Brother         prostate      Social History     Socioeconomic History    Marital status:      Spouse name: Not on file    Number of children: Not on file    Years of education: Not on file    Highest education level: Not on file   Occupational History    Not on file   Tobacco Use    Smoking status: Former     Packs/day: 2.50     Years: 29.00     Pack years: 72.50     Types: Cigarettes     Quit date: 1993     Years since quittin.6    Smokeless tobacco: Never   Vaping Use    Vaping Use: Never used   Substance and Sexual Activity    Alcohol use: No     Alcohol/week: 21.0 standard drinks     Types: 21 Cans of beer per week     Comment: quit march 15 2017 reports was more than 21 beers  per week     Drug use: No    Sexual activity: Not on file   Other Topics Concern    Not on file   Social History Narrative    Not on file     Social Determinants of Health     Financial Resource Strain: Not on file   Food Insecurity: Not on file   Transportation Needs: Not on file   Physical Activity: Not on file   Stress: Not on file   Social Connections: Not on file   Intimate Partner Violence: Not on file   Housing Stability: Not on file      Current Outpatient Medications   Medication Sig    carvediloL (COREG) 6.25 mg tablet TAKE 1 TABLET BY MOUTH TWICE A DAY    clotrimazole-betamethasone (LOTRISONE) 1-0.05 % lotion Apply 30 mL to affected area daily. losartan (COZAAR) 50 mg tablet Take 1 Tablet by mouth two (2) times a day. predniSONE (DELTASONE) 10 mg tablet Take 10 mg by mouth daily. pravastatin (PRAVACHOL) 10 mg tablet TAKE 1 TABLET BY MOUTH NIGHTLY    oxyCODONE-acetaminophen (PERCOCET 10)  mg per tablet Take 1 Tab by mouth three (3) times daily as needed for Pain (Back pain). loratadine (CLARITIN) 10 mg tablet Take 10 mg by mouth. allopurinol (ZYLOPRIM) 300 mg tablet Take 300 mg by mouth daily. omeprazole (PRILOSEC) 20 mg capsule Take 20 mg by mouth daily. busPIRone (BUSPAR) 15 mg tablet Take 15 mg by mouth three (3) times daily. citalopram (CELEXA) 40 mg tablet Take 40 mg by mouth daily.     budesonide-formoterol (SYMBICORT) 160-4.5 mcg/actuation HFA inhaler Take 2 Puffs by inhalation two (2) times a day. tiotropium (SPIRIVA) 18 mcg inhalation capsule Take 1 Cap by inhalation daily. albuterol (PROVENTIL HFA, VENTOLIN HFA, PROAIR HFA) 90 mcg/actuation inhaler Take 2 Puffs by inhalation daily as needed for Wheezing or Shortness of Breath. aspirin 81 mg chewable tablet Take 1 Tablet by mouth daily. (Patient not taking: Reported on 9/26/2022)    ALPRAZolam (XANAX) 1 mg tablet TAKE 1-2 TABS BY MOUTH AS NEEDED 30 MIN PRIOR TO MRI (Patient not taking: Reported on 9/26/2022)     No current facility-administered medications for this visit. Review of Symptoms:  11 systems reviewed, negative other than as stated in the HPI    Physical ExamPhysical Exam:    Vitals:    09/26/22 1112   BP: 138/88   Pulse: 86   Resp: 18   SpO2: 96%   Weight: 194 lb (88 kg)   Height: 5' 7\" (1.702 m)     Body mass index is 30.38 kg/m². General PE  Gen:  NAD  Mental Status - Alert. General Appearance - Not in acute distress. HEENT:  PERRL, no carotid bruits or JVD  Chest and Lung Exam   Inspection: Accessory muscles - No use of accessory muscles in breathing. Auscultation:   Breath sounds: - Normal.   Cardiovascular   Inspection: Jugular vein - Bilateral - Inspection Normal.   Palpation/Percussion:   Apical Impulse: - Normal.   Auscultation: Rhythm - Regular. Heart Sounds - S1 WNL and S2 WNL. No S3 or S4. Murmurs & Other Heart Sounds: Auscultation of the heart reveals - No Murmurs. Peripheral Vascular   Upper Extremity: Inspection - Bilateral - No Cyanotic nailbeds or Digital clubbing. Lower Extremity:   Palpation: Edema - Bilateral - No edema. Abdomen:   Soft, non-tender, bowel sounds are active.   Neuro: A&O times 3, CN and motor grossly WNL    Labs:   Lab Results   Component Value Date/Time    Cholesterol, total 133 10/12/2017 03:04 AM    HDL Cholesterol 57 10/12/2017 03:04 AM    LDL, calculated 64.2 10/12/2017 03:04 AM    Triglyceride 59 10/12/2017 03:04 AM    CHOL/HDL Ratio 2.3 10/12/2017 03:04 AM Lab Results   Component Value Date/Time    CK 83 10/11/2017 11:57 AM     Lab Results   Component Value Date/Time    Sodium 136 08/27/2022 12:57 AM    Potassium 3.6 08/27/2022 12:57 AM    Chloride 97 08/27/2022 12:57 AM    CO2 32 08/27/2022 12:57 AM    Anion gap 7 08/27/2022 12:57 AM    Glucose 98 08/27/2022 12:57 AM    BUN 19 08/27/2022 12:57 AM    Creatinine 0.55 (L) 08/27/2022 12:57 AM    BUN/Creatinine ratio 35 (H) 08/27/2022 12:57 AM    GFR est AA >60 08/27/2022 12:57 AM    GFR est non-AA >60 08/27/2022 12:57 AM    Calcium 8.6 08/27/2022 12:57 AM    Bilirubin, total 1.3 (H) 08/22/2022 09:49 PM    Alk. phosphatase 69 08/22/2022 09:49 PM    Protein, total 7.2 08/22/2022 09:49 PM    Albumin 3.5 08/22/2022 09:49 PM    Globulin 3.7 08/22/2022 09:49 PM    A-G Ratio 0.9 (L) 08/22/2022 09:49 PM    ALT (SGPT) 31 08/22/2022 09:49 PM       EKG:  NSR     08/22/22    ECHO ADULT COMPLETE 08/24/2022 8/24/2022    Interpretation Summary    Left Ventricle: Low normal left ventricular systolic function with a visually estimated EF of 50 - 55%. Left ventricle size is normal. Normal wall thickness. Normal wall motion. Tricuspid Valve: Mildly elevated RVSP. Signed by: Fawn Karimi MD on 8/24/2022  7:43 AM         Assessment:          ICD-10-CM ICD-9-CM    1. NICM (nonischemic cardiomyopathy) ([facility])  I42.8 425.4 AMB POC EKG ROUTINE W/ 12 LEADS, INTER & REP      2. Coronary artery disease involving native coronary artery of native heart without angina pectoris  I25.10 414.01 AMB POC EKG ROUTINE W/ 12 LEADS, INTER & REP      3. PAT (paroxysmal atrial tachycardia) ([facility])  I47.1 427.0 AMB POC EKG ROUTINE W/ 12 LEADS, INTER & REP      4. Hospital discharge follow-up  Z09 V67.59 AK DISCHARGE MEDS RECONCILED W/ CURRENT OUTPATIENT MED LIST      5.  Cellulitis, unspecified cellulitis site  L03.90 682.9           Orders Placed This Encounter    AK DISCHARGE MEDS RECONCILED W/ CURRENT OUTPATIENT MED LIST    AMB POC EKG ROUTINE W/ 12 LEADS, INTER & REP     Order Specific Question:   Reason for Exam:     Answer:   routine        Plan:     NICM (nonischemic cardiomyopathy) ([facility]  Hx of non-ischemic cardiomyopathy, now resolved  Ech done 2/20208/2022 with preserved LVEF without significant valvular pathology  Well compensated on exam today  Continue losartan, carvedilol     Coronary artery disease involving native coronary artery of native heart without angina pectoris  Mild, non-obstructive CAD per cath in 10/2017  Without anginal or anginal equivalent symptoms  Continue ASA (patient states he has not been taking that because he is taking diclofenac, but I advised benefit exceeds risk if only minor bleeding or bruising), BB, and statin therapy     Primary hypertension  controlled     Mixed hyperlipidemia  Continue statin therapy and low fat, low cholesterol diet  Lipids managed by PCP     Preoperative cardiovascular risk assessment for right inguinal laparoscopic hernia repair:  Low cardiac risk to proceed without further testing based on recent echocardiogram showing normal LVEF, no symptoms of concern, and essentially normal coronaries in 2017 with minimal, nonobstructive CAD.      F/u with Dr. Araceli May in 1 year         Barron Lucero MD

## 2022-09-26 NOTE — LETTER
9/26/2022    Patient: Heaven Rojas   YOB: 1950   Date of Visit: 9/26/2022     Giovana Saenz MD  [address] [place] [address]  Via Fax: [phone]    Dear Giovana Saenz MD,      Thank you for referring Mr. Camille Silva to [address] for evaluation. My notes for this consultation are attached. If you have questions, please do not hesitate to call me. I look forward to following your patient along with you.       Sincerely,    Zenobia Nicole MD

## 2022-09-28 ENCOUNTER — TELEPHONE (OUTPATIENT)
Dept: SURGERY | Age: 72
End: 2022-09-28

## 2022-09-28 NOTE — TELEPHONE ENCOUNTER
Cleared for cardiology bacteria infection from cat scratch cleared     right side inguinal hernia clean bill of health ready to surgery    Call back num [phone]

## 2022-10-10 ENCOUNTER — OFFICE VISIT (OUTPATIENT)
Dept: SURGERY | Age: 72
End: 2022-10-10
Payer: MEDICARE

## 2022-10-10 VITALS
TEMPERATURE: 70 F | HEIGHT: 67 IN | HEART RATE: 70 BPM | OXYGEN SATURATION: 94 % | BODY MASS INDEX: 30.76 KG/M2 | WEIGHT: 196 LBS | RESPIRATION RATE: 24 BRPM | SYSTOLIC BLOOD PRESSURE: 138 MMHG | DIASTOLIC BLOOD PRESSURE: 86 MMHG

## 2022-10-10 DIAGNOSIS — K40.90 RIGHT INGUINAL HERNIA: Primary | ICD-10-CM

## 2022-10-10 PROCEDURE — G8432 DEP SCR NOT DOC, RNG: HCPCS | Performed by: SURGERY

## 2022-10-10 PROCEDURE — G8536 NO DOC ELDER MAL SCRN: HCPCS | Performed by: SURGERY

## 2022-10-10 PROCEDURE — G8417 CALC BMI ABV UP PARAM F/U: HCPCS | Performed by: SURGERY

## 2022-10-10 PROCEDURE — 99213 OFFICE O/P EST LOW 20 MIN: CPT | Performed by: SURGERY

## 2022-10-10 PROCEDURE — G8427 DOCREV CUR MEDS BY ELIG CLIN: HCPCS | Performed by: SURGERY

## 2022-10-10 PROCEDURE — 1123F ACP DISCUSS/DSCN MKR DOCD: CPT | Performed by: SURGERY

## 2022-10-10 PROCEDURE — 1101F PT FALLS ASSESS-DOCD LE1/YR: CPT | Performed by: SURGERY

## 2022-10-10 PROCEDURE — G8752 SYS BP LESS 140: HCPCS | Performed by: SURGERY

## 2022-10-10 PROCEDURE — G9711 PT HX TOT COL OR COLON CA: HCPCS | Performed by: SURGERY

## 2022-10-10 PROCEDURE — G8754 DIAS BP LESS 90: HCPCS | Performed by: SURGERY

## 2022-10-10 NOTE — PROGRESS NOTES
Identified pt with two pt identifiers(name and ). Reviewed record in preparation for visit and have obtained necessary documentation. All patient medications has been reviewed. Chief Complaint   Patient presents with    Follow-up     Follow up to discuss right inguinal hernia surgery, last seen 8/15/22 discuss surgery        Health Maintenance Due   Topic    Hepatitis C Screening     COVID-19 Vaccine (1)    Pneumococcal 65+ years (1 - PCV)    DTaP/Tdap/Td series (1 - Tdap)    Shingrix Vaccine Age 49> (1 of 2)    AAA Screening 73-67 YO Male Smoking Patients     Lipid Screen     Medicare Yearly Exam     Flu Vaccine (1)       Vitals:    10/10/22 1253   BP: 138/86   Pulse: 70   Resp: 24   Temp: (!) 70 °F (21.1 °C)   SpO2: 94%   Weight: 88.9 kg (196 lb)   Height: 5' 7\" (1.702 m)   PainSc:   1   PainLoc: Groin       4. Have you been to the ER, urgent care clinic since your last visit? Hospitalized since your last visit? Yes When: [facility] ER    5. Have you seen or consulted any other health care providers outside of the [address] [place] [address] since your last visit? Include any pap smears or colon screening. No      Patient is accompanied by self I have received verbal consent from Winsome Bryan to discuss any/all medical information while they are present in the room.

## 2022-10-10 NOTE — Clinical Note
10/12/2022    Patient: Monika Roque   YOB: 1950   Date of Visit: 10/10/2022     Jennifer Mishra MD  [address] [place] [address]  Via Fax: [phone]    Dear Jennifer Mishra MD,      Thank you for referring Mr. Tammy Noble to [address] [place] [address] for evaluation. My notes for this consultation are attached. If you have questions, please do not hesitate to call me. I look forward to following your patient along with you.       Sincerely,    Mario Valdez MD

## 2022-10-12 NOTE — PROGRESS NOTES
Returns to discuss possible right inguinal hernia repair. He does have a history of COPD but has done well with treatments. He says he tolerated anesthesia well when he had his hips replaced 6 years ago. He says that the hernia pops out every evening but is able to push it back in. Again on exam, we are unable to reproduce the hernia in the office. He has no wheeze presently. He is clear to auscultation bilaterally. He has a regular heart rhythm. He has abundance subcutaneous adipose in the area of the right groin. Assessment and plan: Painful right inguinal hernia by history. Not able to reproduce it on exam.   I explained that he has increased risk for surgery due to his COPD and that we would certainly want to be sure. He assures me that it does pop out almost every night. And he wants to pursue surgery. Given the subcutaneous obesity in the lower abdomen a laparoscopic approach will be best.  This will require general anesthesia and we will discuss this with the anesthesia team.  Otherwise we would need to do it open and except the increased risk of wound complications. This was all discussed with the patient and he would like to proceed.

## 2022-10-21 NOTE — PERIOP NOTES
[facility]  Preoperative Instructions        Surgery Date 10/27/2022    Time of Arrival To Be Called  Contact# [phone]    1. On the day of your surgery, please report to the Surgical Services Registration Desk and sign in at your designated time. The Surgery Center is located to the right of the Emergency Room. 2. You must have someone with you to drive you home. You should not drive a car for 24 hours following surgery. Please make arrangements for a friend or family member to stay with you for the first 24 hours after your surgery. 3. Do not have anything to eat or drink (including water, gum, mints, coffee, juice) after midnight  10/26/2022. ? This may not apply to medications prescribed by your physician. ?(Please note below the special instructions with medications to take the morning of your procedure.)    4. We recommend you do not drink any alcoholic beverages for 24 hours before and after your surgery. 5. Contact your surgeons office for instructions on the following medications: non-steroidal anti-inflammatory drugs (i.e. Advil, Aleve), vitamins, and supplements. (Some surgeons will want you to stop these medications prior to surgery and others may allow you to take them)  **If you are currently taking Plavix, Coumadin, Aspirin and/or other blood-thinning agents, contact your surgeon for instructions. ** Your surgeon will partner with the physician prescribing these medications to determine if it is safe to stop or if you need to continue taking. Please do not stop taking these medications without instructions from your surgeon    6. Wear comfortable clothes. Wear glasses instead of contacts. Do not bring any money or jewelry. Please bring picture ID, insurance card, and any prearranged co-payment or hospital payment. Do not wear make-up, particularly mascara the morning of your surgery.   Do not wear nail polish, particularly if you are having foot /hand surgery. Wear your hair loose or down, no ponytails, buns, toney pins or clips. All body piercings must be removed. Please shower with antibacterial soap for three consecutive days before and on the morning of surgery, but do not apply any lotions, powders or deodorants after the shower on the day of surgery. Please use a fresh towels after each shower. Please sleep in clean clothes and change bed linens the night before surgery. Please do not shave for 48 hours prior to surgery. Shaving of the face is acceptable. 7. You should understand that if you do not follow these instructions your surgery may be cancelled. If your physical condition changes (I.e. fever, cold or flu) please contact your surgeon as soon as possible. 8. It is important that you be on time. If a situation occurs where you may be late, please call [phone] (OR Holding Area). 9. If you have any questions and or problems, please call [phone] (Pre-admission Testing). 10. Your surgery time may be subject to change. You will receive a phone call the evening prior if your time changes. 11.  If having outpatient surgery, you must have someone to drive you here, stay with you during the duration of your stay, and to drive you home at time of discharge. TAKE ALL MEDICATIONS DAY OF SURGERY EXCEPT: vitamin & supplements   Bring your rescue inhalers with you the day of surgery    I understand a pre-operative phone call will be made to verify my surgery time. In the event that I am not available, I give permission for a message to be left on my answering service and/or with another person?   yes         ___________________      __________   _________    (Signature of Patient)             (Witness)                (Date and Time)

## 2022-10-27 ENCOUNTER — HOSPITAL ENCOUNTER (OUTPATIENT)
Age: 72
Setting detail: OUTPATIENT SURGERY
Discharge: HOME | End: 2022-10-27
Attending: SURGERY | Admitting: SURGERY
Payer: MEDICARE

## 2022-10-27 ENCOUNTER — ANESTHESIA (OUTPATIENT)
Dept: SURGERY | Age: 72
End: 2022-10-27
Payer: MEDICARE

## 2022-10-27 ENCOUNTER — ANESTHESIA EVENT (OUTPATIENT)
Dept: SURGERY | Age: 72
End: 2022-10-27
Payer: MEDICARE

## 2022-10-27 VITALS
SYSTOLIC BLOOD PRESSURE: 115 MMHG | HEIGHT: 67 IN | TEMPERATURE: 98.1 F | BODY MASS INDEX: 30.83 KG/M2 | OXYGEN SATURATION: 96 % | RESPIRATION RATE: 14 BRPM | WEIGHT: 196.43 LBS | DIASTOLIC BLOOD PRESSURE: 68 MMHG | HEART RATE: 76 BPM

## 2022-10-27 DIAGNOSIS — K40.90 INGUINAL HERNIA OF RIGHT SIDE WITHOUT OBSTRUCTION OR GANGRENE: Primary | ICD-10-CM

## 2022-10-27 PROCEDURE — 74011250636 HC RX REV CODE- 250/636: Performed by: STUDENT IN AN ORGANIZED HEALTH CARE EDUCATION/TRAINING PROGRAM

## 2022-10-27 PROCEDURE — 76010000875 HC OR TIME 1.5 TO 2HR INTENSV - TIER 2: Performed by: SURGERY

## 2022-10-27 PROCEDURE — 77030031139 HC SUT VCRL2 J&J -A: Performed by: SURGERY

## 2022-10-27 PROCEDURE — 76210000006 HC OR PH I REC 0.5 TO 1 HR: Performed by: SURGERY

## 2022-10-27 PROCEDURE — 74011250636 HC RX REV CODE- 250/636: Performed by: NURSE ANESTHETIST, CERTIFIED REGISTERED

## 2022-10-27 PROCEDURE — 49650 LAP ING HERNIA REPAIR INIT: CPT | Performed by: SURGERY

## 2022-10-27 PROCEDURE — 74011000250 HC RX REV CODE- 250: Performed by: ANESTHESIOLOGY

## 2022-10-27 PROCEDURE — S2900 ROBOTIC SURGICAL SYSTEM: HCPCS | Performed by: SURGERY

## 2022-10-27 PROCEDURE — 77030013079 HC BLNKT BAIR HGGR 3M -A: Performed by: ANESTHESIOLOGY

## 2022-10-27 PROCEDURE — 77030016151 HC PROTCTR LNS DFOG COVD -B: Performed by: SURGERY

## 2022-10-27 PROCEDURE — 77030026438 HC STYL ET INTUB CARD -A: Performed by: ANESTHESIOLOGY

## 2022-10-27 PROCEDURE — 2709999900 HC NON-CHARGEABLE SUPPLY

## 2022-10-27 PROCEDURE — 74011250636 HC RX REV CODE- 250/636: Performed by: ANESTHESIOLOGY

## 2022-10-27 PROCEDURE — 77030040922 HC BLNKT HYPOTHRM STRY -A

## 2022-10-27 PROCEDURE — 74011250637 HC RX REV CODE- 250/637: Performed by: SURGERY

## 2022-10-27 PROCEDURE — 77030002933 HC SUT MCRYL J&J -A: Performed by: SURGERY

## 2022-10-27 PROCEDURE — 74011250636 HC RX REV CODE- 250/636: Performed by: SURGERY

## 2022-10-27 PROCEDURE — 77030018673: Performed by: SURGERY

## 2022-10-27 PROCEDURE — 74011000250 HC RX REV CODE- 250: Performed by: NURSE ANESTHETIST, CERTIFIED REGISTERED

## 2022-10-27 PROCEDURE — C1781 MESH (IMPLANTABLE): HCPCS | Performed by: SURGERY

## 2022-10-27 PROCEDURE — 77030008684 HC TU ET CUF COVD -B: Performed by: ANESTHESIOLOGY

## 2022-10-27 PROCEDURE — 76210000021 HC REC RM PH II 0.5 TO 1 HR: Performed by: SURGERY

## 2022-10-27 PROCEDURE — 77030035277 HC OBTRTR BLDELSS DISP INTU -B: Performed by: SURGERY

## 2022-10-27 PROCEDURE — 77030022704 HC SUT VLOC COVD -B: Performed by: SURGERY

## 2022-10-27 PROCEDURE — 77030020703 HC SEAL CANN DISP INTU -B: Performed by: SURGERY

## 2022-10-27 PROCEDURE — 2709999900 HC NON-CHARGEABLE SUPPLY: Performed by: SURGERY

## 2022-10-27 PROCEDURE — 74011000250 HC RX REV CODE- 250: Performed by: SURGERY

## 2022-10-27 PROCEDURE — 76060000034 HC ANESTHESIA 1.5 TO 2 HR: Performed by: SURGERY

## 2022-10-27 DEVICE — LAPAROSCOPIC SELF-FIXATING MESH, RIGHT ANATOMICAL
Type: IMPLANTABLE DEVICE | Site: GROIN | Status: FUNCTIONAL
Brand: PROGRIP

## 2022-10-27 RX ORDER — SODIUM CHLORIDE, SODIUM LACTATE, POTASSIUM CHLORIDE, CALCIUM CHLORIDE 600; 310; 30; 20 MG/100ML; MG/100ML; MG/100ML; MG/100ML
25 INJECTION, SOLUTION INTRAVENOUS CONTINUOUS
Status: DISCONTINUED | OUTPATIENT
Start: 2022-10-27 | End: 2022-10-27 | Stop reason: HOSPADM

## 2022-10-27 RX ORDER — FENTANYL CITRATE 50 UG/ML
25 INJECTION, SOLUTION INTRAMUSCULAR; INTRAVENOUS
Status: DISCONTINUED | OUTPATIENT
Start: 2022-10-27 | End: 2022-10-27 | Stop reason: HOSPADM

## 2022-10-27 RX ORDER — BUPIVACAINE HYDROCHLORIDE AND EPINEPHRINE 5; 5 MG/ML; UG/ML
INJECTION, SOLUTION EPIDURAL; INTRACAUDAL; PERINEURAL AS NEEDED
Status: DISCONTINUED | OUTPATIENT
Start: 2022-10-27 | End: 2022-10-27 | Stop reason: HOSPADM

## 2022-10-27 RX ORDER — PROPOFOL 10 MG/ML
INJECTION, EMULSION INTRAVENOUS AS NEEDED
Status: DISCONTINUED | OUTPATIENT
Start: 2022-10-27 | End: 2022-10-27 | Stop reason: HOSPADM

## 2022-10-27 RX ORDER — HYDROMORPHONE HYDROCHLORIDE 1 MG/ML
.2-.5 INJECTION, SOLUTION INTRAMUSCULAR; INTRAVENOUS; SUBCUTANEOUS
Status: DISCONTINUED | OUTPATIENT
Start: 2022-10-27 | End: 2022-10-27 | Stop reason: HOSPADM

## 2022-10-27 RX ORDER — GLYCOPYRROLATE 0.2 MG/ML
INJECTION INTRAMUSCULAR; INTRAVENOUS AS NEEDED
Status: DISCONTINUED | OUTPATIENT
Start: 2022-10-27 | End: 2022-10-27 | Stop reason: HOSPADM

## 2022-10-27 RX ORDER — FENTANYL CITRATE 50 UG/ML
50 INJECTION, SOLUTION INTRAMUSCULAR; INTRAVENOUS AS NEEDED
Status: DISCONTINUED | OUTPATIENT
Start: 2022-10-27 | End: 2022-10-27 | Stop reason: HOSPADM

## 2022-10-27 RX ORDER — LIDOCAINE HYDROCHLORIDE 20 MG/ML
INJECTION, SOLUTION EPIDURAL; INFILTRATION; INTRACAUDAL; PERINEURAL AS NEEDED
Status: DISCONTINUED | OUTPATIENT
Start: 2022-10-27 | End: 2022-10-27 | Stop reason: HOSPADM

## 2022-10-27 RX ORDER — LIDOCAINE HYDROCHLORIDE 10 MG/ML
0.1 INJECTION, SOLUTION EPIDURAL; INFILTRATION; INTRACAUDAL; PERINEURAL AS NEEDED
Status: DISCONTINUED | OUTPATIENT
Start: 2022-10-27 | End: 2022-10-27 | Stop reason: HOSPADM

## 2022-10-27 RX ORDER — MIDAZOLAM HYDROCHLORIDE 1 MG/ML
1 INJECTION, SOLUTION INTRAMUSCULAR; INTRAVENOUS AS NEEDED
Status: DISCONTINUED | OUTPATIENT
Start: 2022-10-27 | End: 2022-10-27 | Stop reason: HOSPADM

## 2022-10-27 RX ORDER — OXYCODONE HYDROCHLORIDE 5 MG/1
5 TABLET ORAL
Qty: 20 TABLET | Refills: 0 | Status: SHIPPED | OUTPATIENT
Start: 2022-10-27 | End: 2022-10-30

## 2022-10-27 RX ORDER — DEXAMETHASONE SODIUM PHOSPHATE 4 MG/ML
INJECTION, SOLUTION INTRA-ARTICULAR; INTRALESIONAL; INTRAMUSCULAR; INTRAVENOUS; SOFT TISSUE AS NEEDED
Status: DISCONTINUED | OUTPATIENT
Start: 2022-10-27 | End: 2022-10-27 | Stop reason: HOSPADM

## 2022-10-27 RX ORDER — IBUPROFEN 600 MG/1
600 TABLET ORAL
Qty: 20 TABLET | Refills: 0 | Status: SHIPPED | OUTPATIENT
Start: 2022-10-27

## 2022-10-27 RX ORDER — LABETALOL HYDROCHLORIDE 5 MG/ML
INJECTION, SOLUTION INTRAVENOUS AS NEEDED
Status: DISCONTINUED | OUTPATIENT
Start: 2022-10-27 | End: 2022-10-27 | Stop reason: HOSPADM

## 2022-10-27 RX ORDER — ROCURONIUM BROMIDE 10 MG/ML
INJECTION, SOLUTION INTRAVENOUS AS NEEDED
Status: DISCONTINUED | OUTPATIENT
Start: 2022-10-27 | End: 2022-10-27 | Stop reason: HOSPADM

## 2022-10-27 RX ORDER — EPHEDRINE SULFATE/0.9% NACL/PF 50 MG/5 ML
SYRINGE (ML) INTRAVENOUS AS NEEDED
Status: DISCONTINUED | OUTPATIENT
Start: 2022-10-27 | End: 2022-10-27 | Stop reason: HOSPADM

## 2022-10-27 RX ORDER — HYDROMORPHONE HYDROCHLORIDE 2 MG/ML
INJECTION, SOLUTION INTRAMUSCULAR; INTRAVENOUS; SUBCUTANEOUS AS NEEDED
Status: DISCONTINUED | OUTPATIENT
Start: 2022-10-27 | End: 2022-10-27 | Stop reason: HOSPADM

## 2022-10-27 RX ORDER — POLYETHYLENE GLYCOL 3350 17 G/17G
17 POWDER, FOR SOLUTION ORAL DAILY
Qty: 510 G | Refills: 0 | Status: SHIPPED | OUTPATIENT
Start: 2022-10-27 | End: 2022-11-26

## 2022-10-27 RX ORDER — ONDANSETRON 2 MG/ML
INJECTION INTRAMUSCULAR; INTRAVENOUS AS NEEDED
Status: DISCONTINUED | OUTPATIENT
Start: 2022-10-27 | End: 2022-10-27 | Stop reason: HOSPADM

## 2022-10-27 RX ORDER — ONDANSETRON 2 MG/ML
4 INJECTION INTRAMUSCULAR; INTRAVENOUS AS NEEDED
Status: DISCONTINUED | OUTPATIENT
Start: 2022-10-27 | End: 2022-10-27 | Stop reason: HOSPADM

## 2022-10-27 RX ADMIN — GLYCOPYRROLATE 0.2 MG: 0.2 INJECTION, SOLUTION INTRAMUSCULAR; INTRAVENOUS at 13:28

## 2022-10-27 RX ADMIN — Medication 900 MCG: at 13:21

## 2022-10-27 RX ADMIN — ROCURONIUM BROMIDE 50 MG: 10 INJECTION INTRAVENOUS at 13:11

## 2022-10-27 RX ADMIN — DEXAMETHASONE SODIUM PHOSPHATE 4 MG: 4 INJECTION, SOLUTION INTRAMUSCULAR; INTRAVENOUS at 13:03

## 2022-10-27 RX ADMIN — ONDANSETRON HYDROCHLORIDE 4 MG: 2 INJECTION, SOLUTION INTRAMUSCULAR; INTRAVENOUS at 14:20

## 2022-10-27 RX ADMIN — HYDROMORPHONE HYDROCHLORIDE 1 MG: 2 INJECTION, SOLUTION INTRAMUSCULAR; INTRAVENOUS; SUBCUTANEOUS at 13:03

## 2022-10-27 RX ADMIN — Medication 3 AMPULE: at 12:15

## 2022-10-27 RX ADMIN — PROPOFOL 130 MG: 10 INJECTION, EMULSION INTRAVENOUS at 13:11

## 2022-10-27 RX ADMIN — WATER 2 G: 1 INJECTION INTRAMUSCULAR; INTRAVENOUS; SUBCUTANEOUS at 13:28

## 2022-10-27 RX ADMIN — HYDROMORPHONE HYDROCHLORIDE 1 MG: 2 INJECTION, SOLUTION INTRAMUSCULAR; INTRAVENOUS; SUBCUTANEOUS at 13:11

## 2022-10-27 RX ADMIN — LABETALOL HYDROCHLORIDE 10 MG: 5 INJECTION, SOLUTION INTRAVENOUS at 13:39

## 2022-10-27 RX ADMIN — LABETALOL HYDROCHLORIDE 10 MG: 5 INJECTION, SOLUTION INTRAVENOUS at 13:33

## 2022-10-27 RX ADMIN — SODIUM CHLORIDE, POTASSIUM CHLORIDE, SODIUM LACTATE AND CALCIUM CHLORIDE 25 ML/HR: 600; 310; 30; 20 INJECTION, SOLUTION INTRAVENOUS at 12:14

## 2022-10-27 RX ADMIN — ROCURONIUM BROMIDE 20 MG: 10 INJECTION INTRAVENOUS at 14:00

## 2022-10-27 RX ADMIN — SUGAMMADEX 200 MG: 100 INJECTION, SOLUTION INTRAVENOUS at 14:28

## 2022-10-27 RX ADMIN — SODIUM CHLORIDE, POTASSIUM CHLORIDE, SODIUM LACTATE AND CALCIUM CHLORIDE: 600; 310; 30; 20 INJECTION, SOLUTION INTRAVENOUS at 13:04

## 2022-10-27 RX ADMIN — LIDOCAINE HYDROCHLORIDE 100 MG: 20 INJECTION, SOLUTION EPIDURAL; INFILTRATION; INTRACAUDAL; PERINEURAL at 13:11

## 2022-10-27 NOTE — PROGRESS NOTES
H&P    Assessment:   Inguinal hernia of right side without obstruction or gangrene [K40.90]    Body mass index is 30.77 kg/m². Plan:   ROBOTIC ASSISTED LAPAROSCOPIC RIGHT INGUINAL HERNIA REPAIR WITH MESH (Right) Discussed the risk of surgery including bleeding, infection, injury to adjacent structures, and the risks of general anesthetic. The patient understands the risks; any and all questions were answered to the patient's satisfaction. The patient was counseled at length about the risks of bridgett Covid-19 during their perioperative period and any recovery window from their procedure. The patient was made aware that bridgett Covid-19  may worsen their prognosis for recovering from their procedure and lend to a higher morbidity and/or mortality risk. All material risks, benefits, and reasonable alternatives including postponing the procedure were discussed. The patient wishes to proceed with the procedure at this time. Subjective:      Amado Stearns is a 73 y.o. male who presents for above procedure. Objective:   Blood pressure 128/82, pulse 65, temperature 97.7 °F (36.5 °C), resp. rate 16, height 5' 7\" (1.702 m), weight 89.1 kg (196 lb 6.9 oz), SpO2 97 %.   Temp (24hrs), Av.7 °F (36.5 °C), Min:97.7 °F (36.5 °C), Max:97.7 °F (36.5 °C)      Physical Exam:  PHYSICAL EXAM:    Chest:   [x]   CTA bialterally, no wheezing/rhonchi/rales/crackles    []   wheezing     []   rhonchi     []   crackles     []   use of accessory muscles    Heart:  [x]   regular rate and rhythm     [x]   No murmurs/rubs/gallops    []   irregular rhythm     []   Murmur     []   Rubs     []   Gallops    [facility]     Past Medical History:   Diagnosis Date    Anemia 10/13/2017    Arthritis     CAD (coronary artery disease)     Dr. Levi Cesar    Chronic obstructive pulmonary disease ([facility] [address]     monitored by PCP    Chronic pain     back    Congestive heart failure ([facility] [address]     GERD (gastroesophageal reflux disease)     Hypertension Hyponatremia 10/13/2017    Ill-defined condition     Gout    Psychiatric disorder     Generalized Anxiety Disorder    Psychiatric disorder     ETOH abuse     Past Surgical History:   Procedure Laterality Date    HX OTHER SURGICAL Left     mastoidectomy and inner ear surgery- age  18    HX OTHER SURGICAL      investigational stem cell therapy with [facility]    HX TONSILLECTOMY      HX TOTAL COLECTOMY      diverticulitis    PELVIS/HIP JOINT SURGERY UNLISTED Bilateral     MD KNEE SCOPE,MED OR LAT MENIS REPAIR Left     fall - young age      Family History   Problem Relation Age of Onset    Cancer Mother         lung    OSTEOARTHRITIS Mother     Cancer Father         throat    Cancer Brother         prostate     Social History     Socioeconomic History    Marital status:    Tobacco Use    Smoking status: Former     Packs/day: 2.50     Years: 29.00     Pack years: 72.50     Types: Cigarettes     Quit date: 1993     Years since quittin.7    Smokeless tobacco: Never   Vaping Use    Vaping Use: Never used   Substance and Sexual Activity    Alcohol use: No     Alcohol/week: 21.0 standard drinks     Types: 21 Cans of beer per week     Comment: quit march 15 2017 reports was more than 21 beers  per week     Drug use: No      Prior to Admission medications    Medication Sig Start Date End Date Taking? Authorizing Provider   multivit-min/folic/vit K/lycop (MEN'S 50 PLUS MULTIVITAMIN PO) Take  by mouth two (2) times a day. Yes Provider, Historical   carvediloL (COREG) 6.25 mg tablet TAKE 1 TABLET BY MOUTH TWICE A DAY 22  Yes Darius Carney NP   losartan (COZAAR) 50 mg tablet Take 1 Tablet by mouth two (2) times a day. 3/9/22  Yes Mary Snyder NP   predniSONE (DELTASONE) 10 mg tablet Take 10 mg by mouth daily.  20  Yes Provider, Historical   pravastatin (PRAVACHOL) 10 mg tablet TAKE 1 TABLET BY MOUTH NIGHTLY 18  Yes Dom Ramos NP   oxyCODONE-acetaminophen [facility] 10)  mg per tablet Take 1 Tab by mouth three (3) times daily as needed for Pain (Back pain). Yes Provider, Historical   loratadine (CLARITIN) 10 mg tablet Take 10 mg by mouth. Yes Provider, Historical   allopurinol (ZYLOPRIM) 300 mg tablet Take 300 mg by mouth daily. Yes Provider, Historical   omeprazole (PRILOSEC) 20 mg capsule Take 20 mg by mouth daily. Yes Provider, Historical   busPIRone (BUSPAR) 15 mg tablet Take 15 mg by mouth two (2) times a day. Yes Provider, Historical   citalopram (CELEXA) 40 mg tablet Take 40 mg by mouth daily. Yes Provider, Historical   budesonide-formoterol (SYMBICORT) 160-4.5 mcg/actuation HFA inhaler Take 2 Puffs by inhalation two (2) times a day. Yes Provider, Historical   tiotropium (SPIRIVA) 18 mcg inhalation capsule Take 1 Cap by inhalation daily. Yes Provider, Historical   albuterol (PROVENTIL HFA, VENTOLIN HFA, PROAIR HFA) 90 mcg/actuation inhaler Take 2 Puffs by inhalation daily as needed for Wheezing or Shortness of Breath. Yes Provider, Historical   aspirin 81 mg chewable tablet Take 1 Tablet by mouth daily. Patient not taking: No sig reported 8/27/22   Vinay Pham MD   ALPRAZomax [place]) 1 mg tablet TAKE 1-2 TABS BY MOUTH AS NEEDED 30 MIN PRIOR TO MRI  Patient not taking: No sig reported 6/15/22   Provider, Historical   clotrimazole-betamethasone (LOTRISONE) 1-0.05 % lotion Apply 30 mL to affected area daily. Patient not taking: Reported on 10/27/2022 12/28/21   Provider, Historical     ALLERGIES:    Allergies   Allergen Reactions    Adhesive Tape-Silicones Other (comments)     Pulls skin off    Sulfa (Sulfonamide Antibiotics) Shortness of Breath       Review of Systems:    See ROS in scanned \"H&P\" from 8/15/22 office visit . No changes.              Signed By: Cody Ocampo MD     October 27, 2022

## 2022-10-27 NOTE — ANESTHESIA POSTPROCEDURE EVALUATION
Procedure(s):  ROBOTIC ASSISTED LAPAROSCOPIC RIGHT INGUINAL HERNIA REPAIR WITH MESH. general    Anesthesia Post Evaluation      Multimodal analgesia: multimodal analgesia used between 6 hours prior to anesthesia start to PACU discharge  Patient location during evaluation: PACU  Patient participation: complete - patient participated  Level of consciousness: awake and alert  Pain management: adequate  Airway patency: patent  Anesthetic complications: no  Cardiovascular status: acceptable, hemodynamically stable and blood pressure returned to baseline  Respiratory status: acceptable and nasal cannula  Hydration status: euvolemic  Post anesthesia nausea and vomiting:  none  Final Post Anesthesia Temperature Assessment:  Normothermia (36.0-37.5 degrees C)      INITIAL Post-op Vital signs:   Vitals Value Taken Time   /75 10/27/22 1515   Temp 36.9 °C (98.5 °F) 10/27/22 1441   Pulse 77 10/27/22 1522   Resp 16 10/27/22 1522   SpO2 96 % 10/27/22 1522   Vitals shown include unvalidated device data.

## 2022-10-27 NOTE — BRIEF OP NOTE
[id number]  Brief Postoperative Note    Patient: Reece Claude  YOB: 1950  MRN: [id number]    Date of Procedure: 10/27/2022     Pre-Op Diagnosis: Inguinal hernia of right side without obstruction or gangrene [K40.90]    Post-Op Diagnosis: Same as preoperative diagnosis. Procedure(s):  ROBOTIC ASSISTED LAPAROSCOPIC RIGHT INGUINAL HERNIA REPAIR WITH MESH    Surgeon(s):  Maciej Houston MD    Surgical Assistant: Surg Asst-1: Cain Elias    Anesthesia: General     Estimated Blood Loss (mL): Minimal    Complications: None immediate    Specimens: * No specimens in log *     Implants:   Implant Name Type Inv.  Item Serial No.  Lot No. LRB No. Used Action   MESH SLF-FLT PROGRIP RT -- PROGRIP - SN/A  MESH SLF-FLT PROGRIP RT -- PROGRIP N/A [facility] [id number] Right 1 Implanted       Drains: * No LDAs found *    Findings: indirect inguinal hernia with cord lipoma    Electronically Signed by Chadd Chirinos MD on 10/27/2022 at 3:09 PM

## 2022-10-27 NOTE — DISCHARGE INSTRUCTIONS
Discharge Instructions: Hernia -- Dr. Haile [place]    Call on next business day to arrange appointment for follow up in 3 weeks -- [phone]. Activity:  Walk regularly. No lifting more than 10 pounds for 6 weeks. Light aerobic activity is okay when you feel up to it. You may resume driving in three days unless still requiring narcotics for pain. Return to work in 1-2 weeks but no heavy lifting for 6 weeks. Apply ice to areas of tenderness for 20 minutes at a time. Keep a cloth between the skin and the bag of ice. Work:  You may return to work in 1 or 2 weeks to light activity. No lifting more than 10 pounds (=\"light duty\") for 6 weeks. If your employer can not accommodate \"light duty,\" your employer will need to provide any necessary paperwork to our office. This document with serve as the initial \"note\" to your employer. Diet:  You may resume normal diet. Wound Care:  Glue dressing will fall off on its own. If you experience a lot of drainage, develop redness around the wound, or a fever over 101 F occurs please call the office. There will be significant swelling under the incision(s) that will develop over the next 3-4 days. Ice will help reduce this. Male patients who have inguinal hernia repairs may experience swelling and bruising of the scrotum -- this is normal.  However, if the scrotum becomes tense and painful you should call. Wear a jock strap/athletic supporter for the next week to reduce scrotal swelling. If you can't urinate within 8 hours, call. Intentionally urinate every 2 hours today, even if you don't feel like you have to go. For umbilical, ventral and incisional hernia repairs, wear your abdominal binder at all times for 3 weeks. May remove to shower. May wash binder. Wear a cotton T-shirt under the binder for comfort. Inguinal hernia repairs do not need a binder. You may shower. No baths or swimming for 3 weeks.     Medications:  See attached \"Medication Reconciliation. \"    Resume home medications as indicated on the Medical Reconciliation form. Do not use blood thinners (such as Aspirin, Coumadin, or Plavix) until 3 days after surgery. Pain medications:  Non steroidal antiinflammatories (ibuprofen -- Advil or Motrin) seem to work best for post surgical pain. Try these first.      PUT ICE ON YOUR INCISION(S) 20 MINUTES EVERY HOUR WHILE AWAKE FOR THE NEXT 3 DAYS AT LEAST. PLACE A THIN CLOTH BETWEEN YOUR SKIN AND THE ICE BAG. A narcotic prescription will also be given for breakthrough pain. Tylenol can be used in place of the narcotic, but do not take both at the same time. Colace or Miralax should be used twice daily to prevent constipation while on narcotics. If you are still having trouble having a BM after 1-2 days, try milk of magnesia. If this does not work within 24 hours, try a bottle of magnesium citrate. If this does not work, call us. Narcotics and anesthesia sometimes cause nausea and vomiting. If persistent please call the office. Do not hesitate to call with questions or concerns.     Neftaly Shoemaker MD  Tel [phone]  Fax [phone]   DISCHARGE SUMMARY from Nurse    PATIENT INSTRUCTIONS:    After general anesthesia or intravenous sedation, for 24 hours or while taking prescription narcotics:    Have someone responsible help you with your care  Limit your activities  Do not drive and operate hazardous machinery  Do not make important personal, legal or business decisions  Do not drink alcoholic beverages  If you have not urinated within 8 hours after discharge, please contact your surgeon on call  Resume your medications unless otherwise instructed    From general anesthesia, intravenous sedation, or while taking prescription narcotics, you may experience:    Drowsiness, dizziness, sleepiness, or confusion  Difficulty remembering or delayed reaction times  Difficulty with your balance, especially while walking, move slowly and carefully, do not make sudden position changes  Difficulty focusing or blurred vision    You may not be aware of slight changes in your behavior and/or your reaction time because of the medication used during and after your procedure. Report the following to your surgeon:  Excessive pain, swelling, redness or odor of or around the surgical area  Temperature over 100.5  Nausea and vomiting lasting longer than 4 hours or if unable to take medications  Any signs of decreased circulation or nerve impairment to extremity: change in color, persistent numbness, tingling, coldness or increase pain  Any questions or concerns         IF YOU REPORT TO AN EMERGENCY ROOM, DOCTOR'S OFFICE OR HOSPITAL WITHIN 24 HOURS AFTER YOUR PROCEDURE, BRING THIS SHEET AND YOUR AFTER VISIT SUMMARY WITH YOU AND GIVE IT TO THE PHYSICIAN OR NURSE ATTENDING YOU. These are general instructions for a healthy lifestyle (if applicable): No smoking/ No tobacco products/ Avoid exposure to secondhand smoke  Surgeon General's Warning:  Quitting smoking now greatly reduces serious risk to your health. Obesity, smoking, and sedentary lifestyle greatly increases your risk for illness    A healthy diet, regular physical exercise & weight monitoring are important for maintaining a healthy lifestyle    You may be retaining fluid if you have a history of heart failure or if you experience any of the following symptoms:  Weight gain of 3 pounds or more overnight or 5 pounds in a week, increased swelling in our hands or feet or shortness of breath while lying flat in bed. Please call your doctor as soon as you notice any of these symptoms; do not wait until your next office visit. A common side effect of anesthesia following surgery is nausea and/or vomiting. In order to decrease symptoms, it is wise to avoid foods that are high in fat, greasy foods, milk products, and spicy foods for the first 24 hours.     Acceptable foods for the first 24 hours following surgery include but are not limited to:    soup  broth  toast   crackers   applesauce  bananas   mashed potatoes,  soft or scrambled eggs  oatmeal  jello    It is important to eat when taking your pain medication. This will help to prevent nausea. If possible, please try to time your meals with your medications. It is very important to stay hydrated following surgery. Sip fluids frequently while awake. Avoid acidic drinks such as citrus juices and soda for 24 hours. Carbonated beverages may cause bloating and gas. Acceptable fluids include:    water (flavor packets may add variety)  coffee or tea (in moderation)  Gatorade  Levar-Aid  apple juice  cranberry juice    You are encouraged to cough and deep breathe every hour when awake. This will help to prevent respiratory complications following anesthesia. You may want to hug a pillow when coughing and sneezing to add additional support to the surgical area and to decrease discomfort if you had abdominal or chest surgery. If you are discharged home with support stockings, you may remove them after 24 hours. Support stockings are used to help prevent blood clots in the legs following surgery. TO PREVENT AN INFECTION      [address] YOUR HANDS    To prevent infection, good handwashing is the most important thing you or your caregiver can do. Wash your hands with soap and water or use the hand  we gave you before you touch any wounds. SHOWER    Use the antibacterial soap we gave you when you take a shower. Shower with this soap until your wounds are healed. To reach all areas of your body, you may need someone to help you. Dont forget to clean your belly button with every shower. USE CLEAN SHEETS    Use freshly cleaned sheets on your bed after surgery. To keep the surgery site clean, do not allow pets to sleep with you while your wound is still healing.      STOP SMOKING    Stop smoking, or at least cut back on smoking    Smoking slows your healing. CONTROL YOUR BLOOD SUGAR    High blood sugars slow wound healing. If you are diabetic, control your blood sugar levels before and after your surgery. Please take time to review all of your Home Care Instructions and Medication Information sheets provided in your discharge packet. If you have any questions, please contact your surgeon's office. Thank you. The discharge information has been reviewed with the patient and instruction recipient. The patient and instruction recipient verbalized understanding. Discharge medications reviewed with the patient and instruction recipient and appropriate educational materials and side effects teaching were provided. Please provide this summary of care documentation to your next provider. Oxycodone, Rapid Release (ETH-Oxydose, Oxy IR, Roxicodone) - (By mouth)   Why this medicine is used:   Treats moderate to severe pain. This medicine is a narcotic pain reliever. Contact a nurse or doctor right away if you have: Fast or slow heart beat, shallow breathing, blue lips, skin or fingernails  Anxiety, restlessness, fever, sweating, muscle spasms, twitching, seeing or hearing things that are not there  Extreme weakness, shallow breathing, slow heartbeat  Severe confusion, lightheadedness, dizziness, fainting  Sweating or cold, clammy skin, seizures  Severe constipation, stomach pain, nausea, vomiting     Common side effects:  Mild constipation  Sleepiness, tiredness  © 2017 [facility] Information is for End User's use only and may not be sold, redistributed or otherwise used for commercial purposes.

## 2022-10-27 NOTE — PERIOP NOTES
11:39= ambulated independently to Pre OP; A&Ox4, consents verified (2).    1145= warming blanket applied. 12:35= up to BR to try to void; unable to go; stated \"when you don't have to go, you don't have to go. \"

## 2022-10-27 NOTE — PROGRESS NOTES
TRANSFER - IN REPORT:    Verbal report received from Ramiro Jhaveri(name) on Amado Stearns  being received from OR(unit) for routine post - op      Report consisted of patients Situation, Background, Assessment and   Recommendations(SBAR). Information from the following report(s) SBAR, Kardex, OR Summary, and MAR was reviewed with the receiving nurse. Opportunity for questions and clarification was provided. Assessment completed upon patients arrival to unit and care assumed.

## 2022-10-27 NOTE — ANESTHESIA PREPROCEDURE EVALUATION
Anesthetic History   No history of anesthetic complications            Review of Systems / Medical History  Patient summary reviewed, nursing notes reviewed and pertinent labs reviewed    Pulmonary    COPD               Neuro/Psych         Psychiatric history     Cardiovascular    Hypertension      CHF  Dysrhythmias     Pertinent negatives: No CAD  Exercise tolerance: >4 METS  Comments: 08/2022 EF=50-55%    Hx of PAT   GI/Hepatic/Renal     GERD           Endo/Other        Arthritis     Other Findings   Comments: Regular EtOH use  Chronic pain           Physical Exam    Airway  Mallampati: II  TM Distance: 4 - 6 cm  Neck ROM: normal range of motion   Mouth opening: Normal     Cardiovascular  Regular rate and rhythm,  S1 and S2 normal,  no murmur, click, rub, or gallop             Dental  No notable dental hx       Pulmonary  Breath sounds clear to auscultation               Abdominal  GI exam deferred       Other Findings            Anesthetic Plan    ASA: 3  Anesthesia type: general    Monitoring Plan: BIS      Induction: Intravenous  Anesthetic plan and risks discussed with: Patient

## 2022-10-27 NOTE — PROGRESS NOTES
TRANSFER - OUT REPORT:    Verbal report given to FLAQUITA Blackburn(name) on Milus Hidden  being transferred to phase II(unit) for routine post - op       Report consisted of patients Situation, Background, Assessment and   Recommendations(SBAR). Information from the following report(s) SBAR, Kardex, OR Summary, and MAR was reviewed with the receiving nurse. Lines:   Peripheral IV 10/27/22 Left;Posterior Wrist (Active)   Site Assessment Clean, dry, & intact 10/27/22 1530   Phlebitis Assessment 0 10/27/22 1530   Infiltration Assessment 0 10/27/22 1441   Dressing Status Clean, dry, & intact 10/27/22 1441   Dressing Type Transparent 10/27/22 1441   Hub Color/Line Status Pink; Infusing 10/27/22 1214        Opportunity for questions and clarification was provided.       Patient transported with:   Registered Nurse

## 2022-10-28 NOTE — OP NOTES
[address]  OPERATIVE REPORT    Name:  Jeff Garcia  MR#:  [id number]  :  1950  ACCOUNT #:  [de-identified]  DATE OF SERVICE:  10/27/2022    PREOPERATIVE DIAGNOSIS:  Right inguinal hernia. POSTOPERATIVE DIAGNOSIS:  Right inguinal hernia. PROCEDURE PERFORMED:  Robot-assisted laparoscopic repair of right inguinal hernia with mesh. SURGEON:  Ashley Thapa MD    ASSISTANT:  Staff. ANESTHESIA:  General.    COMPLICATIONS:  None immediate. SPECIMENS REMOVED:  None. IMPLANTS:  Covidien mesh. ESTIMATED BLOOD LOSS:  Minimal.    DRAINS:  None. FINDINGS:  There was an indirect inguinal hernia with cord lipoma on the right. DESCRIPTION OF PROCEDURE:  After obtaining informed consent, the patient was taken to the operating room, placed supine on the operating table. An operative time-out was performed and general endotracheal anesthesia was induced. Preoperative antibiotics were administered and the abdomen was prepped and draped in the usual sterile fashion. An Denette Shells was employed. The abdomen was then entered in the right upper quadrant using an 8-mm optical trocar. The abdomen was insufflated without incident, was visually explored. There were adhesions to the prior scars on the midline and left lower quadrants. Under direct vision, midline port was placed avoiding the adhesions and using laparoscopic vikki these adhesions were taken down. After this, a left-sided port was placed under direct vision. The patient was placed in Trendelenburg position and the robot was docked. The peritoneum adjacent to the right anterior superior iliac spine was incised and carried across the midline. The preperitoneal space was developed using blunt dissection and electrified vikki. The space of Retzius was developed. The hernia sac was then addressed. This was dissected off of the other cord structures and carefully dissected back into the abdominal cavity.   A cord lipoma was encountered and this was dissected off of the cord structures and left in continuity with the other preperitoneal adipose. Once adequate preperitoneal pocket was created. The mesh was introduced and centered on the defect. A piece of Surgicel was left adjacent to the cord. The preperitoneal space was then checked for hemostasis and excellent hemostasis was noted. The peritoneum was closed using absorbable zero V-Loc suture. The abdomen was inspected for hemostasis and excellent hemostasis was noted. The robot was then undocked and the abdomen was desufflated through the ports and these were subsequently removed. The skin incisions were closed with Monocryl and dressed with Dermabond. The patient was recovered from general anesthesia and taken to the recovery area in satisfactory condition. All instrument, sponge and needle counts were reported as correct.       Judi Ng MD      [id number]  D:  10/27/2022 15:14  T:  10/27/2022 21:50  JOB #:  [id number]  CC:  MD Brandon Rodriguez MD

## 2022-11-18 ENCOUNTER — OFFICE VISIT (OUTPATIENT)
Dept: SURGERY | Age: 72
End: 2022-11-18
Payer: MEDICARE

## 2022-11-18 VITALS
HEIGHT: 67 IN | TEMPERATURE: 98.5 F | RESPIRATION RATE: 20 BRPM | OXYGEN SATURATION: 93 % | BODY MASS INDEX: 32.46 KG/M2 | SYSTOLIC BLOOD PRESSURE: 130 MMHG | HEART RATE: 82 BPM | DIASTOLIC BLOOD PRESSURE: 82 MMHG | WEIGHT: 206.8 LBS

## 2022-11-18 DIAGNOSIS — Z09 POSTOPERATIVE EXAMINATION: Primary | ICD-10-CM

## 2022-11-18 PROCEDURE — 99024 POSTOP FOLLOW-UP VISIT: CPT | Performed by: SURGERY

## 2022-11-18 NOTE — PROGRESS NOTES
Status post robot-assisted laparoscopic repair of right inguinal hernia with mesh on 10/27/2022. Feels great. No pain. Very pleased. On exam, the incisions are well-healed and the repair is firmly intact with vigorous Valsalva. Assessment and plan: Appropriate recovery. Reminded of activity restrictions through 6 weeks. Told to call for any concerns. Thank you.

## 2022-11-18 NOTE — Clinical Note
11/18/2022    Patient: Dajuan Montero   YOB: 1950   Date of Visit: 11/18/2022     Jael Vargas MD  [address] [place] [address]  Via Fax: [phone]    Dear Jael Vargas MD,      Thank you for referring Mr. Bhargav Ortiz to [address] [place], [place] [address]  for evaluation. My notes for this consultation are attached. If you have questions, please do not hesitate to call me. I look forward to following your patient along with you.       Sincerely,    Patel Nuñez MD

## 2023-01-11 ENCOUNTER — TRANSCRIBE ORDER (OUTPATIENT)
Dept: SCHEDULING | Age: 73
End: 2023-01-11

## 2023-01-11 DIAGNOSIS — R91.1 LUNG NODULE: Primary | ICD-10-CM

## 2023-03-06 ENCOUNTER — HOSPITAL ENCOUNTER (OUTPATIENT)
Dept: CT IMAGING | Age: 73
Discharge: HOME | End: 2023-03-06
Attending: FAMILY MEDICINE
Payer: MEDICARE

## 2023-03-06 DIAGNOSIS — R91.1 LUNG NODULE: ICD-10-CM

## 2023-03-06 PROCEDURE — 71250 CT THORAX DX C-: CPT

## 2023-10-24 ENCOUNTER — TRANSCRIBE ORDERS (OUTPATIENT)
Facility: HOSPITAL | Age: 73
End: 2023-10-24

## 2023-10-24 DIAGNOSIS — R91.1 PULMONARY NODULE: Primary | ICD-10-CM

## 2023-10-30 ENCOUNTER — HOSPITAL ENCOUNTER (OUTPATIENT)
Facility: HOSPITAL | Age: 73
Discharge: HOME | End: 2023-11-02
Payer: MEDICARE

## 2023-10-30 DIAGNOSIS — R91.1 PULMONARY NODULE: ICD-10-CM

## 2023-10-30 PROCEDURE — 71250 CT THORAX DX C-: CPT

## 2023-11-28 ENCOUNTER — OFFICE VISIT (OUTPATIENT)
Age: 73
End: 2023-11-28
Payer: MEDICARE

## 2023-11-28 VITALS
DIASTOLIC BLOOD PRESSURE: 88 MMHG | OXYGEN SATURATION: 97 % | BODY MASS INDEX: 31.39 KG/M2 | HEART RATE: 76 BPM | HEIGHT: 67 IN | RESPIRATION RATE: 18 BRPM | WEIGHT: 200 LBS | SYSTOLIC BLOOD PRESSURE: 126 MMHG

## 2023-11-28 DIAGNOSIS — I25.10 ATHEROSCLEROSIS OF NATIVE CORONARY ARTERY OF NATIVE HEART WITHOUT ANGINA PECTORIS: ICD-10-CM

## 2023-11-28 DIAGNOSIS — E78.2 MIXED HYPERLIPIDEMIA: ICD-10-CM

## 2023-11-28 DIAGNOSIS — J44.9 CHRONIC OBSTRUCTIVE PULMONARY DISEASE, UNSPECIFIED COPD TYPE (HCC): ICD-10-CM

## 2023-11-28 DIAGNOSIS — I42.8 NICM (NONISCHEMIC CARDIOMYOPATHY) (HCC): Primary | ICD-10-CM

## 2023-11-28 DIAGNOSIS — I10 ESSENTIAL (PRIMARY) HYPERTENSION: ICD-10-CM

## 2023-11-28 DIAGNOSIS — I47.10 SUPRAVENTRICULAR TACHYCARDIA: ICD-10-CM

## 2023-11-28 PROCEDURE — G8427 DOCREV CUR MEDS BY ELIG CLIN: HCPCS | Performed by: INTERNAL MEDICINE

## 2023-11-28 PROCEDURE — 3023F SPIROM DOC REV: CPT | Performed by: INTERNAL MEDICINE

## 2023-11-28 PROCEDURE — 3079F DIAST BP 80-89 MM HG: CPT | Performed by: INTERNAL MEDICINE

## 2023-11-28 PROCEDURE — 99214 OFFICE O/P EST MOD 30 MIN: CPT | Performed by: INTERNAL MEDICINE

## 2023-11-28 PROCEDURE — G8484 FLU IMMUNIZE NO ADMIN: HCPCS | Performed by: INTERNAL MEDICINE

## 2023-11-28 PROCEDURE — 1123F ACP DISCUSS/DSCN MKR DOCD: CPT | Performed by: INTERNAL MEDICINE

## 2023-11-28 PROCEDURE — 3017F COLORECTAL CA SCREEN DOC REV: CPT | Performed by: INTERNAL MEDICINE

## 2023-11-28 PROCEDURE — 93005 ELECTROCARDIOGRAM TRACING: CPT | Performed by: INTERNAL MEDICINE

## 2023-11-28 PROCEDURE — G8417 CALC BMI ABV UP PARAM F/U: HCPCS | Performed by: INTERNAL MEDICINE

## 2023-11-28 PROCEDURE — 3074F SYST BP LT 130 MM HG: CPT | Performed by: INTERNAL MEDICINE

## 2023-11-28 PROCEDURE — 93010 ELECTROCARDIOGRAM REPORT: CPT | Performed by: INTERNAL MEDICINE

## 2023-11-28 PROCEDURE — 1036F TOBACCO NON-USER: CPT | Performed by: INTERNAL MEDICINE

## 2023-11-28 RX ORDER — AMOXICILLIN 500 MG/1
CAPSULE ORAL
COMMUNITY
Start: 2017-04-04

## 2023-11-28 RX ORDER — DICLOFENAC SODIUM 75 MG/1
75 TABLET, DELAYED RELEASE ORAL 2 TIMES DAILY
COMMUNITY
Start: 2023-11-10

## 2023-11-28 RX ORDER — PRAVASTATIN SODIUM 20 MG
20 TABLET ORAL NIGHTLY
Qty: 90 TABLET | Refills: 1 | Status: SHIPPED | OUTPATIENT
Start: 2023-11-28

## 2023-11-28 RX ORDER — AZITHROMYCIN 250 MG/1
TABLET, FILM COATED ORAL
COMMUNITY
Start: 2023-11-15

## 2023-11-28 RX ORDER — PREGABALIN 75 MG/1
75 CAPSULE ORAL 2 TIMES DAILY
COMMUNITY
Start: 2022-11-09

## 2023-11-28 RX ORDER — KETOCONAZOLE 20 MG/ML
SHAMPOO TOPICAL
COMMUNITY
Start: 2017-08-06

## 2023-11-28 ASSESSMENT — PATIENT HEALTH QUESTIONNAIRE - PHQ9
SUM OF ALL RESPONSES TO PHQ QUESTIONS 1-9: 0
SUM OF ALL RESPONSES TO PHQ9 QUESTIONS 1 & 2: 0
SUM OF ALL RESPONSES TO PHQ QUESTIONS 1-9: 0
1. LITTLE INTEREST OR PLEASURE IN DOING THINGS: 0
SUM OF ALL RESPONSES TO PHQ QUESTIONS 1-9: 0
2. FEELING DOWN, DEPRESSED OR HOPELESS: 0
SUM OF ALL RESPONSES TO PHQ QUESTIONS 1-9: 0

## 2023-11-28 NOTE — PROGRESS NOTES
[address] [place] [address] [address]  [address] [facility] [place], [facility] Joce Johnson     Subjective:        Edison Cooper is a 81 y.o. male is here for routine f/u. The patient denies chest pain/ shortness of breath, orthopnea, PND, LE edema, palpitations, syncope, presyncope or fatigue. Patient Active Problem List    Diagnosis Date Noted    Cellulitis 08/23/2022    Coronary artery disease involving native coronary artery of native heart without angina pectoris 03/09/2022    Anemia 10/13/2017    Hyponatremia 10/13/2017    PAT (paroxysmal atrial tachycardia) 10/12/2017    Elevated troponin 10/11/2017    Pulmonary edema 10/11/2017    COPD (chronic obstructive pulmonary disease) ([address]) 10/11/2017    Acute respiratory failure with hypoxia ([address]) 10/11/2017    NICM (nonischemic cardiomyopathy) ([address]) 10/11/2017    Accelerated hypertension 10/11/2017    Hip arthritis 09/21/2017    Degenerative joint disease of right hip 02/29/2016      Richar Joyner MD  Past Medical History:   Diagnosis Date    Anemia 10/13/2017    Arthritis     CAD (coronary artery disease)     Dr. Catalina Rivera    Chronic obstructive pulmonary disease ([address])     monitored by PCP    Chronic pain     back    Congestive heart failure (HCC)     GERD (gastroesophageal reflux disease)     Hypertension     Hyponatremia 10/13/2017    Ill-defined condition     Gout    Psychiatric disorder     ETOH abuse    Psychiatric disorder     Generalized Anxiety Disorder      Past Surgical History:   Procedure Laterality Date    CT BONE MARROW BIOPSY  2/20/2018    CT BONE MARROW BIOPSY 2/20/2018 [facility] RAD CT    HERNIA REPAIR  10/27/2022    Robot-assisted laparoscopic repair of right inguinal hernia with mesh.     KNEE SCOPE,MED OR LAT MENIS REPAIR Left     fall - young age    OTHER SURGICAL HISTORY      investigational stem cell therapy with [facility]    OTHER SURGICAL HISTORY Left     mastoidectomy and inner ear surgery- age  18

## 2023-11-28 NOTE — PATIENT INSTRUCTIONS
Increase your Pravastatin to 20 mg after dinner. Get your blood work done in 3 months after starting the new dosage of pravastatin.

## 2024-04-19 ENCOUNTER — TRANSCRIBE ORDERS (OUTPATIENT)
Facility: HOSPITAL | Age: 74
End: 2024-04-19

## 2024-04-19 DIAGNOSIS — R91.1 PULMONARY NODULE: Primary | ICD-10-CM

## 2024-04-26 ENCOUNTER — HOSPITAL ENCOUNTER (OUTPATIENT)
Facility: HOSPITAL | Age: 74
End: 2024-04-26
Payer: MEDICARE

## 2024-04-26 DIAGNOSIS — R91.1 PULMONARY NODULE: ICD-10-CM

## 2024-04-26 PROCEDURE — 71250 CT THORAX DX C-: CPT

## 2024-05-28 RX ORDER — PRAVASTATIN SODIUM 20 MG
20 TABLET ORAL NIGHTLY
Qty: 90 TABLET | Refills: 1 | OUTPATIENT
Start: 2024-05-28

## 2024-07-29 ENCOUNTER — HOSPITAL ENCOUNTER (OUTPATIENT)
Facility: HOSPITAL | Age: 74
Discharge: HOME | End: 2024-08-01
Payer: MEDICARE

## 2024-07-29 DIAGNOSIS — M47.816 SPONDYLOSIS OF LUMBAR REGION WITHOUT MYELOPATHY OR RADICULOPATHY: ICD-10-CM

## 2024-07-29 DIAGNOSIS — M51.36 DDD (DEGENERATIVE DISC DISEASE), LUMBAR: ICD-10-CM

## 2024-07-29 DIAGNOSIS — M54.16 LUMBAR RADICULOPATHY: ICD-10-CM

## 2024-07-29 PROCEDURE — 72148 MRI LUMBAR SPINE W/O DYE: CPT

## 2024-10-31 ENCOUNTER — HOSPITAL ENCOUNTER (OUTPATIENT)
Facility: HOSPITAL | Age: 74
Discharge: HOME | End: 2024-11-03
Payer: MEDICARE

## 2024-10-31 DIAGNOSIS — R91.1 PULMONARY NODULE: ICD-10-CM

## 2024-10-31 PROCEDURE — 71250 CT THORAX DX C-: CPT

## 2025-04-17 ENCOUNTER — TRANSCRIBE ORDERS (OUTPATIENT)
Facility: HOSPITAL | Age: 75
End: 2025-04-17

## 2025-04-17 DIAGNOSIS — R91.1 PULMONARY NODULE: Primary | ICD-10-CM

## 2025-04-25 ENCOUNTER — HOSPITAL ENCOUNTER (OUTPATIENT)
Facility: HOSPITAL | Age: 75
Discharge: HOME | End: 2025-04-28
Payer: MEDICARE

## 2025-04-25 DIAGNOSIS — R91.1 PULMONARY NODULE: ICD-10-CM

## 2025-04-25 PROCEDURE — 71250 CT THORAX DX C-: CPT

## 2025-08-22 ENCOUNTER — TRANSCRIBE ORDERS (OUTPATIENT)
Facility: HOSPITAL | Age: 75
End: 2025-08-22

## 2025-08-22 DIAGNOSIS — C34.90 SQUAMOUS CELL CARCINOMA OF BRONCHUS, UNSPECIFIED LATERALITY (HCC): Primary | ICD-10-CM

## 2025-08-26 ENCOUNTER — TRANSCRIBE ORDERS (OUTPATIENT)
Facility: HOSPITAL | Age: 75
End: 2025-08-26

## 2025-08-26 DIAGNOSIS — R91.1 PULMONARY NODULE, LEFT: Primary | ICD-10-CM

## 2025-08-26 DIAGNOSIS — C34.12 MALIGNANT NEOPLASM OF UPPER LOBE OF LEFT LUNG (HCC): ICD-10-CM

## 2025-08-28 ENCOUNTER — HOSPITAL ENCOUNTER (OUTPATIENT)
Facility: HOSPITAL | Age: 75
Discharge: HOME | End: 2025-08-31
Attending: INTERNAL MEDICINE
Payer: MEDICARE

## 2025-08-28 VITALS — HEIGHT: 67 IN | WEIGHT: 213 LBS | BODY MASS INDEX: 33.43 KG/M2

## 2025-08-28 DIAGNOSIS — C34.12 MALIGNANT NEOPLASM OF UPPER LOBE OF LEFT LUNG (HCC): ICD-10-CM

## 2025-08-28 DIAGNOSIS — R91.1 PULMONARY NODULE, LEFT: ICD-10-CM

## 2025-08-28 LAB
GLUCOSE BLD STRIP.AUTO-MCNC: 95 MG/DL (ref 65–117)
SERVICE CMNT-IMP: NORMAL

## 2025-08-28 PROCEDURE — 82962 GLUCOSE BLOOD TEST: CPT

## 2025-08-28 PROCEDURE — 3430000000 HC RX DIAGNOSTIC RADIOPHARMACEUTICAL: Performed by: INTERNAL MEDICINE

## 2025-08-28 PROCEDURE — 78815 PET IMAGE W/CT SKULL-THIGH: CPT

## 2025-08-28 PROCEDURE — A9609 HC RX DIAGNOSTIC RADIOPHARMACEUTICAL: HCPCS | Performed by: INTERNAL MEDICINE

## 2025-08-28 RX ORDER — FLUDEOXYGLUCOSE F-18 500 MCI/ML
10 INJECTION INTRAVENOUS
Status: COMPLETED | OUTPATIENT
Start: 2025-08-28 | End: 2025-08-28

## 2025-08-28 RX ADMIN — FLUDEOXYGLUCOSE F-18 10 MILLICURIE: 500 INJECTION INTRAVENOUS at 12:10

## (undated) DEVICE — STERILE POLYISOPRENE POWDER-FREE SURGICAL GLOVES WITH EMOLLIENT COATING: Brand: PROTEXIS

## (undated) DEVICE — 3M™ IOBAN™ 2 ANTIMICROBIAL INCISE DRAPE 6650EZ: Brand: IOBAN™ 2

## (undated) DEVICE — SUT MCRYL 4-0 27IN PS2 UD --

## (undated) DEVICE — INSTRUMENT BATTERY

## (undated) DEVICE — SYR 50ML LR LCK 1ML GRAD NSAF --

## (undated) DEVICE — 3M™ IOBAN™ 2 ANTIMICROBIAL INCISE DRAPE 6651EZ: Brand: IOBAN™ 2

## (undated) DEVICE — 4-PORT MANIFOLD: Brand: NEPTUNE 2

## (undated) DEVICE — VISUALIZATION SYSTEM: Brand: CLEARIFY

## (undated) DEVICE — PRECISION FALCON OSCILLATING TIP SAW CARTRIDGE: Brand: PRECISION FALCON

## (undated) DEVICE — COLUMN DRAPE

## (undated) DEVICE — DRAPE,REIN 53X77,STERILE: Brand: MEDLINE

## (undated) DEVICE — KIT,1200CC CANISTER,3/16"X6' TUBING: Brand: MEDLINE INDUSTRIES, INC.

## (undated) DEVICE — SUTURE SZ 0 27IN 5/8 CIR UR-6  TAPER PT VIOLET ABSRB VICRYL J603H

## (undated) DEVICE — SUTURE ABSORBABLE MONOFILAMENT 2-0 WND CLOSURE GRN V-LOC 180 VLOCL0315

## (undated) DEVICE — TOWEL SURG W17XL27IN STD BLU COT NONFENESTRATED PREWASHED

## (undated) DEVICE — Device

## (undated) DEVICE — STERILE POLYISOPRENE POWDER-FREE SURGICAL GLOVES: Brand: PROTEXIS

## (undated) DEVICE — HANDLE LT SNAP ON ULT DURABLE LENS FOR TRUMPF ALC DISPOSABLE

## (undated) DEVICE — Z CONVERTED USE 2107985 COVER FLROSCP W36XL28IN 4 SIDE ADH

## (undated) DEVICE — PIN EXT FIX SCHNZ 3 MM

## (undated) DEVICE — SEAL UNIV 5-8MM DISP BX/10 -- DA VINCI XI - SNGL USE

## (undated) DEVICE — GLOVE SURG SZ 8 CRM LTX FREE POLYISOPRENE POLYMER BEAD ANTI

## (undated) DEVICE — SOLUTION IRRIG 1000ML STRL H2O USP PLAS POUR BTL

## (undated) DEVICE — WATERPROOF, BACTERIA PROOF DRESSING WITH ABSORBENT SEE THROUGH PAD: Brand: OPSITE POST-OP VISIBLE 20X10CM CTN 20

## (undated) DEVICE — INFECTION CONTROL KIT SYS

## (undated) DEVICE — HYPODERMIC SAFETY NEEDLE: Brand: MAGELLAN

## (undated) DEVICE — SOLUTION IRRIG 1000ML H2O STRL BLT

## (undated) DEVICE — ARM DRAPE

## (undated) DEVICE — GENERAL LAPAROSCOPY-MRMC: Brand: MEDLINE INDUSTRIES, INC.

## (undated) DEVICE — DEVON™ KNEE AND BODY STRAP 60" X 3" (1.5 M X 7.6 CM): Brand: DEVON

## (undated) DEVICE — OBTRTR BLDELSS OPT 8MM DISP -- DA VINICI XI - SNGL USE

## (undated) DEVICE — SLIM BODY SKIN STAPLER: Brand: APPOSE ULC

## (undated) DEVICE — FLOSEAL MATRIX IS INDICATED IN SURGICAL PROCEDURES (OTHER THAN IN OPHTHALMIC) AS AN ADJUNCT TO HEMOSTASIS WHEN CONTROL OF BLEEDING BY LIGATURE OR CONVENTIONALPROCEDURES IS INEFFECTIVE OR IMPRACTICAL.: Brand: FLOSEAL HEMOSTATIC MATRIX

## (undated) DEVICE — Z DISCONTINUED USE 2717541 SUTURE STRATAFIX SZ 3-0 L30CM NONABSORBABLE UD L26MM FS 3/8

## (undated) DEVICE — REM POLYHESIVE ADULT PATIENT RETURN ELECTRODE: Brand: VALLEYLAB

## (undated) DEVICE — SUTURE STRATAFIX SYMMETRIC SZ 1 L18IN ABSRB VLT CT1 L36CM SXPP1A404

## (undated) DEVICE — BLADE ASSEMB CLP HAIR FINE --

## (undated) DEVICE — NDL SPNE QNCKE 18GX3.5IN LF --

## (undated) DEVICE — KENDALL SCD EXPRESS SLEEVES, KNEE LENGTH, MEDIUM: Brand: KENDALL SCD

## (undated) DEVICE — SOLUTION IV 1000ML 0.9% SOD CHL

## (undated) DEVICE — COVER,MAYO STAND,STERILE: Brand: MEDLINE

## (undated) DEVICE — ELECTRODE BLDE L4IN NONINSULATED EDGE

## (undated) DEVICE — SUTURE V-LOC 180 SZ 0 L9IN ABSRB GRN GS-21 L37MM 1/2 CIR VLOCL0346

## (undated) DEVICE — SYSTEM SKIN CLSR 22CM DERMBND PRINEO

## (undated) DEVICE — COVER MPLR TIP CRV SCIS ACC DA VINCI

## (undated) DEVICE — GLOVE SURG SZ 85 CRM LTX FREE POLYISOPRENE POLYMER BEAD ANTI

## (undated) DEVICE — (D)PREP SKN CHLRAPRP APPL 26ML -- CONVERT TO ITEM 371833

## (undated) DEVICE — 40580 - THE PINK PAD - ADVANCED TRENDELENBURG POSITIONING KIT: Brand: 40580 - THE PINK PAD - ADVANCED TRENDELENBURG POSITIONING KIT